# Patient Record
Sex: FEMALE | Race: WHITE | Employment: OTHER | URBAN - METROPOLITAN AREA
[De-identification: names, ages, dates, MRNs, and addresses within clinical notes are randomized per-mention and may not be internally consistent; named-entity substitution may affect disease eponyms.]

---

## 2017-03-16 PROBLEM — M67.911 DISORDER OF RIGHT ROTATOR CUFF: Status: ACTIVE | Noted: 2017-03-16

## 2017-03-16 PROBLEM — R73.9 HYPERGLYCEMIA: Status: ACTIVE | Noted: 2017-03-16

## 2017-09-05 PROBLEM — R19.7 DIARRHEA: Status: ACTIVE | Noted: 2017-09-05

## 2017-09-05 PROBLEM — R00.1 BRADYCARDIA: Status: ACTIVE | Noted: 2017-09-05

## 2017-09-08 LAB
LEFT VENTRICULAR EJECTION FRACTION MODE: NORMAL
LV EF: 64 %

## 2017-10-10 ENCOUNTER — APPOINTMENT (OUTPATIENT)
Dept: GENERAL RADIOLOGY | Age: 82
End: 2017-10-10
Attending: INTERNAL MEDICINE
Payer: MEDICARE

## 2017-10-10 ENCOUNTER — HOSPITAL ENCOUNTER (OUTPATIENT)
Dept: CARDIAC CATH/INVASIVE PROCEDURES | Age: 82
Setting detail: OBSERVATION
Discharge: HOME | End: 2017-10-11
Attending: INTERNAL MEDICINE | Admitting: INTERNAL MEDICINE
Payer: MEDICARE

## 2017-10-10 PROBLEM — Z95.0 PACEMAKER: Status: ACTIVE | Noted: 2017-10-10

## 2017-10-10 LAB
ANION GAP SERPL CALC-SCNC: 10 MMOL/L (ref 7–16)
ANION GAP SERPL CALC-SCNC: 10 MMOL/L (ref 7–16)
ATRIAL RATE: 48 BPM
ATRIAL RATE: 60 BPM
BUN SERPL-MCNC: 25 MG/DL (ref 8–23)
BUN SERPL-MCNC: 25 MG/DL (ref 8–23)
CALCIUM SERPL-MCNC: 8.9 MG/DL (ref 8.3–10.4)
CALCIUM SERPL-MCNC: 9.7 MG/DL (ref 8.3–10.4)
CALCULATED P AXIS, ECG09: -9 DEGREES
CALCULATED P AXIS, ECG09: 49 DEGREES
CALCULATED R AXIS, ECG10: -34 DEGREES
CALCULATED R AXIS, ECG10: -50 DEGREES
CALCULATED T AXIS, ECG11: 27 DEGREES
CALCULATED T AXIS, ECG11: 57 DEGREES
CHLORIDE SERPL-SCNC: 106 MMOL/L (ref 98–107)
CHLORIDE SERPL-SCNC: 107 MMOL/L (ref 98–107)
CO2 SERPL-SCNC: 23 MMOL/L (ref 21–32)
CO2 SERPL-SCNC: 23 MMOL/L (ref 21–32)
CREAT SERPL-MCNC: 0.85 MG/DL (ref 0.6–1)
CREAT SERPL-MCNC: 0.99 MG/DL (ref 0.6–1)
DIAGNOSIS, 93000: NORMAL
DIAGNOSIS, 93000: NORMAL
ERYTHROCYTE [DISTWIDTH] IN BLOOD BY AUTOMATED COUNT: 13.4 % (ref 11.9–14.6)
ERYTHROCYTE [DISTWIDTH] IN BLOOD BY AUTOMATED COUNT: 13.4 % (ref 11.9–14.6)
GLUCOSE SERPL-MCNC: 97 MG/DL (ref 65–100)
GLUCOSE SERPL-MCNC: 98 MG/DL (ref 65–100)
HCT VFR BLD AUTO: 40.9 % (ref 35.8–46.3)
HCT VFR BLD AUTO: 44 % (ref 35.8–46.3)
HGB BLD-MCNC: 13.5 G/DL (ref 11.7–15.4)
HGB BLD-MCNC: 14.3 G/DL (ref 11.7–15.4)
INR PPP: 1 (ref 0.9–1.2)
MAGNESIUM SERPL-MCNC: 1.8 MG/DL (ref 1.8–2.4)
MCH RBC QN AUTO: 30.1 PG (ref 26.1–32.9)
MCH RBC QN AUTO: 30.5 PG (ref 26.1–32.9)
MCHC RBC AUTO-ENTMCNC: 32.5 G/DL (ref 31.4–35)
MCHC RBC AUTO-ENTMCNC: 33 G/DL (ref 31.4–35)
MCV RBC AUTO: 92.3 FL (ref 79.6–97.8)
MCV RBC AUTO: 92.6 FL (ref 79.6–97.8)
P-R INTERVAL, ECG05: 168 MS
P-R INTERVAL, ECG05: 186 MS
PLATELET # BLD AUTO: 165 K/UL (ref 150–450)
PLATELET # BLD AUTO: 65 K/UL (ref 150–450)
PMV BLD AUTO: 11.1 FL (ref 10.8–14.1)
PMV BLD AUTO: 12 FL (ref 10.8–14.1)
POTASSIUM SERPL-SCNC: 4 MMOL/L (ref 3.5–5.1)
POTASSIUM SERPL-SCNC: 4.9 MMOL/L (ref 3.5–5.1)
PROTHROMBIN TIME: 11.4 SEC (ref 9.6–12)
Q-T INTERVAL, ECG07: 428 MS
Q-T INTERVAL, ECG07: 442 MS
QRS DURATION, ECG06: 80 MS
QRS DURATION, ECG06: 84 MS
QTC CALCULATION (BEZET), ECG08: 394 MS
QTC CALCULATION (BEZET), ECG08: 428 MS
RBC # BLD AUTO: 4.43 M/UL (ref 4.05–5.25)
RBC # BLD AUTO: 4.75 M/UL (ref 4.05–5.25)
SODIUM SERPL-SCNC: 139 MMOL/L (ref 136–145)
SODIUM SERPL-SCNC: 140 MMOL/L (ref 136–145)
VENTRICULAR RATE, ECG03: 48 BPM
VENTRICULAR RATE, ECG03: 60 BPM
WBC # BLD AUTO: 8 K/UL (ref 4.3–11.1)
WBC # BLD AUTO: 9.9 K/UL (ref 4.3–11.1)

## 2017-10-10 PROCEDURE — 74011250636 HC RX REV CODE- 250/636

## 2017-10-10 PROCEDURE — C1892 INTRO/SHEATH,FIXED,PEEL-AWAY: HCPCS

## 2017-10-10 PROCEDURE — 99152 MOD SED SAME PHYS/QHP 5/>YRS: CPT

## 2017-10-10 PROCEDURE — 33208 INSRT HEART PM ATRIAL & VENT: CPT

## 2017-10-10 PROCEDURE — C1785 PMKR, DUAL, RATE-RESP: HCPCS

## 2017-10-10 PROCEDURE — 80048 BASIC METABOLIC PNL TOTAL CA: CPT | Performed by: INTERNAL MEDICINE

## 2017-10-10 PROCEDURE — 77030010507 HC ADH SKN DERMBND J&J -B

## 2017-10-10 PROCEDURE — 74011250636 HC RX REV CODE- 250/636: Performed by: INTERNAL MEDICINE

## 2017-10-10 PROCEDURE — 74011000250 HC RX REV CODE- 250: Performed by: INTERNAL MEDICINE

## 2017-10-10 PROCEDURE — 93005 ELECTROCARDIOGRAM TRACING: CPT | Performed by: INTERNAL MEDICINE

## 2017-10-10 PROCEDURE — 99153 MOD SED SAME PHYS/QHP EA: CPT

## 2017-10-10 PROCEDURE — 71010 XR CHEST PORT: CPT

## 2017-10-10 PROCEDURE — 74011250637 HC RX REV CODE- 250/637: Performed by: INTERNAL MEDICINE

## 2017-10-10 PROCEDURE — 99218 HC RM OBSERVATION: CPT

## 2017-10-10 PROCEDURE — 77030012935 HC DRSG AQUACEL BMS -B

## 2017-10-10 PROCEDURE — 85610 PROTHROMBIN TIME: CPT | Performed by: INTERNAL MEDICINE

## 2017-10-10 PROCEDURE — 36005 INJECTION EXT VENOGRAPHY: CPT

## 2017-10-10 PROCEDURE — 77030031139 HC SUT VCRL2 J&J -A

## 2017-10-10 PROCEDURE — 36415 COLL VENOUS BLD VENIPUNCTURE: CPT | Performed by: INTERNAL MEDICINE

## 2017-10-10 PROCEDURE — 74011636320 HC RX REV CODE- 636/320: Performed by: INTERNAL MEDICINE

## 2017-10-10 PROCEDURE — 85027 COMPLETE CBC AUTOMATED: CPT | Performed by: INTERNAL MEDICINE

## 2017-10-10 PROCEDURE — 83735 ASSAY OF MAGNESIUM: CPT | Performed by: INTERNAL MEDICINE

## 2017-10-10 PROCEDURE — C1898 LEAD, PMKR, OTHER THAN TRANS: HCPCS

## 2017-10-10 PROCEDURE — 77030018579 HC SUT TICRN1 COVD -B

## 2017-10-10 RX ORDER — CEFAZOLIN SODIUM IN 0.9 % NACL 2 G/50 ML
2 INTRAVENOUS SOLUTION, PIGGYBACK (ML) INTRAVENOUS EVERY 8 HOURS
Status: COMPLETED | OUTPATIENT
Start: 2017-10-10 | End: 2017-10-11

## 2017-10-10 RX ORDER — SODIUM CHLORIDE 0.9 % (FLUSH) 0.9 %
5-10 SYRINGE (ML) INJECTION EVERY 8 HOURS
Status: DISCONTINUED | OUTPATIENT
Start: 2017-10-10 | End: 2017-10-11 | Stop reason: HOSPADM

## 2017-10-10 RX ORDER — LOSARTAN POTASSIUM 50 MG/1
100 TABLET ORAL DAILY
Status: DISCONTINUED | OUTPATIENT
Start: 2017-10-11 | End: 2017-10-10

## 2017-10-10 RX ORDER — SODIUM CHLORIDE 0.9 % (FLUSH) 0.9 %
5-10 SYRINGE (ML) INJECTION AS NEEDED
Status: DISCONTINUED | OUTPATIENT
Start: 2017-10-10 | End: 2017-10-11 | Stop reason: HOSPADM

## 2017-10-10 RX ORDER — MONTELUKAST SODIUM 4 MG/1
1 TABLET, CHEWABLE ORAL 2 TIMES DAILY
Status: DISCONTINUED | OUTPATIENT
Start: 2017-10-10 | End: 2017-10-11 | Stop reason: HOSPADM

## 2017-10-10 RX ORDER — ATORVASTATIN CALCIUM 10 MG/1
10 TABLET, FILM COATED ORAL
Status: DISCONTINUED | OUTPATIENT
Start: 2017-10-10 | End: 2017-10-11 | Stop reason: HOSPADM

## 2017-10-10 RX ORDER — MIDAZOLAM HYDROCHLORIDE 1 MG/ML
.5-2 INJECTION, SOLUTION INTRAMUSCULAR; INTRAVENOUS
Status: DISCONTINUED | OUTPATIENT
Start: 2017-10-10 | End: 2017-10-10

## 2017-10-10 RX ORDER — FENTANYL CITRATE 50 UG/ML
25-100 INJECTION, SOLUTION INTRAMUSCULAR; INTRAVENOUS
Status: DISCONTINUED | OUTPATIENT
Start: 2017-10-10 | End: 2017-10-10

## 2017-10-10 RX ORDER — TRAMADOL HYDROCHLORIDE 50 MG/1
50 TABLET ORAL
Status: DISCONTINUED | OUTPATIENT
Start: 2017-10-10 | End: 2017-10-11 | Stop reason: HOSPADM

## 2017-10-10 RX ORDER — CEFAZOLIN SODIUM IN 0.9 % NACL 2 G/50 ML
2 INTRAVENOUS SOLUTION, PIGGYBACK (ML) INTRAVENOUS ONCE
Status: COMPLETED | OUTPATIENT
Start: 2017-10-10 | End: 2017-10-10

## 2017-10-10 RX ORDER — SODIUM CHLORIDE 9 MG/ML
75 INJECTION, SOLUTION INTRAVENOUS CONTINUOUS
Status: DISCONTINUED | OUTPATIENT
Start: 2017-10-10 | End: 2017-10-11 | Stop reason: HOSPADM

## 2017-10-10 RX ORDER — LIDOCAINE HYDROCHLORIDE 10 MG/ML
100-400 INJECTION INFILTRATION; PERINEURAL
Status: DISCONTINUED | OUTPATIENT
Start: 2017-10-10 | End: 2017-10-10

## 2017-10-10 RX ORDER — LOSARTAN POTASSIUM 100 MG/1
100 TABLET ORAL DAILY
Status: DISCONTINUED | OUTPATIENT
Start: 2017-10-11 | End: 2017-10-11 | Stop reason: HOSPADM

## 2017-10-10 RX ADMIN — FENTANYL CITRATE 25 MCG: 50 INJECTION, SOLUTION INTRAMUSCULAR; INTRAVENOUS at 10:09

## 2017-10-10 RX ADMIN — FENTANYL CITRATE 25 MCG: 50 INJECTION, SOLUTION INTRAMUSCULAR; INTRAVENOUS at 10:37

## 2017-10-10 RX ADMIN — IOPAMIDOL 15 ML: 755 INJECTION, SOLUTION INTRAVENOUS at 10:01

## 2017-10-10 RX ADMIN — FENTANYL CITRATE 25 MCG: 50 INJECTION, SOLUTION INTRAMUSCULAR; INTRAVENOUS at 10:03

## 2017-10-10 RX ADMIN — MIDAZOLAM HYDROCHLORIDE 1 MG: 1 INJECTION, SOLUTION INTRAMUSCULAR; INTRAVENOUS at 10:36

## 2017-10-10 RX ADMIN — TRAMADOL HYDROCHLORIDE 50 MG: 50 TABLET, FILM COATED ORAL at 17:21

## 2017-10-10 RX ADMIN — MIDAZOLAM HYDROCHLORIDE 1 MG: 1 INJECTION, SOLUTION INTRAMUSCULAR; INTRAVENOUS at 10:03

## 2017-10-10 RX ADMIN — Medication 10 ML: at 17:22

## 2017-10-10 RX ADMIN — CEFAZOLIN 2 G: 1 INJECTION, POWDER, FOR SOLUTION INTRAMUSCULAR; INTRAVENOUS; PARENTERAL at 09:41

## 2017-10-10 RX ADMIN — SODIUM CHLORIDE 50000 UNITS: 900 INJECTION, SOLUTION INTRAVENOUS at 10:27

## 2017-10-10 RX ADMIN — ATORVASTATIN CALCIUM 10 MG: 10 TABLET, FILM COATED ORAL at 20:18

## 2017-10-10 RX ADMIN — LIDOCAINE HYDROCHLORIDE 8 ML: 10 INJECTION, SOLUTION INFILTRATION; PERINEURAL at 10:14

## 2017-10-10 RX ADMIN — SODIUM CHLORIDE 75 ML/HR: 900 INJECTION, SOLUTION INTRAVENOUS at 09:41

## 2017-10-10 RX ADMIN — TRAMADOL HYDROCHLORIDE 50 MG: 50 TABLET, FILM COATED ORAL at 23:31

## 2017-10-10 RX ADMIN — Medication 10 ML: at 20:17

## 2017-10-10 RX ADMIN — CEFAZOLIN 2 G: 1 INJECTION, POWDER, FOR SOLUTION INTRAMUSCULAR; INTRAVENOUS; PARENTERAL at 22:09

## 2017-10-10 RX ADMIN — LIDOCAINE HYDROCHLORIDE 20 ML: 10 INJECTION, SOLUTION INFILTRATION; PERINEURAL at 10:07

## 2017-10-10 RX ADMIN — MIDAZOLAM HYDROCHLORIDE 1 MG: 1 INJECTION, SOLUTION INTRAMUSCULAR; INTRAVENOUS at 10:09

## 2017-10-10 RX ADMIN — CEFAZOLIN 2 G: 1 INJECTION, POWDER, FOR SOLUTION INTRAMUSCULAR; INTRAVENOUS; PARENTERAL at 17:25

## 2017-10-10 NOTE — PROGRESS NOTES
TRANSFER - IN REPORT:    Verbal report received from Vern1 Shukri Wyndmerebob Kapadia on Dissolve  being received from [facility] for routine progression of care      Report consisted of patients Situation, Background, Assessment and   Recommendations(SBAR). Information from the following report(s) SBAR, Kardex, Procedure Summary, Intake/Output, MAR and Recent Results was reviewed with the receiving nurse. Opportunity for questions and clarification was provided. Assessment completed upon patients arrival to unit and care assumed. Dual skin assessment completed with RN. No redness or skin breakdown on sacrum/coccyx area noted.

## 2017-10-10 NOTE — PROGRESS NOTES
Medicare Outpatient Observation Notice provided to the patient. Oral explanation was provided and all questions answered. Signed document placed in the medical record under media tab. Copy to patient. Pt alert and oriented s/p pacermaker insertion by Dr. Shelli Alfaro. States she lives with  and 47 y/o son with brain tumor with seizures. Independent prior to admission. Drives self and does not use anything to ambulate with. Dr. Laura Valdez is PCP and able to get rx financially per pt. Confirms MCR/supp. No needs voiced for d/c at this time.

## 2017-10-10 NOTE — IP AVS SNAPSHOT
[address] 
[phone] Patient: Yajaira Vasquez MRN: [id number] You are allergic to the following Allergen Reactions Lortab (Hydrocodone-Acetaminophen) Nausea and Vomiting Rash Other (comments) Lisinopril Cough Antihypertensives Other Medication Nausea Only Narcotics Immunizations Administered for This Admission Name Date Influenza Vaccine (Quad) PF 10/11/2017 Recent Documentation Height Weight Breastfeeding? BMI OB Status Smoking Status 1.549 m 66.4 kg No 27.66 kg/m2 Hysterectomy Never Smoker Emergency Contacts Name Discharge Info Relation Home Work Mobile Emmanuel Stone DISCHARGE CAREGIVER [3] Spouse [3] [phone] [address] [place] [address] CAREGIVER [3] Son [22] [phone] About your hospitalization You were admitted on:  October 10, 2017 You last received care in the:  [facility] 2 CV STEPDOWN You were discharged on:  2017 Unit phone number:  [phone] Why you were hospitalized Your primary diagnosis was:  Not on File Your diagnoses also included:  Pacemaker Providers Seen During Your Hospitalizations Provider Role Specialty Primary office phone Tristen Cox MD Attending Provider Cardiology [phone] Your Primary Care Physician (PCP) Primary Care Physician Office Phone Office Fax Nayely Be [phone] Follow-up Information Follow up With Details Comments Contact Info Carter Cbua MD  Oct 23 at 8:00[facility] office with device check  [address] [facility] Cardiology [facility] [address] 
[phone] MD Christi [address] [facility] [address] 
[phone] Your Appointments  2017  8:00 AM EDT  
 OFFICE DEVICE CHECKS with G[facility] [address] [facility] CARDIOLOGY [place] OFFICE ([facility]) [address] [place]  
[address] Patti Jha [address]  
[phone] This upcoming device check will take place IN OUR OFFICE. Please arrive 15 minutes early to review any necessary paperwork requirements. If you have any further questions or need to change this appointment, we are happy to help and can be reached at [phone]. Current Discharge Medication List  
  
START taking these medications Dose & Instructions Dispensing Information Comments Morning Noon Evening Bedtime  
 traMADol 50 mg tablet Commonly known as:  ULTRAM  
   
Your last dose was: Your next dose is:    
   
   
 Dose:  50 mg Take 1 Tab by mouth every six (6) hours as needed. Max Daily Amount: 200 mg. Quantity:  10 Tab Refills:  0 CONTINUE these medications which have NOT CHANGED Dose & Instructions Dispensing Information Comments Morning Noon Evening Bedtime  
 alendronate 70 mg tablet Commonly known as:  FOSAMAX Your last dose was: Your next dose is:    
   
   
 Dose:  70 mg Take 1 Tab by mouth every seven (7) days. With 8 oz water, sitting upright , 30 min before other food or drink Quantity:  12 Tab Refills:  4  
     
   
   
   
  
 atorvastatin 10 mg tablet Commonly known as:  LIPITOR Your last dose was: Your next dose is:    
   
   
 Dose:  10 mg Take 1 Tab by mouth daily. Quantity:  30 Tab Refills:  12 CALCIUM 600 + D 600-125 mg-unit Tab Generic drug:  calcium-cholecalciferol (d3) Your last dose was: Your next dose is:    
   
   
 Dose:  1 Tab Take 1 Tab by mouth daily. Refills:  0  
     
   
   
   
  
 COLESTID 1 gram tablet Generic drug:  colestipol Your last dose was:     
   
Your next dose is:    
   
   
 Dose:  1 g  
 Take 1 g by mouth two (2) times a day. Refills:  0  
     
   
   
   
  
 losartan 100 mg tablet Commonly known as:  COZAAR Your last dose was: Your next dose is: TAKE ONE TABLET BY MOUTH EVERY DAY FOR HYPERTENSION Quantity:  90 Tab Refills:  4  
     
   
   
   
  
 multivitamin with iron tablet Your last dose was: Your next dose is:    
   
   
 Dose:  1 Tab Take 1 Tab by mouth daily. Refills:  0  
     
   
   
   
  
 VITAMIN D3 1,000 unit Cap Generic drug:  cholecalciferol Your last dose was: Your next dose is:    
   
   
 Dose:  1000 Units Take 1,000 Units by mouth daily. Refills:  0 STOP taking these medications TYLENOL 325 mg tablet Generic drug:  acetaminophen Where to Get Your Medications Information on where to get these meds will be given to you by the nurse or doctor. ! Ask your nurse or doctor about these medications  
  traMADol 50 mg tablet Discharge Instructions Eat a low saturated fat, low cholesterol and low salt diet. Do not to lift anything over 5-10 lbs with left arm. No pushing or pulling or lifting arm above shoulder. Watch pacemaker incision site for bleeding/oozing, if seen apply firm pressure with clean cloth and call office at [phone]. Watch for signs of infection which include increasing area of redness around site, fever/hot to touch or purulent drainage. Call office or return to ER for immediate evaluation of any shortness of breath, chest pain or palpitations. Pacemaker Placement: What to Expect at [facility] Your Recovery Pacemaker placement is surgery to put a pacemaker in your chest when you have bradycardia (a slow heart rate). A pacemaker is a small, battery-powered device that sends electrical signals to the heart to keep the heartbeat steady.  Thin wires, called leads, carry the signals from the pacemaker to the heart. A pacemaker can prevent or reduce dizziness, fainting, and shortness of breath caused by a slow or unsteady heartbeat. Your chest may be sore where the doctor made the cut (incision) and put in the pacemaker. You also may have a bruise and mild swelling. These symptoms usually get better in 1 to 2 weeks. You may feel a hard ridge along the incision. This usually gets softer in the months after surgery. You may be able to see or feel the outline of the pacemaker under your skin. You will probably be able to go back to work or your usual routine 1 to 2 weeks after surgery. Pacemaker batteries usually last 5 to 15 years. Your doctor will talk to you about how often you will need to have your pacemaker checked. You can safely use most household and office electronics such as kitchen appliances, electric power tools, and computers. You will need to stay away from things with strong magnetic and electrical fields such as an MRI machine (unless your pacemaker is safe for an MRI), welding equipment, and power generators. You can use a cell phone, but keep it at least 6 inches away from your pacemaker. Check with your doctor about what you need to stay away from, what you need to use with care, and what is okay to use. This care sheet gives you a general idea about how long it will take for you to recover. But each person recovers at a different pace. Follow the steps below to get better as quickly as possible. How can you care for yourself at home? Activity · Rest when you feel tired. · Be active. Walking is a good choice. · For 4 to 6 weeks: ¨ Avoid activities that strain your chest or upper arm muscles. This includes pushing a  or vacuum, mopping floors, swimming, or swinging a golf club or tennis racquet. ¨ Do not raise your arm (the one on the side of your body where the pacemaker is located) above your shoulder. ¨ Allow your body to heal. Don't move quickly or lift anything heavy until you are feeling better. · Many people are able to return to work within 1 to 2 weeks after surgery. · Ask your doctor when it is okay for you to have sex. Diet · You can eat your normal diet. If your stomach is upset, try bland, low-fat foods like plain rice, broiled chicken, toast, and yogurt. Medicines · Your doctor will tell you if and when you can restart your medicines. He or she will also give you instructions about taking any new medicines. · If you take aspirin or some other blood thinner, be sure to talk to your doctor. He or she will tell you if and when to start taking this medicine again. Make sure that you understand exactly what your doctor wants you to do. · Be safe with medicines. Read and follow all instructions on the label. ¨ If the doctor gave you a prescription medicine for pain, take it as prescribed. ¨ If you are not taking a prescription pain medicine, ask your doctor if you can take an over-the-counter medicine. ¨ Do not take aspirin, ibuprofen (Advil, Motrin), naproxen (Aleve), or other nonsteroidal anti-inflammatory drugs (NSAIDs) unless your doctor says it is okay. · If your doctor prescribed antibiotics, take them as directed. Do not stop taking them just because you feel better. You need to take the full course of antibiotics. Incision care · If you have strips of tape on the incision, leave the tape on for a week or until it falls off. · Keep the incision dry while it heals. Your doctor may recommend sponge baths for about 7 days, but do not get the incision wet. Your doctor will let you know when you may take showers. After a shower, pat the incision dry. · Don't use hydrogen peroxide or alcohol on the incision, which can slow healing. You may cover the area with a gauze bandage if it oozes fluid or rubs against clothing. Change the bandage every day. · Do not take a bath or get into a hot tub for the first 2 weeks, or until your doctor tells you it is okay. Other instructions · Keep a medical ID card with you at all times that says you have a pacemaker. The card should include the  and model information. · Wear medical alert jewelry stating that you have a pacemaker. You can buy this at most drugstores. · Check your pulse as directed by your doctor. · Have your pacemaker checked as often as your doctor recommends. In some cases, this may be done over the phone or the Internet. Your doctor will give you instructions about how to do this. Follow-up care is a key part of your treatment and safety. Be sure to make and go to all appointments, and call your doctor if you are having problems. It's also a good idea to know your test results and keep a list of the medicines you take. When should you call for help? Call 911 anytime you think you may need emergency care. For example, call if: 
· You passed out (lost consciousness). · You have severe trouble breathing. · You have sudden chest pain and shortness of breath, or you cough up blood. · You have symptoms of a heart attack. These may include: ¨ Chest pain or pressure, or a strange feeling in the chest. 
¨ Sweating. ¨ Shortness of breath. ¨ Nausea or vomiting. ¨ Pain, pressure, or a strange feeling in the back, neck, jaw, or upper belly or in one or both shoulders or arms. ¨ Lightheadedness or sudden weakness. ¨ A fast or irregular heartbeat. After you call 911, the  may tell you to chew 1 adult-strength or 2 to 4 low-dose aspirin. Wait for an ambulance. Do not try to drive yourself. · You have symptoms of a stroke. These may include: 
¨ Sudden numbness, tingling, weakness, or loss of movement in your face, arm, or leg, especially on only one side of your body. ¨ Sudden vision changes. ¨ Sudden trouble speaking. ¨ Sudden confusion or trouble understanding simple statements. ¨ Sudden problems with walking or balance. ¨ A sudden, severe headache that is different from past headaches. Call your doctor now or seek immediate medical care if: 
· Your heartbeat feels very fast or slow, skips beats, or flutters. · You are dizzy or lightheaded, or you feel like you may faint. · You have new or increased shortness of breath. · You have pain that does not get better after you take pain medicine. · You hear an alarm or feel a vibration from your pacemaker. · You have loose stitches, or your incision comes open. · Bright red blood has soaked through the bandage over your incision. · You have signs of infection, such as: 
¨ Increased pain, swelling, warmth, or redness. ¨ Red streaks leading from the incision. ¨ Pus draining from the incision. ¨ A fever. · You have signs of a blood clot, such as: 
¨ Pain in your arm on the same side that the pacemaker is placed, or in your calf, back of the knee, thigh, or groin. ¨ Redness and swelling in your arm on the same side that the pacemaker is placed, or in your leg or groin. Watch closely for changes in your health, and be sure to contact your doctor if: 
· You have any problems with your pacemaker. Where can you learn more? Go to http://vira-jeffry.info/. Enter G550 in the search box to learn more about \"Pacemaker Placement: What to Expect at Home. \" Current as of: November 11, 2016 Content Version: 11.3 © 8993-6760 BioMetric Solution. Care instructions adapted under license by [employer] (which disclaims liability or warranty for this information). If you have questions about a medical condition or this instruction, always ask your healthcare professional. [place] [address] any warranty or liability for your use of this information. Discharge Instructions Attachments/References HEALTHY DIET: HEART (ENGLISH) SECONDHAND SMOKE (ENGLISH) Discharge Orders None ACO Transitions of Care Introducing [employer] [address] Jillian Lin offers a voluntary care coordination program to provide high quality service and care to [facility] fee-for-service beneficiaries. Beckie Mccray was designed to help you enhance your health and well-being through the following services: ? Transitions of Care  support for individuals who are transitioning from one care setting to another (example: Hospital to home). ? Chronic and Complex Care Coordination  support for individuals and caregivers of those with serious or chronic illnesses or with more than one chronic (ongoing) condition and those who take a number of different medications. If you meet specific medical criteria, a [address] [place] [address] Rd may call you directly to coordinate your care with your primary care physician and your other care providers. For questions about the [facility] programs, please, contact your physicians office. For general questions or additional information about Accountable Care Organizations: 
Please visit www.medicare.gov/acos. html or call 1-800-MEDICARE (7-505.870.8324) TTY users should call 2-779.754.4755. WikiYou Announcement We are excited to announce that we are making your provider's discharge notes available to you in WikiYou. You will see these notes when they are completed and signed by the physician that discharged you from your recent hospital stay. If you have any questions or concerns about any information you see in WikiYou, please call the Health Information Department where you were seen or reach out to your Primary Care Provider for more information about your plan of care. Introducing [facility] [place] [facility]! Asael Steele introduces WikiYou patient portal. Now you can access parts of your medical record, email your doctor's office, and request medication refills online. 1. In your internet browser, go to [email or web address] 2. Click on the First Time User? Click Here link in the Sign In box. You will see the New Member Sign Up page. 3. Enter your Appetas Access Code exactly as it appears below. You will not need to use this code after youve completed the sign-up process. If you do not sign up before the expiration date, you must request a new code. · Appetas Access Code: [email or web address] Expires: 12/4/2017  9:06 AM 
 
4. Enter the last four digits of your Social Security Number (xxxx) and Date of Birth (mm/dd/yyyy) as indicated and click Submit. You will be taken to the next sign-up page. 5. Create a Appetas ID. This will be your Appetas login ID and cannot be changed, so think of one that is secure and easy to remember. 6. Create a Appetas password. You can change your password at any time. 7. Enter your Password Reset Question and Answer. This can be used at a later time if you forget your password. 8. Enter your e-mail address. You will receive e-mail notification when new information is available in [address] 9. Click Sign Up. You can now view and download portions of your medical record. 10. Click the Download Summary menu link to download a portable copy of your medical information. If you have questions, please visit the Frequently Asked Questions section of the Appetas website. Remember, Appetas is NOT to be used for urgent needs. For medical emergencies, dial 911. Now available from your iPhone and Android! General Information Please provide this summary of care documentation to your next provider. Patient Signature:  ____________________________________________________________ Date:  ____________________________________________________________  
  
Joelene Batman Provider Signature:  ____________________________________________________________ Date:  ____________________________________________________________ More Information Heart-Healthy Diet: Care Instructions Your Care Instructions A heart-healthy diet has lots of vegetables, fruits, nuts, beans, and whole grains, and is low in salt. It limits foods that are high in saturated fat, such as meats, cheeses, and fried foods. It may be hard to change your diet, but even small changes can lower your risk of heart attack and heart disease. Follow-up care is a key part of your treatment and safety. Be sure to make and go to all appointments, and call your doctor if you are having problems. It's also a good idea to know your test results and keep a list of the medicines you take. How can you care for yourself at home? Watch your portions · Learn what a serving is. A \"serving\" and a \"portion\" are not always the same thing. Make sure that you are not eating larger portions than are recommended. For example, a serving of pasta is ½ cup. A serving size of meat is 2 to 3 ounces. A 3-ounce serving is about the size of a deck of cards. Measure serving sizes until you are good at [place]" them. Keep in mind that restaurants often serve portions that are 2 or 3 times the size of one serving. · To keep your energy level up and keep you from feeling hungry, eat often but in smaller portions. · Eat only the number of calories you need to stay at a healthy weight. If you need to lose weight, eat fewer calories than your body burns (through exercise and other physical activity). Eat more fruits and vegetables · Eat a variety of fruit and vegetables every day. Dark green, deep orange, red, or yellow fruits and vegetables are especially good for you. Examples include spinach, carrots, peaches, and berries. · Keep carrots, celery, and other veggies handy for snacks. Buy fruit that is in season and store it where you can see it so that you will be tempted to eat it. · Cook dishes that have a lot of veggies in them, such as stir-fries and soups. Limit saturated and trans fat · Read food labels, and try to avoid saturated and trans fats. They increase your risk of heart disease. Trans fat is found in many processed foods such as cookies and crackers. · Use olive or canola oil when you cook. Try cholesterol-lowering spreads, such as Benecol or Take Control. · Bake, broil, grill, or steam foods instead of frying them. · Choose lean meats instead of high-fat meats such as hot dogs and sausages. Cut off all visible fat when you prepare meat. · Eat fish, skinless poultry, and meat alternatives such as soy products instead of high-fat meats. Soy products, such as tofu, may be especially good for your heart. · Choose low-fat or fat-free milk and dairy products. Eat fish · Eat at least two servings of fish a week. Certain fish, such as salmon and tuna, contain omega-3 fatty acids, which may help reduce your risk of heart attack. Eat foods high in fiber · Eat a variety of grain products every day. Include whole-grain foods that have lots of fiber and nutrients. Examples of whole-grain foods include oats, whole wheat bread, and brown rice. · Buy whole-grain breads and cereals, instead of white bread or pastries. Limit salt and sodium · Limit how much salt and sodium you eat to help lower your blood pressure. · Taste food before you salt it. Add only a little salt when you think you need it. With time, your taste buds will adjust to less salt. · Eat fewer snack items, fast foods, and other high-salt, processed foods. Check food labels for the amount of sodium in packaged foods. · Choose low-sodium versions of canned goods (such as soups, vegetables, and beans). Limit sugar · Limit drinks and foods with added sugar. These include candy, desserts, and soda pop. Limit alcohol · Limit alcohol to no more than 2 drinks a day for men and 1 drink a day for women. Too much alcohol can cause health problems. When should you call for help? Watch closely for changes in your health, and be sure to contact your doctor if: 
· You would like help planning heart-healthy meals. Where can you learn more? Go to http://vira-jeffry.info/. Enter V137 in the search box to learn more about \"Heart-Healthy Diet: Care Instructions. \" Current as of: April 3, 2017 Content Version: 11.3 © 5778-2905 Qwalytics. Care instructions adapted under license by 24tidy (which disclaims liability or warranty for this information). If you have questions about a medical condition or this instruction, always ask your healthcare professional. [address] any warranty or liability for your use of this information. Secondhand Smoke: Care Instructions Your Care Instructions Secondhand smoke comes from the burning end of a cigarette, cigar, or pipe and the smoke that a smoker exhales. The smoke contains nicotine and many other harmful chemicals. Breathing secondhand smoke can cause or worsen health problems including cancer, asthma, coronary artery disease, and respiratory infections. It can make your eyes and nose burn and cause a sore throat. Secondhand smoke is especially bad for babies and young children whose lungs are still developing. Babies whose parents smoke are more likely to have ear infections, pneumonia, and bronchitis in the first few years of their lives. Secondhand smoke can make asthma symptoms worse in children. If you are pregnant, it is important that you not smoke and that you avoid secondhand smoke. You are more likely to give birth to a baby who weighs less than expected (low birth weight) if you smoke. And your baby may have a greater risk for sudden infant death syndrome (SIDS).  Babies whose mothers are exposed to secondhand smoke during pregnancy have a higher risk for health problems. Follow-up care is a key part of your treatment and safety. Be sure to make and go to all appointments, and call your doctor if you are having problems. It's also a good idea to know your test results and keep a list of the medicines you take. How can you care for yourself at home? · Do not smoke or let anyone else smoke in your home. If people must smoke, ask them to go outside. · If people do smoke in your home, choose a room where you can open a window or use a fan to get the smoke outside. · Do not let anyone smoke in your car. If someone must smoke, pull over in a safe place and let him or her smoke away from the car. · Ask your employer to make sure that you have a smoke-free work area. · Make sure that your children are not exposed to secondhand smoke at day care, school, and after-school programs. · Try to choose nonsmoking bars, restaurants, and other public places when you go out. · Help your family and friends who smoke to quit by encouraging them to try. Tell them about treatment resources. Having support from others often helps. · If you smoke, quit. Quitting is hard, but there are ways to boost your chance of quitting tobacco for good. ¨ Use nicotine gum, patches, or lozenges. Call a quitline. Ask your doctor about stop-smoking programs and medicines. ¨ Keep trying. When should you call for help? Watch closely for changes in your health, and be sure to contact your doctor if you have any problems. Where can you learn more? Go to http://vira-jeffry.info/. Enter L004 in the search box to learn more about \"Secondhand Smoke: Care Instructions. \" Current as of: March 20, 2017 Content Version: 11.3 © 0693-2323 Zapoint. Care instructions adapted under license by [employer] Logs (which disclaims liability or warranty for this information).  If you have questions about a medical condition or this instruction, always ask your healthcare professional. [place] [address] any warranty or liability for your use of this information.

## 2017-10-10 NOTE — PROGRESS NOTES
TRANSFER - OUT REPORT:    Verbal report given to Nicholas Crowley RN (name) on [email or web address]  being transferred to [address] [place] [place] [address] ([facility]) for routine progression of care       Report consisted of patients Situation, Background, Assessment and   Recommendations(SBAR). Information from the following report(s) SBAR was reviewed with the receiving nurse. Lines:   Peripheral IV 12/04/09 Right Hand (Active)       Peripheral IV 10/10/17 Left Arm (Active)        Opportunity for questions and clarification was provided. Patient transported with:   Tech         Pt had dual chamber PPM placed to L chest. Settings are DDDR . Site closed with suture, Dermabond, and AquaCel Ag dressing. Sling applied to L arm. Pt received Versed 3mg IV and Fentanyl 75mcg IV.

## 2017-10-10 NOTE — PROGRESS NOTES
Report received from Gustavo Barnes. Procedural findings communicated. Intra procedural  medication administration reviewed. Progression of care discussed. Patient received into [address] [facility] 6 post procedure.      Incision site without bleeding or swelling yes    Dressing dry and intact yes    Patient instructed to limit movement to left upper  extremity    Routine post procedural vital signs and site assessment initiated yes

## 2017-10-10 NOTE — PROGRESS NOTES
Patient received to [address] [place] [address] room # 6  Ambulatory from [facility]. Patient scheduled for PPM today with Dr Indiana Stewart. Procedure reviewed & questions answered, voiced good understanding consent obtained & placed on chart. All medications and medical history reviewed. Will prep patient per orders. Patient & family updated on plan of care.

## 2017-10-10 NOTE — PROCEDURES
Pavel Bryant 44       Name:  Sadiq Fay   MR#:  [id number]   :  1934   Account #:  [de-identified]   Date of Adm:  10/10/2017       PROCEDURE: Permanent pacemaker implantation. REFERRING PHYSICIAN: Madelaine Knott MD.    PROCEDURE: After obtaining informed consent, the patient was   brought to the lab. Left pectoral region was cleaned and prepped   in sterile fashion. Lidocaine solution was used for local   anesthesia. Venogram was performed. Modified Seldinger technique   was used to cannulate the left subclavian vein x2. Combination   of sharp and blunt dissection was then utilized to make a   pacemaker pocket. Two sheaths were then placed over the wires. Leads were then placed in appropriate positions in the right   atrium and right ventricle. The right ventricle required a septal   location, the right atrium required 3 different locations before   the lead stayed stable. After finding stable lead positions, the   pocket was flushed, the leads were attached to the prepectoral   fascia. The can was attached to the appropriate leads and set   screw was tightened. The device was placed in the pocket and   closure was performed with 2-0 Vicryl, 3-0 Vicryl, Dermabond and   Aquacel. There were no complications. All counts were correct. IMPLANT DATA: The implanted pulse generator is a Biotronik Edora   8 DR-T, serial [id number], DDD CLS 60/120. Atrial lead is a Solia S45 bipolar active fixation 45 cm lead. Threshold 1.1 volts at 0.4 milliseconds. P waves 2.8 millivolts,   resistance 526 ohms. No phrenic pacing at 10 volts. Right ventricular lead is a Solia S53 bipolar active fixation 53   cm lead. Threshold 0.3 volts at 0.4 milliseconds, R-wave 17.2   millivolts, resistance 682 ohms. No diaphragmatic pacing at 10   volts. Retrograde conduction was 345 milliseconds.     CONCLUSION: Successful implant of a dual-chamber MRI safe   Biotronik pacing system in the left pectoral region for   intermittent heart block and pauses.         MD RIVERA Perry / Daphne Azevedo   D:  10/10/2017   11:00   T:  10/10/2017   11:22   Job #:  [id number]

## 2017-10-10 NOTE — PROGRESS NOTES
Called Dr. Kelly Rene due to patient requesting pain medicine. New orders received. 50mg ultram for pain q6 hrs. Patient stable.

## 2017-10-10 NOTE — PROGRESS NOTES
Bedside shift report given to Xavi Ruffin RN (oncoming nurse) by Vladimir Porter RN (offgoing nurse). Bedside shift report included the following information: SBAR, Kardex, ED Summary, MAR, and Recent Results.

## 2017-10-10 NOTE — PROGRESS NOTES
TRANSFER - OUT REPORT:    Verbal report given to [address] Karen Matias on [facility]  being transferred to [facility](unit) for routine progression of care       Report consisted of patients Situation, Background, Assessment and   Recommendations(SBAR). Information from the following report(s) Procedure Summary was reviewed with the receiving nurse. Lines:   Peripheral IV 12/04/09 Right Hand (Active)       Peripheral IV 10/10/17 Left Arm (Active)        Opportunity for questions and clarification was provided.

## 2017-10-11 VITALS
HEIGHT: 61 IN | BODY MASS INDEX: 27.64 KG/M2 | OXYGEN SATURATION: 96 % | SYSTOLIC BLOOD PRESSURE: 154 MMHG | DIASTOLIC BLOOD PRESSURE: 80 MMHG | TEMPERATURE: 97.7 F | HEART RATE: 60 BPM | WEIGHT: 146.4 LBS | RESPIRATION RATE: 18 BRPM

## 2017-10-11 PROCEDURE — 74011250637 HC RX REV CODE- 250/637: Performed by: INTERNAL MEDICINE

## 2017-10-11 PROCEDURE — 90471 IMMUNIZATION ADMIN: CPT

## 2017-10-11 PROCEDURE — 90686 IIV4 VACC NO PRSV 0.5 ML IM: CPT | Performed by: INTERNAL MEDICINE

## 2017-10-11 PROCEDURE — 74011250636 HC RX REV CODE- 250/636: Performed by: INTERNAL MEDICINE

## 2017-10-11 PROCEDURE — 99218 HC RM OBSERVATION: CPT

## 2017-10-11 RX ORDER — TRAMADOL HYDROCHLORIDE 50 MG/1
50 TABLET ORAL
Qty: 10 TAB | Refills: 0 | Status: SHIPPED | OUTPATIENT
Start: 2017-10-11 | End: 2018-02-13

## 2017-10-11 RX ADMIN — INFLUENZA VIRUS VACCINE 0.5 ML: 15; 15; 15; 15 SUSPENSION INTRAMUSCULAR at 10:38

## 2017-10-11 RX ADMIN — MONTELUKAST SODIUM 1 G: 4 TABLET, CHEWABLE ORAL at 08:15

## 2017-10-11 RX ADMIN — TRAMADOL HYDROCHLORIDE 50 MG: 50 TABLET, FILM COATED ORAL at 06:23

## 2017-10-11 RX ADMIN — Medication 10 ML: at 05:40

## 2017-10-11 RX ADMIN — LOSARTAN POTASSIUM 100 MG: 100 TABLET ORAL at 08:15

## 2017-10-11 NOTE — DISCHARGE SUMMARY
[facility] Cardiology Discharge Summary     Patient ID:  Yajaira Vasquez  [id number]  50 y.o.  1934    Admit date: 10/10/2017    Discharge date and time:  10-11-17    Admitting Physician: Tristen Cox MD     Discharge Physician: Rigo Dougherty NP/Dr. Diana    Admission Diagnoses: Bradycardia [R00.1]    Discharge Diagnoses:   Patient Active Problem List    Diagnosis Date Noted    Pacemaker 10/10/2017    Diarrhea 09/05/2017    Bradycardia 09/05/2017    Disorder of right rotator cuff 03/16/2017    Hyperglycemia  03/16/2017    Vitamin D deficiency 11/14/2016    Elevated carcinoembryonic antigen 10/04/2016    Chronic lymphocytic leukemia ([address] 03/04/2016    History of colorectal cancer 03/04/2016    Essential hypertension     HLD (hyperlipidemia)     Osteopenia     Sciatica     Iron deficiency anemia 10/19/2015    Glucose intolerance (no malabsorption) 10/19/2015    Osteoarthritis of right knee 10/07/2010    Total knee replacement status 10/07/2010       Cardiology Procedures this admission:  Pacemaker Implantation, CXR  Consults: none    Hospital Course: Patient was seen in our office by  [facility] for follow up post ecardio. He ecardio showed SB with sinus arrest of 3.1 seconds with symptoms. Pt was scheduled for an AM Admission PM implantation at [facility] on 10-10-17. Pt came in to [facility] and was taken to the cath lab by Dr. Parisa Miller. Pt received a dual-chamber MRI safe Biotronik pacemaker without complication. Follow up CXR showed no pneumothorax. Pt tolerated the procedure well and was taken to the CV stepdown floor for recovery. The following morning Pt was up feeling well without any complaints of CP, SOB or palpitations. The patient device was interrogated prior to discharge with normal function. Pt's left subclavian PM site was clean, dry and intact without hematoma. Pt's labs were WNL. Pt was seen and examined by Dr. Parisa Miller and determined stable and ready for discharge.  Pt was instructed to follow PM discharge instructions given by nursing staff. The patient will follow up with device clinic on 10/23/17 at 8 am in [facility]. DISPOSITION: The patient is being discharged home in stable condition on a low saturated fat, low cholesterol and low salt diet. Pt is instructed to advance activities as tolerated limited to fatigue or shortness of breath. Pt is instructed not to lift anything over 5-10 lbs with affected arm. No pushing or pulling or lifting arm above shoulder. See complete discharge instructions. Pt is instructed to watch PM site for bleeding/oozing, if seen Pt is instructed to apply firm pressure with clean cloth and call office at [phone]. Pt is instructed to watch for signs of infection which include increasing area of redness around site, fever/hot to touch or purulent drainage. Pt is instructed to call office or return to ER for immediate evaluation of any shortness of breath, chest pain or palpitations. Discharge Exam:   Visit Vitals    /84    Pulse 60    Temp 97.3 °F (36.3 °C)    Resp 18    Ht 5' 1\" (1.549 m)    Wt 66.4 kg (146 lb 6.4 oz)    SpO2 95%    Breastfeeding No    BMI 27.66 kg/m2       Visit Vitals    /84    Pulse 60    Temp 97.3 °F (36.3 °C)    Resp 18    Ht 5' 1\" (1.549 m)    Wt 66.4 kg (146 lb 6.4 oz)    SpO2 95%    Breastfeeding No    BMI 27.66 kg/m2     General Appearance:  Well developed, well nourished,alert and oriented x 3, and individual in no acute distress. Ears/Nose/Mouth/Throat:   Hearing grossly normal.         Neck: Supple. Chest:   Lungs clear to auscultation bilaterally. Left upper chest with dressing intact, no bleeding or hematoma. Cardiovascular:  Regular rate and rhythm, S1, S2 normal, no murmur. Abdomen:   Soft, non-tender, bowel sounds are active. Extremities: No edema bilaterally. Skin: Warm and dry.               Pt has been seen by Dr. Taiwo Tripathi: see his progress note for exam details. Recent Results (from the past 24 hour(s))   METABOLIC PANEL, BASIC    Collection Time: 10/10/17  8:24 AM   Result Value Ref Range    Sodium 139 136 - 145 mmol/L    Potassium 4.0 3.5 - 5.1 mmol/L    Chloride 106 98 - 107 mmol/L    CO2 23 21 - 32 mmol/L    Anion gap 10 7 - 16 mmol/L    Glucose 98 65 - 100 mg/dL    BUN 25 (H) 8 - 23 MG/DL    Creatinine 0.85 0.6 - 1.0 MG/DL    GFR est AA >60 >60 ml/min/1.73m2    GFR est non-AA >60 >60 ml/min/1.73m2    Calcium 8.9 8.3 - 10.4 MG/DL   CBC W/O DIFF    Collection Time: 10/10/17  8:24 AM   Result Value Ref Range    WBC 9.9 4.3 - 11.1 K/uL    RBC 4.43 4.05 - 5.25 M/uL    HGB 13.5 11.7 - 15.4 g/dL    HCT 40.9 35.8 - 46.3 %    MCV 92.3 79.6 - 97.8 FL    MCH 30.5 26.1 - 32.9 PG    MCHC 33.0 31.4 - 35.0 g/dL    RDW 13.4 11.9 - 14.6 %    PLATELET 386 630 - 804 K/uL    MPV 11.1 10.8 - 14.1 FL   EKG, 12 LEAD, INITIAL    Collection Time: 10/10/17 12:58 PM   Result Value Ref Range    Ventricular Rate 60 BPM    Atrial Rate 60 BPM    P-R Interval 168 ms    QRS Duration 84 ms    Q-T Interval 428 ms    QTC Calculation (Bezet) 428 ms    Calculated P Axis -9 degrees    Calculated R Axis -50 degrees    Calculated T Axis 27 degrees    Diagnosis       Electronic atrial pacemaker  Left axis deviation  Minimal voltage criteria for LVH, may be normal variant  Inferior infarct , age undetermined  Possible Anterior infarct , age undetermined  Abnormal ECG  When compared with ECG of 10-OCT-2017 07:28,  Electronic atrial pacemaker has replaced Sinus rhythm  Inferior infarct is now Present  Confirmed by [facility]  MD (), Mitzi Rios ([id number]) on 10/10/2017 2:46:22 PM           Patient Instructions:   Current Discharge Medication List      START taking these medications    Details   traMADol (ULTRAM) 50 mg tablet Take 1 Tab by mouth every six (6) hours as needed. Max Daily Amount: 200 mg.   Qty: 10 Tab, Refills: 0         CONTINUE these medications which have NOT CHANGED    Details   colestipol (COLESTID) 1 gram tablet Take 1 g by mouth two (2) times a day. losartan (COZAAR) 100 mg tablet TAKE ONE TABLET BY MOUTH EVERY DAY FOR HYPERTENSION  Qty: 90 Tab, Refills: 4    Associated Diagnoses: Essential hypertension with goal blood pressure less than 130/80      atorvastatin (LIPITOR) 10 mg tablet Take 1 Tab by mouth daily. Qty: 30 Tab, Refills: 12    Associated Diagnoses: Essential hypertension      calcium-cholecalciferol, d3, (CALCIUM 600 + D) 600-125 mg-unit tab Take 1 Tab by mouth daily. cholecalciferol (VITAMIN D3) 1,000 unit cap Take 1,000 Units by mouth daily. alendronate (FOSAMAX) 70 mg tablet Take 1 Tab by mouth every seven (7) days. With 8 oz water, sitting upright , 30 min before other food or drink  Qty: 12 Tab, Refills: 4    Associated Diagnoses: Osteopenia      multivitamin with iron tablet Take 1 Tab by mouth daily.          STOP taking these medications       acetaminophen (TYLENOL) 325 mg tablet Comments:   Reason for Stopping:                 Signed:  Liat Patel NP  10/11/2017  8:01 AM

## 2017-10-11 NOTE — DISCHARGE INSTRUCTIONS
Eat a low saturated fat, low cholesterol and low salt diet. Do not to lift anything over 5-10 lbs with left arm. No pushing or pulling or lifting arm above shoulder. Watch pacemaker incision site for bleeding/oozing, if seen apply firm pressure with clean cloth and call office at [phone]. Watch for signs of infection which include increasing area of redness around site, fever/hot to touch or purulent drainage. Call office or return to ER for immediate evaluation of any shortness of breath, chest pain or palpitations. Pacemaker Placement: What to Expect at [facility] placement is surgery to put a pacemaker in your chest when you have bradycardia (a slow heart rate). A pacemaker is a small, battery-powered device that sends electrical signals to the heart to keep the heartbeat steady. Thin wires, called leads, carry the signals from the pacemaker to the heart. A pacemaker can prevent or reduce dizziness, fainting, and shortness of breath caused by a slow or unsteady heartbeat. Your chest may be sore where the doctor made the cut (incision) and put in the pacemaker. You also may have a bruise and mild swelling. These symptoms usually get better in 1 to 2 weeks. You may feel a hard ridge along the incision. This usually gets softer in the months after surgery. You may be able to see or feel the outline of the pacemaker under your skin. You will probably be able to go back to work or your usual routine 1 to 2 weeks after surgery. Pacemaker batteries usually last 5 to 15 years. Your doctor will talk to you about how often you will need to have your pacemaker checked. You can safely use most household and office electronics such as kitchen appliances, electric power tools, and computers. You will need to stay away from things with strong magnetic and electrical fields such as an MRI machine (unless your pacemaker is safe for an MRI), welding equipment, and power generators.  You can use a cell phone, but keep it at least 6 inches away from your pacemaker. Check with your doctor about what you need to stay away from, what you need to use with care, and what is okay to use. This care sheet gives you a general idea about how long it will take for you to recover. But each person recovers at a different pace. Follow the steps below to get better as quickly as possible. How can you care for yourself at home? Activity  · Rest when you feel tired. · Be active. Walking is a good choice. · For 4 to 6 weeks:  ¨ Avoid activities that strain your chest or upper arm muscles. This includes pushing a  or vacuum, mopping floors, swimming, or swinging a golf club or tennis racquet. ¨ Do not raise your arm (the one on the side of your body where the pacemaker is located) above your shoulder. ¨ Allow your body to heal. Don't move quickly or lift anything heavy until you are feeling better. · Many people are able to return to work within 1 to 2 weeks after surgery. · Ask your doctor when it is okay for you to have sex. Diet  · You can eat your normal diet. If your stomach is upset, try bland, low-fat foods like plain rice, broiled chicken, toast, and yogurt. Medicines  · Your doctor will tell you if and when you can restart your medicines. He or she will also give you instructions about taking any new medicines. · If you take aspirin or some other blood thinner, be sure to talk to your doctor. He or she will tell you if and when to start taking this medicine again. Make sure that you understand exactly what your doctor wants you to do. · Be safe with medicines. Read and follow all instructions on the label. ¨ If the doctor gave you a prescription medicine for pain, take it as prescribed. ¨ If you are not taking a prescription pain medicine, ask your doctor if you can take an over-the-counter medicine.   ¨ Do not take aspirin, ibuprofen (Advil, Motrin), naproxen (Aleve), or other nonsteroidal anti-inflammatory drugs (NSAIDs) unless your doctor says it is okay. · If your doctor prescribed antibiotics, take them as directed. Do not stop taking them just because you feel better. You need to take the full course of antibiotics. Incision care  · If you have strips of tape on the incision, leave the tape on for a week or until it falls off. · Keep the incision dry while it heals. Your doctor may recommend sponge baths for about 7 days, but do not get the incision wet. Your doctor will let you know when you may take showers. After a shower, pat the incision dry. · Don't use hydrogen peroxide or alcohol on the incision, which can slow healing. You may cover the area with a gauze bandage if it oozes fluid or rubs against clothing. Change the bandage every day. · Do not take a bath or get into a hot tub for the first 2 weeks, or until your doctor tells you it is okay. Other instructions  · Keep a medical ID card with you at all times that says you have a pacemaker. The card should include the  and model information. · Wear medical alert jewelry stating that you have a pacemaker. You can buy this at most [employer]. · Check your pulse as directed by your doctor. · Have your pacemaker checked as often as your doctor recommends. In some cases, this may be done over the phone or the Internet. Your doctor will give you instructions about how to do this. Follow-up care is a key part of your treatment and safety. Be sure to make and go to all appointments, and call your doctor if you are having problems. It's also a good idea to know your test results and keep a list of the medicines you take. When should you call for help? Call 911 anytime you think you may need emergency care. For example, call if:  · You passed out (lost consciousness). · You have severe trouble breathing. · You have sudden chest pain and shortness of breath, or you cough up blood. · You have symptoms of a heart attack. These may include:  ¨ Chest pain or pressure, or a strange feeling in the chest.  ¨ Sweating. ¨ Shortness of breath. ¨ Nausea or vomiting. ¨ Pain, pressure, or a strange feeling in the back, neck, jaw, or upper belly or in one or both shoulders or arms. ¨ Lightheadedness or sudden weakness. ¨ A fast or irregular heartbeat. After you call 911, the  may tell you to chew 1 adult-strength or 2 to 4 low-dose aspirin. Wait for an ambulance. Do not try to drive yourself. · You have symptoms of a stroke. These may include:  ¨ Sudden numbness, tingling, weakness, or loss of movement in your face, arm, or leg, especially on only one side of your body. ¨ Sudden vision changes. ¨ Sudden trouble speaking. ¨ Sudden confusion or trouble understanding simple statements. ¨ Sudden problems with walking or balance. ¨ A sudden, severe headache that is different from past headaches. Call your doctor now or seek immediate medical care if:  · Your heartbeat feels very fast or slow, skips beats, or flutters. · You are dizzy or lightheaded, or you feel like you may faint. · You have new or increased shortness of breath. · You have pain that does not get better after you take pain medicine. · You hear an alarm or feel a vibration from your pacemaker. · You have loose stitches, or your incision comes open. · Bright red blood has soaked through the bandage over your incision. · You have signs of infection, such as:  ¨ Increased pain, swelling, warmth, or redness. ¨ Red streaks leading from the incision. ¨ Pus draining from the incision. ¨ A fever. · You have signs of a blood clot, such as:  ¨ Pain in your arm on the same side that the pacemaker is placed, or in your calf, back of the knee, thigh, or groin. ¨ Redness and swelling in your arm on the same side that the pacemaker is placed, or in your leg or groin.   Watch closely for changes in your health, and be sure to contact your doctor if:  · You have any problems with your pacemaker. Where can you learn more? Go to http://vira-jeffry.info/. Enter G550 in the search box to learn more about \"Pacemaker Placement: What to Expect at Home. \"  Current as of: November 11, 2016  Content Version: 11.3  © 2236-6444 [employer]. Care instructions adapted under license by [employer] (which disclaims liability or warranty for this information). If you have questions about a medical condition or this instruction, always ask your healthcare professional. [address] any warranty or liability for your use of this information.

## 2017-10-11 NOTE — PROGRESS NOTES
Pt seen by MD, discharge instructions, prescriptions, and follow up appt information given to and reviewed with patient and . Opportunity for questions provided, pt voiced understanding. All pt belongings taken with pt from room. Pt transported to discharge area via wheelchair,  driving pt home.

## 2017-10-11 NOTE — PROGRESS NOTES
Bedside shift report given to Emma Mena RN (oncoming nurse) by Mckenzie Ramirez RN (offgoing nurse). Bedside shift report included the following information: SBAR, Kardex, ED Summary, MAR, and Recent Results.

## 2018-02-27 ENCOUNTER — ANESTHESIA EVENT (OUTPATIENT)
Dept: ENDOSCOPY | Age: 83
End: 2018-02-27
Payer: MEDICARE

## 2018-02-27 RX ORDER — DIPHENHYDRAMINE HYDROCHLORIDE 50 MG/ML
12.5 INJECTION, SOLUTION INTRAMUSCULAR; INTRAVENOUS ONCE
Status: CANCELLED | OUTPATIENT
Start: 2018-02-27 | End: 2018-02-27

## 2018-02-27 RX ORDER — ONDANSETRON 2 MG/ML
4 INJECTION INTRAMUSCULAR; INTRAVENOUS ONCE
Status: CANCELLED | OUTPATIENT
Start: 2018-02-27 | End: 2018-02-27

## 2018-02-27 RX ORDER — SODIUM CHLORIDE 0.9 % (FLUSH) 0.9 %
5-10 SYRINGE (ML) INJECTION AS NEEDED
Status: CANCELLED | OUTPATIENT
Start: 2018-02-27

## 2018-02-27 RX ORDER — SODIUM CHLORIDE, SODIUM LACTATE, POTASSIUM CHLORIDE, CALCIUM CHLORIDE 600; 310; 30; 20 MG/100ML; MG/100ML; MG/100ML; MG/100ML
75 INJECTION, SOLUTION INTRAVENOUS CONTINUOUS
Status: CANCELLED | OUTPATIENT
Start: 2018-02-27

## 2018-02-27 RX ORDER — NALOXONE HYDROCHLORIDE 0.4 MG/ML
0.1 INJECTION, SOLUTION INTRAMUSCULAR; INTRAVENOUS; SUBCUTANEOUS AS NEEDED
Status: CANCELLED | OUTPATIENT
Start: 2018-02-27 | End: 2018-02-27

## 2018-02-28 ENCOUNTER — ANESTHESIA (OUTPATIENT)
Dept: ENDOSCOPY | Age: 83
End: 2018-02-28
Payer: MEDICARE

## 2018-02-28 ENCOUNTER — HOSPITAL ENCOUNTER (OUTPATIENT)
Age: 83
Setting detail: OUTPATIENT SURGERY
Discharge: HOME | End: 2018-02-28
Attending: INTERNAL MEDICINE | Admitting: INTERNAL MEDICINE
Payer: MEDICARE

## 2018-02-28 VITALS
HEART RATE: 68 BPM | WEIGHT: 145 LBS | DIASTOLIC BLOOD PRESSURE: 80 MMHG | TEMPERATURE: 97.9 F | RESPIRATION RATE: 18 BRPM | SYSTOLIC BLOOD PRESSURE: 133 MMHG | HEIGHT: 61 IN | OXYGEN SATURATION: 95 % | BODY MASS INDEX: 27.38 KG/M2

## 2018-02-28 PROCEDURE — 74011250636 HC RX REV CODE- 250/636: Performed by: ANESTHESIOLOGY

## 2018-02-28 PROCEDURE — 76040000025: Performed by: INTERNAL MEDICINE

## 2018-02-28 PROCEDURE — 74011250636 HC RX REV CODE- 250/636

## 2018-02-28 PROCEDURE — 76060000031 HC ANESTHESIA FIRST 0.5 HR: Performed by: INTERNAL MEDICINE

## 2018-02-28 PROCEDURE — 88305 TISSUE EXAM BY PATHOLOGIST: CPT | Performed by: INTERNAL MEDICINE

## 2018-02-28 PROCEDURE — 77030009426 HC FCPS BIOP ENDOSC BSC -B: Performed by: INTERNAL MEDICINE

## 2018-02-28 RX ORDER — SODIUM CHLORIDE 0.9 % (FLUSH) 0.9 %
5-10 SYRINGE (ML) INJECTION EVERY 8 HOURS
Status: DISCONTINUED | OUTPATIENT
Start: 2018-02-28 | End: 2018-02-28 | Stop reason: HOSPADM

## 2018-02-28 RX ORDER — LIDOCAINE HYDROCHLORIDE 10 MG/ML
0.1 INJECTION INFILTRATION; PERINEURAL AS NEEDED
Status: DISCONTINUED | OUTPATIENT
Start: 2018-02-28 | End: 2018-02-28 | Stop reason: HOSPADM

## 2018-02-28 RX ORDER — PROPOFOL 10 MG/ML
INJECTION, EMULSION INTRAVENOUS AS NEEDED
Status: DISCONTINUED | OUTPATIENT
Start: 2018-02-28 | End: 2018-02-28 | Stop reason: HOSPADM

## 2018-02-28 RX ORDER — SODIUM CHLORIDE, SODIUM LACTATE, POTASSIUM CHLORIDE, CALCIUM CHLORIDE 600; 310; 30; 20 MG/100ML; MG/100ML; MG/100ML; MG/100ML
1000 INJECTION, SOLUTION INTRAVENOUS CONTINUOUS
Status: DISCONTINUED | OUTPATIENT
Start: 2018-02-28 | End: 2018-02-28 | Stop reason: HOSPADM

## 2018-02-28 RX ORDER — FENTANYL CITRATE 50 UG/ML
100 INJECTION, SOLUTION INTRAMUSCULAR; INTRAVENOUS AS NEEDED
Status: DISCONTINUED | OUTPATIENT
Start: 2018-02-28 | End: 2018-02-28 | Stop reason: HOSPADM

## 2018-02-28 RX ORDER — SODIUM CHLORIDE 0.9 % (FLUSH) 0.9 %
5-10 SYRINGE (ML) INJECTION AS NEEDED
Status: DISCONTINUED | OUTPATIENT
Start: 2018-02-28 | End: 2018-02-28 | Stop reason: HOSPADM

## 2018-02-28 RX ORDER — MIDAZOLAM HYDROCHLORIDE 1 MG/ML
2 INJECTION, SOLUTION INTRAMUSCULAR; INTRAVENOUS
Status: DISCONTINUED | OUTPATIENT
Start: 2018-02-28 | End: 2018-02-28 | Stop reason: HOSPADM

## 2018-02-28 RX ORDER — PROPOFOL 10 MG/ML
INJECTION, EMULSION INTRAVENOUS
Status: DISCONTINUED | OUTPATIENT
Start: 2018-02-28 | End: 2018-02-28 | Stop reason: HOSPADM

## 2018-02-28 RX ADMIN — PROPOFOL 50 MG: 10 INJECTION, EMULSION INTRAVENOUS at 08:10

## 2018-02-28 RX ADMIN — PROPOFOL 100 MCG/KG/MIN: 10 INJECTION, EMULSION INTRAVENOUS at 08:07

## 2018-02-28 RX ADMIN — SODIUM CHLORIDE, SODIUM LACTATE, POTASSIUM CHLORIDE, AND CALCIUM CHLORIDE 1000 ML: 600; 310; 30; 20 INJECTION, SOLUTION INTRAVENOUS at 07:43

## 2018-02-28 NOTE — DISCHARGE INSTRUCTIONS
Gastrointestinal Colonoscopy/Flexible Sigmoidoscopy - Lower Exam Discharge Instructions  1. Call Dr. Demetrius Stallings at [phone] for any problems or questions. 2. Contact the doctors office for follow up appointment as directed  3. Medication may cause drowsiness for several hours, therefore, do not drive or operate machinery for remainder of the day. 4. No alcohol today. 5. Ordinarily, you may resume regular diet and activity after exam unless otherwise specified by your physician. 6. Because of air put into your colon during exam, you may experience some abdominal distension, relieved by the passage of gas, for several hours. 7. Contact your physician if you have any of the following:  a. Excessive amount of bleeding - large amount when having a bowel movement. Occasional specks of blood with bowel movement would not be unusual.  b. Severe abdominal pain  c. Fever or Chills    Any additional instructions: Follow up in office, call for appointment. No further screening colonoscopy required. Instructions given to Cosyf[place]ou and other family members.   Instructions given by:  Farshad Koch RN

## 2018-02-28 NOTE — PROCEDURES
DATE OF PROCEDURE: 2/28/18      PROCEDURE: Colonoscopy with biopsy    ENDOSCOPIST: Melissa Douglas M.D. PREOPERATIVE DIAGNOSIS: Chronic diarrhea    POSTOPERATIVE DIAGNOSIS: Diverticulosis    SEDATION: MAC    INSTRUMENT: [id number]    DESCRIPTION OF PROCEDURE:  After informed consent was obtained the patient was placed in the left lateral decubitus position. Intravenous sedation was administered as above. After adequate sedation had been achieved, digital rectal examination was performed. The colonoscope was then inserted through the anus and followed the course of the rectum and colon to the terminal ileum. The colonoscope was withdrawn from that point, performing a careful survey of the mucosa. Retroflexion was performed in the rectum. FINDINGS:  Poor prep  Terminal Ileum: Normal    Colon: Evidence of prior ileocolonic anastomosis and colocolonic anastomosis at level of proximal rectum. The remaining colon demonstrates diffuse diverticulosis with no other findings. Random biopsies obtained for microscopic colitis. Rectum: Normal other than anastomosis    Estimated Blood Loss:  0 cc-min    IMPRESSION: No cause for diarrhea. Large amount of retained solid stool      PLAN:  F/U in office. No further screening colonoscopy due to age.     Dhaval Vazquez MD

## 2018-02-28 NOTE — IP AVS SNAPSHOT
[address] 
[phone] Patient: Trudi Gomez MRN: [id number] :1934 About your hospitalization You were admitted on:  2018 You last received care in the:  [facility] You were discharged on:  2018 Why you were hospitalized Your primary diagnosis was:  Not on File Follow-up Information None Discharge Orders None A check joyce indicates which time of day the medication should be taken. My Medications ASK your doctor about these medications Instructions Each Dose to Equal  
 Morning Noon Evening Bedtime  
 alendronate 70 mg tablet Commonly known as:  FOSAMAX Your last dose was: Your next dose is: Take 1 Tab by mouth every seven (7) days. With 8 oz water, sitting upright , 30 min before other food or drink 70 mg  
    
   
   
   
  
 atorvastatin 10 mg tablet Commonly known as:  LIPITOR Your last dose was: Your next dose is: Take 1 Tab by mouth daily. 10 mg CALCIUM 600 + D 600-125 mg-unit Tab Generic drug:  calcium-cholecalciferol (d3) Your last dose was: Your next dose is: Take 1 Tab by mouth daily. 1 Tab  
    
   
   
   
  
 colestipol 1 gram tablet Commonly known as:  COLESTID Your last dose was: Your next dose is: Take 1 Tab by mouth two (2) times a day. 1 g  
    
   
   
   
  
 losartan 100 mg tablet Commonly known as:  COZAAR Your last dose was: Your next dose is: TAKE ONE TABLET BY MOUTH EVERY DAY FOR HYPERTENSION  
     
   
   
   
  
 multivitamin with iron tablet Your last dose was: Your next dose is: Take 1 Tab by mouth daily. 1 Tab VITAMIN D3 1,000 unit Cap Generic drug:  cholecalciferol Your last dose was: Your next dose is: Take 1,000 Units by mouth daily. 1000 Units Discharge Instructions Gastrointestinal Colonoscopy/Flexible Sigmoidoscopy - Lower Exam Discharge Instructions 1. Call Dr. Brittany Norwood at [phone] for any problems or questions. 2. Contact the doctors office for follow up appointment as directed 3. Medication may cause drowsiness for several hours, therefore, do not drive or operate machinery for remainder of the day. 4. No alcohol today. 5. Ordinarily, you may resume regular diet and activity after exam unless otherwise specified by your physician. 6. Because of air put into your colon during exam, you may experience some abdominal distension, relieved by the passage of gas, for several hours. 7. Contact your physician if you have any of the following: 
a. Excessive amount of bleeding  large amount when having a bowel movement. Occasional specks of blood with bowel movement would not be unusual. 
b. Severe abdominal pain 
c. Fever or Chills Any additional instructions: Follow up in office, call for appointment. No further screening colonoscopy required. Instructions given to [employer] and other family members. Instructions given by:  Roya Fuller RN 
 
 
ACO Transitions of Care Introducing [employer] [facility] offers a voluntary care coordination program to provide high quality service and care to [facility] fee-for-service beneficiaries. Román Cortes was designed to help you enhance your health and well-being through the following services: ? Transitions of Care  support for individuals who are transitioning from one care setting to another (example: Hospital to home). ?  Chronic and Complex Care Coordination  support for individuals and caregivers of those with serious or chronic illnesses or with more than one chronic (ongoing) condition and those who take a number of different medications. If you meet specific medical criteria, a Critical access hospital [address] may call you directly to coordinate your care with your primary care physician and your other care providers. For questions about the [facility] programs, please, contact your physicians office. For general questions or additional information about Accountable Care Organizations: 
Please visit www.medicare.gov/acos. html or call 1-800-MEDICARE (7-478.140.9174) TTY users should call 8-767.471.1748. Introducing [facility]! Virginia Simon introduces F[employer]ye patient portal. Now you can access parts of your medical record, email your doctor's office, and request medication refills online. 1. In your internet browser, go to [email or web address] 2. Click on the First Time User? Click Here link in the Sign In box. You will see the New Member Sign Up page. 3. Enter your Fareye Access Code exactly as it appears below. You will not need to use this code after youve completed the sign-up process. If you do not sign up before the expiration date, you must request a new code. · F[employer]ye Access Code: [email or web address] Expires: 5/1/2018  8:09 AM 
 
4. Enter the last four digits of your Social Security Number (xxxx) and Date of Birth (mm/dd/yyyy) as indicated and click Submit. You will be taken to the next sign-up page. 5. Create a Scholrlyt ID. This will be your Fareye login ID and cannot be changed, so think of one that is secure and easy to remember. 6. Create a Fareye password. You can change your password at any time. 7. Enter your Password Reset Question and Answer. This can be used at a later time if you forget your password. 8. Enter your e-mail address.  You will receive e-mail notification when new information is available in [employer]. 9. Click Sign Up. You can now view and download portions of your medical record. 10. Click the Download Summary menu link to download a portable copy of your medical information. If you have questions, please visit the Frequently Asked Questions section of the [employer] website. Remember, [employer] is NOT to be used for urgent needs. For medical emergencies, dial 911. Now available from your iPhone and Android! Providers Seen During Your Hospitalization Provider Specialty Primary office phone Mai Murguia MD Gastroenterology [phone] Your Primary Care Physician (PCP) Primary Care Physician Office Phone Office Fax Pilar Duran [phone] You are allergic to the following Allergen Reactions Lortab (Hydrocodone-Acetaminophen) Nausea and Vomiting Rash Other (comments) Lisinopril Cough Antihypertensives Other Medication Nausea Only Narcotics Recent Documentation Height Weight BMI OB Status Smoking Status 1.549 m 65.8 kg 27.4 kg/m2 Hysterectomy Never Smoker Emergency Contacts Name Discharge Info Relation Home Work Mobile Emmanuel Stone DISCHARGE CAREGIVER [3] Spouse [3] [phone] [address] [place] [address] CAREGIVER [3] Son [22] [phone] Patient Belongings The following personal items are in your possession at time of discharge: 
  Dental Appliances: At home  Visual Aid: None Please provide this summary of care documentation to your next provider. Signatures-by signing, you are acknowledging that this After Visit Summary has been reviewed with you and you have received a copy. Patient Signature:  ____________________________________________________________ Date:  ____________________________________________________________  
  
Johny Puri  Provider Signature: ____________________________________________________________ Date:  ____________________________________________________________

## 2018-02-28 NOTE — ANESTHESIA PREPROCEDURE EVALUATION
Anesthetic History   No history of anesthetic complications            Review of Systems / Medical History  Patient summary reviewed and pertinent labs reviewed    Pulmonary  Within defined limits                 Neuro/Psych   Within defined limits           Cardiovascular    Hypertension: well controlled          Pacemaker and hyperlipidemia    Exercise tolerance: >4 METS     GI/Hepatic/Renal  Within defined limits              Endo/Other  Within defined limits           Other Findings   Comments: Sciatica         Physical Exam    Airway  Mallampati: II  TM Distance: 4 - 6 cm  Neck ROM: normal range of motion   Mouth opening: Normal     Cardiovascular    Rhythm: regular  Rate: normal         Dental  No notable dental hx       Pulmonary  Breath sounds clear to auscultation               Abdominal  GI exam deferred       Other Findings            Anesthetic Plan    ASA: 2  Anesthesia type: total IV anesthesia          Induction: Intravenous  Anesthetic plan and risks discussed with: Patient

## 2018-02-28 NOTE — ANESTHESIA POSTPROCEDURE EVALUATION
Post-Anesthesia Evaluation and Assessment    Patient: Nilam Liu MRN: [id number]  SSN: [id number]    YOB: 1934  Age: 80 y.o. Sex: female       Cardiovascular Function/Vital Signs  Visit Vitals    BP 97/53    Pulse 60    Temp 36.6 °C (97.9 °F)    Resp 16    Ht 5' 1\" (1.549 m)    Wt 65.8 kg (145 lb)    SpO2 99%    BMI 27.4 kg/m2       Patient is status post total IV anesthesia anesthesia for Procedure(s):  COLONOSCOPY  BMI 28  COLON BIOPSY. Nausea/Vomiting: None    Postoperative hydration reviewed and adequate. Pain:  Pain Scale 1: Visual (02/28/18 0826)  Pain Intensity 1: 0 (02/28/18 0826)   Managed    Neurological Status: At baseline    Mental Status and Level of Consciousness: Arousable    Pulmonary Status:   O2 Device: Nasal cannula (02/28/18 0826)   Adequate oxygenation and airway patent    Complications related to anesthesia: None    Post-anesthesia assessment completed.  No concerns    Signed By: Eze Contreras MD     February 28, 2018

## 2018-02-28 NOTE — H&P
Gastroenterology Outpatient History and Physical    Patient: Padmaja Stone    Physician: Zahira Martinez MD    Vital Signs: Blood pressure 154/72, pulse 60, temperature 97.9 °F (36.6 °C), resp. rate 16, height 5' 1\" (1.549 m), weight 65.8 kg (145 lb), SpO2 99 %. Allergies:    Allergies   Allergen Reactions    Lortab [Hydrocodone-Acetaminophen] Nausea and Vomiting, Rash and Other (comments)    Lisinopril Cough     Antihypertensives    Other Medication Nausea Only     Narcotics       Chief Complaint: Diarrhea    History of Present Illness: Chronic diarrhea, improved but not resolved with Colestid    Justification for Procedure: evaluation    History:  Past Medical History:   Diagnosis Date    Essential hypertension     HLD (hyperlipidemia)     Osteopenia     Sciatica       Past Surgical History:   Procedure Laterality Date    ABDOMEN SURGERY PROC UNLISTED  7/07    exploratory    HX COLECTOMY      HX COLOSTOMY      and reversal    HX GI      rectocele    HX HEENT      Eye Surgery -Detached retina- Left    HX HERNIA REPAIR  12/2010    HX HYSTERECTOMY      HX OOPHORECTOMY      HX ORTHOPAEDIC Bilateral 6-2008    Hand Surgery Bilateral    HX ORTHOPAEDIC Right 10/2014    Achilles tendon repair    HX ORTHOPAEDIC  2010    knee surgery    HX TUMOR REMOVAL  2007    Colorectal Malignancy, in remission, chemo x6 month    REPAIR OF RECTOCELE        Social History     Social History    Marital status:      Spouse name: N/A    Number of children: N/A    Years of education: N/A     Social History Main Topics    Smoking status: Never Smoker    Smokeless tobacco: Never Used    Alcohol use No    Drug use: No    Sexual activity: Not Asked     Other Topics Concern    Exercise Yes     walks at [employer] and does home exercises     Social History Narrative      Family History   Problem Relation Age of Onset    Cancer Mother     Hypertension Mother     Cancer Father      colorectal malignancy    Cancer Sister      melanoma    Heart Disease Sister     Heart Disease Brother     Stroke Brother     Other Sister      Lung abcess    Seizures Son      brain tumor removed    Malignant Hyperthermia Neg Hx     Pseudocholinesterase Deficiency Neg Hx     Delayed Awakening Neg Hx     Post-op Nausea/Vomiting Neg Hx     Emergence Delirium Neg Hx     Post-op Cognitive Dysfunction Neg Hx        Medications:   Prior to Admission medications    Medication Sig Start Date End Date Taking? Authorizing Provider   losartan (COZAAR) 100 mg tablet TAKE ONE TABLET BY MOUTH EVERY DAY FOR HYPERTENSION 2/13/18  Yes Peter Abad MD   atorvastatin (LIPITOR) 10 mg tablet Take 1 Tab by mouth daily. 2/13/18  Yes Peter Abad MD   alendronate (FOSAMAX) 70 mg tablet Take 1 Tab by mouth every seven (7) days. With 8 oz water, sitting upright , 30 min before other food or drink 2/13/18  Yes Peter Abad MD   colestipol (COLESTID) 1 gram tablet Take 1 Tab by mouth two (2) times a day. 2/13/18  Yes Peter Abad MD   calcium-cholecalciferol, d3, (CALCIUM 600 + D) 600-125 mg-unit tab Take 1 Tab by mouth daily. Yes Historical Provider   cholecalciferol (VITAMIN D3) 1,000 unit cap Take 1,000 Units by mouth daily. Yes Historical Provider   multivitamin with iron tablet Take 1 Tab by mouth daily. Yes Historical Provider       Physical Exam:   General: alert, cooperative, no distress   HEENT: Head: Normocephalic, no lesions, without obvious abnormality.    Heart: regular rate and rhythm, S1, S2 normal, no murmur, click, rub or gallop   Lungs: chest clear, no wheezing, rales, normal symmetric air entry   Abdominal: Bowel sounds are normal, liver is not enlarged, spleen is not enlarged   Neurological: Grossly normal   Extremities: extremities normal, atraumatic, no cyanosis or edema     Findings/Diagnosis: Chronic diarrhea    Plan of Care/Planned Procedure: Colonoscopy    Signed By: Amos Short MD February 28, 2018

## 2018-05-02 PROBLEM — R00.1 BRADYCARDIA: Status: ACTIVE | Noted: 2018-05-02

## 2019-01-04 PROBLEM — Z95.0 HISTORY OF PACEMAKER: Status: ACTIVE | Noted: 2019-01-04

## 2019-01-04 PROBLEM — R05.9 COUGH: Status: ACTIVE | Noted: 2019-01-04

## 2019-01-12 ENCOUNTER — APPOINTMENT (OUTPATIENT)
Dept: GENERAL RADIOLOGY | Age: 84
End: 2019-01-12
Payer: MEDICARE

## 2019-01-12 ENCOUNTER — APPOINTMENT (OUTPATIENT)
Dept: CT IMAGING | Age: 84
End: 2019-01-12
Payer: MEDICARE

## 2019-01-12 ENCOUNTER — HOSPITAL ENCOUNTER (EMERGENCY)
Age: 84
Discharge: HOME | End: 2019-01-12
Payer: MEDICARE

## 2019-01-12 VITALS
OXYGEN SATURATION: 99 % | HEIGHT: 61 IN | TEMPERATURE: 97.4 F | SYSTOLIC BLOOD PRESSURE: 172 MMHG | WEIGHT: 153 LBS | HEART RATE: 60 BPM | RESPIRATION RATE: 14 BRPM | BODY MASS INDEX: 28.89 KG/M2 | DIASTOLIC BLOOD PRESSURE: 72 MMHG

## 2019-01-12 DIAGNOSIS — R42 VERTIGO: ICD-10-CM

## 2019-01-12 DIAGNOSIS — S32.511A CLOSED FRACTURE OF SUPERIOR RAMUS OF RIGHT PUBIS, INITIAL ENCOUNTER (HCC): Primary | ICD-10-CM

## 2019-01-12 LAB
ALBUMIN SERPL-MCNC: 4 G/DL (ref 3.2–4.6)
ALBUMIN/GLOB SERPL: 1.3 {RATIO} (ref 1.2–3.5)
ALP SERPL-CCNC: 90 U/L (ref 50–136)
ALT SERPL-CCNC: 26 U/L (ref 12–65)
ANION GAP SERPL CALC-SCNC: 11 MMOL/L (ref 7–16)
AST SERPL-CCNC: 23 U/L (ref 15–37)
BASOPHILS # BLD: 0 K/UL (ref 0–0.2)
BASOPHILS NFR BLD: 0 % (ref 0–2)
BILIRUB SERPL-MCNC: 0.4 MG/DL (ref 0.2–1.1)
BUN SERPL-MCNC: 20 MG/DL (ref 8–23)
CALCIUM SERPL-MCNC: 10.2 MG/DL (ref 8.3–10.4)
CHLORIDE SERPL-SCNC: 101 MMOL/L (ref 98–107)
CO2 SERPL-SCNC: 24 MMOL/L (ref 21–32)
CREAT SERPL-MCNC: 0.9 MG/DL (ref 0.6–1)
DIFFERENTIAL METHOD BLD: ABNORMAL
EOSINOPHIL # BLD: 0.1 K/UL (ref 0–0.8)
EOSINOPHIL NFR BLD: 1 % (ref 0.5–7.8)
ERYTHROCYTE [DISTWIDTH] IN BLOOD BY AUTOMATED COUNT: 12.3 % (ref 11.9–14.6)
GLOBULIN SER CALC-MCNC: 3.1 G/DL (ref 2.3–3.5)
GLUCOSE SERPL-MCNC: 160 MG/DL (ref 65–100)
HCT VFR BLD AUTO: 44.5 % (ref 35.8–46.3)
HGB BLD-MCNC: 14.6 G/DL (ref 11.7–15.4)
IMM GRANULOCYTES # BLD AUTO: 0.1 K/UL (ref 0–0.5)
IMM GRANULOCYTES NFR BLD AUTO: 1 % (ref 0–5)
LYMPHOCYTES # BLD: 3.5 K/UL (ref 0.5–4.6)
LYMPHOCYTES NFR BLD: 32 % (ref 13–44)
MCH RBC QN AUTO: 30.3 PG (ref 26.1–32.9)
MCHC RBC AUTO-ENTMCNC: 32.8 G/DL (ref 31.4–35)
MCV RBC AUTO: 92.3 FL (ref 79.6–97.8)
MONOCYTES # BLD: 0.5 K/UL (ref 0.1–1.3)
MONOCYTES NFR BLD: 5 % (ref 4–12)
NEUTS SEG # BLD: 6.9 K/UL (ref 1.7–8.2)
NEUTS SEG NFR BLD: 62 % (ref 43–78)
NRBC # BLD: 0 K/UL (ref 0–0.2)
PLATELET # BLD AUTO: 270 K/UL (ref 150–450)
PMV BLD AUTO: 10.4 FL (ref 9.4–12.3)
POTASSIUM SERPL-SCNC: 3.9 MMOL/L (ref 3.5–5.1)
PROT SERPL-MCNC: 7.1 G/DL (ref 6.3–8.2)
RBC # BLD AUTO: 4.82 M/UL (ref 4.05–5.2)
SODIUM SERPL-SCNC: 136 MMOL/L (ref 136–145)
WBC # BLD AUTO: 11.2 K/UL (ref 4.3–11.1)

## 2019-01-12 PROCEDURE — 80053 COMPREHEN METABOLIC PANEL: CPT

## 2019-01-12 PROCEDURE — 74011250637 HC RX REV CODE- 250/637

## 2019-01-12 PROCEDURE — 99285 EMERGENCY DEPT VISIT HI MDM: CPT

## 2019-01-12 PROCEDURE — 96375 TX/PRO/DX INJ NEW DRUG ADDON: CPT

## 2019-01-12 PROCEDURE — 70450 CT HEAD/BRAIN W/O DYE: CPT

## 2019-01-12 PROCEDURE — 93005 ELECTROCARDIOGRAM TRACING: CPT

## 2019-01-12 PROCEDURE — 96361 HYDRATE IV INFUSION ADD-ON: CPT

## 2019-01-12 PROCEDURE — 85025 COMPLETE CBC W/AUTO DIFF WBC: CPT

## 2019-01-12 PROCEDURE — 74011250636 HC RX REV CODE- 250/636

## 2019-01-12 PROCEDURE — 71045 X-RAY EXAM CHEST 1 VIEW: CPT

## 2019-01-12 PROCEDURE — 73502 X-RAY EXAM HIP UNI 2-3 VIEWS: CPT

## 2019-01-12 PROCEDURE — 96374 THER/PROPH/DIAG INJ IV PUSH: CPT

## 2019-01-12 PROCEDURE — 73552 X-RAY EXAM OF FEMUR 2/>: CPT

## 2019-01-12 RX ORDER — ONDANSETRON 8 MG/1
8 TABLET, ORALLY DISINTEGRATING ORAL
Status: COMPLETED | OUTPATIENT
Start: 2019-01-12 | End: 2019-01-12

## 2019-01-12 RX ORDER — HYDROMORPHONE HYDROCHLORIDE 1 MG/ML
0.5 INJECTION, SOLUTION INTRAMUSCULAR; INTRAVENOUS; SUBCUTANEOUS
Status: COMPLETED | OUTPATIENT
Start: 2019-01-12 | End: 2019-01-12

## 2019-01-12 RX ORDER — ONDANSETRON 8 MG/1
8 TABLET, ORALLY DISINTEGRATING ORAL
Qty: 14 TAB | Refills: 0 | Status: SHIPPED | OUTPATIENT
Start: 2019-01-12 | End: 2019-01-31

## 2019-01-12 RX ORDER — MECLIZINE HYDROCHLORIDE 25 MG/1
25 TABLET ORAL
Status: COMPLETED | OUTPATIENT
Start: 2019-01-12 | End: 2019-01-12

## 2019-01-12 RX ORDER — HYDROCODONE BITARTRATE AND ACETAMINOPHEN 5; 325 MG/1; MG/1
1 TABLET ORAL
Qty: 20 TAB | Refills: 0 | Status: SHIPPED | OUTPATIENT
Start: 2019-01-12 | End: 2019-01-15 | Stop reason: SDUPTHER

## 2019-01-12 RX ORDER — ONDANSETRON 2 MG/ML
4 INJECTION INTRAMUSCULAR; INTRAVENOUS
Status: COMPLETED | OUTPATIENT
Start: 2019-01-12 | End: 2019-01-12

## 2019-01-12 RX ADMIN — MECLIZINE HYDROCHLORIDE 25 MG: 25 TABLET ORAL at 14:57

## 2019-01-12 RX ADMIN — ONDANSETRON 8 MG: 8 TABLET, ORALLY DISINTEGRATING ORAL at 13:26

## 2019-01-12 RX ADMIN — SODIUM CHLORIDE 1000 ML: 900 INJECTION, SOLUTION INTRAVENOUS at 13:53

## 2019-01-12 RX ADMIN — ONDANSETRON 4 MG: 2 INJECTION INTRAMUSCULAR; INTRAVENOUS at 13:53

## 2019-01-12 RX ADMIN — HYDROMORPHONE HYDROCHLORIDE 0.5 MG: 1 INJECTION, SOLUTION INTRAMUSCULAR; INTRAVENOUS; SUBCUTANEOUS at 13:52

## 2019-01-12 NOTE — ED TRIAGE NOTES
Patient brought in by [facility] from home. Patient fell from standing position onto right hip. Patient says she also hit the right side of her head without LOC. EMS reports stable vitals.

## 2019-01-12 NOTE — ED NOTES
I have reviewed discharge instructions with the patient's . The patient's  verbalized understanding. Patient left ED via Discharge Method: stretcher to Home with [facility]. Opportunity for questions and clarification provided. Patient given 2 scripts. To continue your aftercare when you leave the hospital, you may receive an automated call from our care team to check in on how you are doing. This is a free service and part of our promise to provide the best care and service to meet your aftercare needs.  If you have questions, or wish to unsubscribe from this service please call [phone]. Thank you for Choosing our [facility] Emergency Department.

## 2019-01-12 NOTE — ED PROVIDER NOTES
80-year-old female trip and fall at home landing on her right hip and striking head. No loss of consciousness. The history is provided by the patient. Hip Pain This is a new problem. The current episode started 1 to 2 hours ago. The problem occurs constantly. The problem has not changed since onset. The pain is present in the right hip. The pain is at a severity of 10/10. The pain is severe. Associated symptoms include limited range of motion. Pertinent negatives include no numbness. She has tried nothing for the symptoms. There has been a history of trauma. Past Medical History:  
Diagnosis Date  Essential hypertension  HLD (hyperlipidemia)  Osteopenia  Sciatica Past Surgical History:  
Procedure Laterality Date  ABDOMEN SURGERY PROC UNLISTED  7/07  
 exploratory  COLONOSCOPY N/A 2/28/2018 COLONOSCOPY  BMI 28 performed by Mandy Harrison MD at [facility] ENDOSCOPY  
 HX CATARACT REMOVAL Bilateral 2018  HX COLECTOMY  HX COLOSTOMY    
 and reversal  
 HX GI    
 rectocele  HX HEENT   Eye Surgery -Detached retina- Left  HX HERNIA REPAIR  12/2010  HX HYSTERECTOMY  HX OOPHORECTOMY  HX ORTHOPAEDIC Bilateral 6-2008 Hand Surgery Bilateral  
 HX ORTHOPAEDIC Right 10/2014 Achilles tendon repair  HX ORTHOPAEDIC  2010  
 knee surgery  HX TUMOR REMOVAL  2007 Colorectal Malignancy, in remission, chemo x6 month  REPAIR OF RECTOCELE Family History:  
Problem Relation Age of Onset  Cancer Mother  Hypertension Mother  Cancer Father   
     colorectal malignancy  Cancer Sister   
     melanoma  Heart Disease Sister  Heart Disease Brother  Stroke Brother  Other Sister Lung abcess  Seizures Son   
     brain tumor removed  Malignant Hyperthermia Neg Hx  Pseudocholinesterase Deficiency Neg Hx  Delayed Awakening Neg Hx  Post-op Nausea/Vomiting Neg Hx  Emergence Delirium Neg Hx  Post-op Cognitive Dysfunction Neg Hx Social History Socioeconomic History  Marital status:  Spouse name: Not on file  Number of children: Not on file  Years of education: Not on file  Highest education level: Not on file Social Needs  Financial resource strain: Not on file  Food insecurity - worry: Not on file  Food insecurity - inability: Not on file  Transportation needs - medical: Not on file  Transportation needs - non-medical: Not on file Occupational History  Not on file Tobacco Use  Smoking status: Never Smoker  Smokeless tobacco: Never Used Substance and Sexual Activity  Alcohol use: No  
 Drug use: No  
 Sexual activity: Not on file Other Topics Concern [email or web address] Service Not Asked  Blood Transfusions Not Asked  Caffeine Concern Not Asked  Occupational Exposure Not Asked Eleonora Deist Hazards Not Asked  Sleep Concern Not Asked  Stress Concern Not Asked  Weight Concern Not Asked  Special Diet Not Asked  Back Care Not Asked  Exercise Yes Comment: walks at [employer] and does home exercises  Bike Helmet Not Asked  Seat Belt Not Asked  Self-Exams Not Asked Social History Narrative  Not on file ALLERGIES: Lortab [hydrocodone-acetaminophen]; Lisinopril; and Other medication Review of Systems Constitutional: Negative. Negative for activity change. HENT: Negative. Eyes: Negative. Respiratory: Negative. Cardiovascular: Negative. Gastrointestinal: Negative. Genitourinary: Negative. Musculoskeletal: Negative. Skin: Negative. Neurological: Negative. Negative for numbness. Psychiatric/Behavioral: Negative. All other systems reviewed and are negative. Vitals:  
 01/12/19 1301 BP: 180/74 Pulse: 60 Resp: 14 Temp: 97.4 °F (36.3 °C) SpO2: 99% Weight: 69.4 kg (153 lb) Height: 5' 1\" (1.549 m) Physical Exam  
 Constitutional: She is oriented to person, place, and time. She appears well-developed and well-nourished. No distress. HENT:  
Head: Normocephalic and atraumatic. Right Ear: External ear normal.  
Left Ear: External ear normal.  
Nose: Nose normal.  
Eyes: Conjunctivae and EOM are normal. Pupils are equal, round, and reactive to light. Right eye exhibits no discharge. Left eye exhibits no discharge. No scleral icterus. Neck: Normal range of motion. Cardiovascular: Regular rhythm. Pulmonary/Chest: Effort normal and breath sounds normal. No stridor. No respiratory distress. She has no wheezes. She has no rales. Abdominal: Soft. Bowel sounds are normal. She exhibits no distension. There is no tenderness. Musculoskeletal:  
     Right hip: She exhibits decreased range of motion and tenderness. Neurological: She is alert and oriented to person, place, and time. She exhibits normal muscle tone. Coordination normal.  
Skin: Skin is warm and dry. No rash noted. Psychiatric: She has a normal mood and affect. Her behavior is normal.  
  
 
MDM Number of Diagnoses or Management Options Diagnosis management comments: Assessment pelvic fracture and vertigo. Patient came vertiginous when she was riding in the ambulance. She has a history of vertigo. Her fall was not related to the vertigo she tripped mechanically Amount and/or Complexity of Data Reviewed Clinical lab tests: ordered and reviewed Tests in the radiology section of CPT®: ordered and reviewed Tests in the medicine section of CPT®: ordered and reviewed Risk of Complications, Morbidity, and/or Mortality Presenting problems: moderate Diagnostic procedures: moderate Management options: moderate Procedures

## 2019-01-12 NOTE — DISCHARGE INSTRUCTIONS
Patient Education        Epley Maneuver at Home for Vertigo: Exercises  Your Care Instructions  Vertigo is a spinning or whirling sensation when you move your head. Your doctor may have moved you in different positions to help your vertigo get better faster. This is called the Epley maneuver. Your doctor also may have asked you to do these exercises at home. Do the exercises as often as your doctor recommends. If your vertigo is getting worse, your doctor may have you change the exercise or stop it. Step 1  Step 1    1. Sit on the edge of a bed or sofa. Step 2    1. Turn your head 45 degrees in the direction your doctor told you to. This should be toward the ear that causes the most vertigo for you. In this picture, the woman is turning toward her left ear. Step 3    1. Tilt yourself backward until you are lying on your back. Your head should still be at a 45-degree turn. Your head should be about midway between looking straight ahead and looking out to your side. Hold for 30 seconds. If you have vertigo, stay in this position until it stops. Step 4    1. Turn your head 90 degrees toward the ear that has the least vertigo. In this picture, the woman is turning to the right because she has vertigo on her left side. The point of your chin should be raised and over your shoulder. Hold for 30 seconds. Step 5    1. Roll onto the side with the least vertigo. You should now be looking at the floor. Hold for 30 seconds. Follow-up care is a key part of your treatment and safety. Be sure to make and go to all appointments, and call your doctor if you are having problems. It's also a good idea to know your test results and keep a list of the medicines you take. Where can you learn more? Go to http://vira-jeffry.info/. Enter 470 1019 in the search box to learn more about \"Epley Maneuver at Home for Vertigo: Exercises. \"  Current as of: June 4, 2018  Content Version: 11.8  © 1465-5450 Healthwise, [employer]. Care instructions adapted under license by [employer] (which disclaims liability or warranty for this information). If you have questions about a medical condition or this instruction, always ask your healthcare professional. [place] [address] any warranty or liability for your use of this information. Patient Education        Broken Pelvis: Care Instructions  Your Care Instructions    The pelvis is the ring of bones between your hips. It connects to the spine and to the leg bones at the hip joints. Blood vessels, nerves, and muscles run through the pelvic ring and can be affected by a break. A broken pelvis also can affect the organs in your pelvic area. A broken pelvis may need a few months to heal. You may have had surgery to repair your pelvis, depending on where it was broken and how bad the break was. Your doctor may have put metal screws, pins, or a artur in your pelvis to fix the break. In some cases, surgery is not needed. While your pelvis heals, you will need to keep weight off the hips. Once you are able to walk, a walker or crutches can help you get around. You can help your pelvis heal with care at home. Your doctor may prescribe medicine to relieve pain and prevent blood clots. You heal best when you take good care of yourself. Eat a variety of healthy foods, and don't smoke. Follow-up care is a key part of your treatment and safety. Be sure to make and go to all appointments, and call your doctor if you are having problems. It's also a good idea to know your test results and keep a list of the medicines you take. How can you care for yourself at home? · Put ice or a cold pack on the painful area for 10 to 20 minutes at a time. Try to do this every 1 to 2 hours for the next 3 days (when you are awake). Put a thin cloth between the ice and your skin. · Be safe with medicines. Take pain medicines exactly as directed.   ? If the doctor gave you a prescription medicine for pain, take it as prescribed. ? If you are not taking a prescription pain medicine, ask your doctor if you can take an over-the-counter medicine. · Put only as much weight on each leg as your doctor tells you to. He or she may advise you to use crutches, a walker, or a cane to help you walk. · Avoid constipation. ? Include fruits, vegetables, beans, and whole grains in your diet each day. These foods are high in fiber. ? Drink plenty of fluids, enough so that your urine is light yellow or clear like water. If you have kidney, heart, or liver disease and have to limit fluids, talk with your doctor before you increase the amount of fluids you drink. ? Get some exercise every day, once you are able to walk and your doctor tells you it is okay to exercise. Build up slowly to 30 to 60 minutes a day on 5 or more days of the week. ? Take a fiber supplement, such as Citrucel or Metamucil, every day if needed. Read and follow all instructions on the label. ? Schedule time each day for a bowel movement. Having a daily routine may help. Take your time and do not strain when having a bowel movement. When should you call for help? Call 911 anytime you think you may need emergency care. For example, call if:    · You have chest pain, are short of breath, or you cough up blood.    Call your doctor now or seek immediate medical care if:    · You have new or worse pain.     · Your foot is cool or pale or changes color.     · You have tingling, weakness, or numbness in your foot and toes.     · You have signs of a blood clot in your leg (called a deep vein thrombosis), such as:  ? Pain in your calf, back of the knee, thigh, or groin. ? Redness or swelling in your leg.    Watch closely for changes in your health, and be sure to contact your doctor if:    · You do not get better as expected. Where can you learn more? Go to http://vira-jeffry.info/.   Enter O596 in the search box to learn more about \"Broken Pelvis: Care Instructions. \"  Current as of: November 29, 2017  Content Version: 11.8  © 6626-4346 Healthwise, [employer]. Care instructions adapted under license by [employer] (which disclaims liability or warranty for this information). If you have questions about a medical condition or this instruction, always ask your healthcare professional. [place] [address] any warranty or liability for your use of this information.

## 2019-01-13 LAB
ATRIAL RATE: 60 BPM
CALCULATED P AXIS, ECG09: 50 DEGREES
CALCULATED R AXIS, ECG10: -38 DEGREES
CALCULATED T AXIS, ECG11: 35 DEGREES
DIAGNOSIS, 93000: NORMAL
P-R INTERVAL, ECG05: 216 MS
Q-T INTERVAL, ECG07: 412 MS
QRS DURATION, ECG06: 92 MS
QTC CALCULATION (BEZET), ECG08: 412 MS
VENTRICULAR RATE, ECG03: 60 BPM

## 2019-01-14 ENCOUNTER — HOME HEALTH ADMISSION (OUTPATIENT)
Dept: HOME HEALTH SERVICES | Facility: HOME HEALTH | Age: 84
End: 2019-01-14
Payer: MEDICARE

## 2019-01-14 NOTE — PROGRESS NOTES
[facility] Face to Face Encounter Patients Name: Erika Gallagher    YOB: 1934 Ordering Physician: Primary Diagnosis:  Closed fracture of superior ramus of right pubis, [facility]; Vertigo [R42] Date of Face to Face:   1/14/2019 Face to Face Encounter findings are related to primary reason for home care:   yes. 1. I certify that the patient needs intermittent care as follows: skilled nursing care:  skilled observation/assessment, patient education 
physical therapy: strengthening, transfer training, gait/stair training, balance training and pt/caregiver education 2. I certify that this patient is homebound, that is: 1) patient requires the use of a walker and wheelchair device, special transportation, or assistance of another to leave the home; or 2) patient's condition makes leaving the home medically contraindicated; and 3) patient has a normal inability to leave the home and leaving the home requires considerable and taxing effort. Patient may leave the home for infrequent and short duration for medical reasons, and occasional absences for non-medical reasons. Homebound status is due to the following functional limitations: Patient with strength deficits limiting the performance of all ADL's without caregiver assistance or the use of an assistive device. Patient with poor safety awareness and is at risk for falls without assistance of another person and the use of an assistive device. Patient with poor ambulation endurance limiting their safe ability to ascend/descend the required number of steps to leave the home. 3. I certify that this patient is under my care and that I, or a nurse practitioner or [address] [place] [address], or clinical nurse specialist, or certified nurse midwife, working with me, had a Face-to-Face Encounter that meets the physician Face-to-Face Encounter requirements.   The following are the clinical findings from the [address] [place] [address] encounter that support the need for skilled services and is a summary of the encounter:  
 
See attached progess note Jayla Roberson 1/14/2019 THE FOLLOWING TO BE COMPLETED BY THE COMMUNITY PHYSICIAN: 
 
I concur with the findings described above from the F2F encounter that this patient is homebound and in need of a skilled service. Certifying Physician: _____________________________________ Printed Certifying Physician Name: _____________________________________ Date: _________________

## 2019-01-15 ENCOUNTER — HOME CARE VISIT (OUTPATIENT)
Dept: SCHEDULING | Facility: HOME HEALTH | Age: 84
End: 2019-01-15
Payer: MEDICARE

## 2019-01-15 VITALS
HEART RATE: 60 BPM | SYSTOLIC BLOOD PRESSURE: 160 MMHG | DIASTOLIC BLOOD PRESSURE: 78 MMHG | RESPIRATION RATE: 20 BRPM | TEMPERATURE: 98 F

## 2019-01-15 PROCEDURE — G0151 HHCP-SERV OF PT,EA 15 MIN: HCPCS

## 2019-01-15 PROCEDURE — 400013 HH SOC

## 2019-01-15 PROCEDURE — 3331090002 HH PPS REVENUE DEBIT

## 2019-01-15 PROCEDURE — 3331090001 HH PPS REVENUE CREDIT

## 2019-01-16 PROCEDURE — 3331090001 HH PPS REVENUE CREDIT

## 2019-01-16 PROCEDURE — 3331090002 HH PPS REVENUE DEBIT

## 2019-01-17 ENCOUNTER — HOME CARE VISIT (OUTPATIENT)
Dept: SCHEDULING | Facility: HOME HEALTH | Age: 84
End: 2019-01-17
Payer: MEDICARE

## 2019-01-17 VITALS
DIASTOLIC BLOOD PRESSURE: 64 MMHG | SYSTOLIC BLOOD PRESSURE: 136 MMHG | HEART RATE: 70 BPM | RESPIRATION RATE: 17 BRPM | TEMPERATURE: 98.2 F

## 2019-01-17 PROCEDURE — 3331090001 HH PPS REVENUE CREDIT

## 2019-01-17 PROCEDURE — 3331090002 HH PPS REVENUE DEBIT

## 2019-01-17 PROCEDURE — G0152 HHCP-SERV OF OT,EA 15 MIN: HCPCS

## 2019-01-18 ENCOUNTER — HOME CARE VISIT (OUTPATIENT)
Dept: SCHEDULING | Facility: HOME HEALTH | Age: 84
End: 2019-01-18
Payer: MEDICARE

## 2019-01-18 PROCEDURE — 3331090001 HH PPS REVENUE CREDIT

## 2019-01-18 PROCEDURE — G0151 HHCP-SERV OF PT,EA 15 MIN: HCPCS

## 2019-01-18 PROCEDURE — 3331090002 HH PPS REVENUE DEBIT

## 2019-01-19 VITALS
RESPIRATION RATE: 16 BRPM | SYSTOLIC BLOOD PRESSURE: 130 MMHG | TEMPERATURE: 97.9 F | DIASTOLIC BLOOD PRESSURE: 74 MMHG | HEART RATE: 71 BPM

## 2019-01-19 PROCEDURE — 3331090001 HH PPS REVENUE CREDIT

## 2019-01-19 PROCEDURE — 3331090002 HH PPS REVENUE DEBIT

## 2019-01-20 PROCEDURE — 3331090002 HH PPS REVENUE DEBIT

## 2019-01-20 PROCEDURE — 3331090001 HH PPS REVENUE CREDIT

## 2019-01-21 PROCEDURE — 3331090002 HH PPS REVENUE DEBIT

## 2019-01-21 PROCEDURE — 3331090001 HH PPS REVENUE CREDIT

## 2019-01-22 ENCOUNTER — HOME CARE VISIT (OUTPATIENT)
Dept: HOME HEALTH SERVICES | Facility: HOME HEALTH | Age: 84
End: 2019-01-22
Payer: MEDICARE

## 2019-01-22 ENCOUNTER — HOME CARE VISIT (OUTPATIENT)
Dept: SCHEDULING | Facility: HOME HEALTH | Age: 84
End: 2019-01-22
Payer: MEDICARE

## 2019-01-22 PROCEDURE — 3331090002 HH PPS REVENUE DEBIT

## 2019-01-22 PROCEDURE — G0157 HHC PT ASSISTANT EA 15: HCPCS

## 2019-01-22 PROCEDURE — 3331090001 HH PPS REVENUE CREDIT

## 2019-01-23 ENCOUNTER — HOME CARE VISIT (OUTPATIENT)
Dept: SCHEDULING | Facility: HOME HEALTH | Age: 84
End: 2019-01-23
Payer: MEDICARE

## 2019-01-23 VITALS
RESPIRATION RATE: 17 BRPM | TEMPERATURE: 97.9 F | DIASTOLIC BLOOD PRESSURE: 79 MMHG | SYSTOLIC BLOOD PRESSURE: 131 MMHG | OXYGEN SATURATION: 98 % | HEART RATE: 69 BPM

## 2019-01-23 VITALS
HEART RATE: 60 BPM | DIASTOLIC BLOOD PRESSURE: 60 MMHG | SYSTOLIC BLOOD PRESSURE: 140 MMHG | RESPIRATION RATE: 18 BRPM | TEMPERATURE: 98.2 F

## 2019-01-23 PROCEDURE — 3331090001 HH PPS REVENUE CREDIT

## 2019-01-23 PROCEDURE — 3331090002 HH PPS REVENUE DEBIT

## 2019-01-23 PROCEDURE — G0158 HHC OT ASSISTANT EA 15: HCPCS

## 2019-01-24 ENCOUNTER — HOME CARE VISIT (OUTPATIENT)
Dept: SCHEDULING | Facility: HOME HEALTH | Age: 84
End: 2019-01-24
Payer: MEDICARE

## 2019-01-24 VITALS
OXYGEN SATURATION: 98 % | TEMPERATURE: 98.2 F | RESPIRATION RATE: 17 BRPM | HEART RATE: 66 BPM | SYSTOLIC BLOOD PRESSURE: 140 MMHG | DIASTOLIC BLOOD PRESSURE: 89 MMHG

## 2019-01-24 PROCEDURE — 3331090002 HH PPS REVENUE DEBIT

## 2019-01-24 PROCEDURE — 3331090001 HH PPS REVENUE CREDIT

## 2019-01-24 PROCEDURE — G0158 HHC OT ASSISTANT EA 15: HCPCS

## 2019-01-24 NOTE — PROGRESS NOTES
[facility] - INPATIENT Face to Face Encounter Patients Name: Awilda Galdamez    YOB: 1934 Ordering Physician: Jessica Wise M.D., FACEP Primary Diagnosis: Closed fracture of superior ramus of right pubis, [facility]; Vertigo [R42] 
  
 
Date of Face to Face:   01/12/2019 Face to Face Encounter findings are related to primary reason for home care:   yes. 1. I certify that the patient needs intermittent care as follows: skilled nursing care:  skilled observation/assessment, patient education 
physical therapy: strengthening, transfer training, gait/stair training, balance training and pt/caregiver education 2. I certify that this patient is homebound, that is: 1) patient requires the use of a cane, walker and wheelchair device, special transportation, or assistance of another to leave the home; or 2) patient's condition makes leaving the home medically contraindicated; and 3) patient has a normal inability to leave the home and leaving the home requires considerable and taxing effort. Patient may leave the home for infrequent and short duration for medical reasons, and occasional absences for non-medical reasons. Homebound status is due to the following functional limitations: Patient with strength deficits limiting the performance of all ADL's without caregiver assistance or the use of an assistive device. Patient with poor safety awareness and is at risk for falls without assistance of another person and the use of an assistive device. Patient with poor ambulation endurance limiting their safe ability to ascend/descend the required number of steps to leave the home.  
 
3. I certify that this patient is under my care and that I, or a nurse practitioner or  [id number], or clinical nurse specialist, or certified nurse midwife, working with me, had a Face-to-Face Encounter that meets the physician Face-to-Face Encounter requirements. The following are the clinical findings from the [address] encounter that support the need for skilled services and is a summary of the encounter:  
 
See attached progess note Jayla Roberson 1/24/2019 THE FOLLOWING TO BE COMPLETED BY THE COMMUNITY PHYSICIAN: 
 
I concur with the findings described above from the F2F encounter that this patient is homebound and in need of a skilled service. Certifying Physician: _____________________________________ Printed Certifying Physician Name: _____________________________________ Date: _________________

## 2019-01-25 ENCOUNTER — HOME CARE VISIT (OUTPATIENT)
Dept: SCHEDULING | Facility: HOME HEALTH | Age: 84
End: 2019-01-25
Payer: MEDICARE

## 2019-01-25 PROCEDURE — G0151 HHCP-SERV OF PT,EA 15 MIN: HCPCS

## 2019-01-25 PROCEDURE — 3331090001 HH PPS REVENUE CREDIT

## 2019-01-25 PROCEDURE — 3331090002 HH PPS REVENUE DEBIT

## 2019-01-26 VITALS
DIASTOLIC BLOOD PRESSURE: 74 MMHG | SYSTOLIC BLOOD PRESSURE: 126 MMHG | RESPIRATION RATE: 16 BRPM | HEART RATE: 74 BPM | TEMPERATURE: 97.9 F

## 2019-01-26 PROCEDURE — 3331090001 HH PPS REVENUE CREDIT

## 2019-01-26 PROCEDURE — 3331090002 HH PPS REVENUE DEBIT

## 2019-01-27 PROCEDURE — 3331090001 HH PPS REVENUE CREDIT

## 2019-01-27 PROCEDURE — 3331090002 HH PPS REVENUE DEBIT

## 2019-01-28 ENCOUNTER — HOME CARE VISIT (OUTPATIENT)
Dept: SCHEDULING | Facility: HOME HEALTH | Age: 84
End: 2019-01-28
Payer: MEDICARE

## 2019-01-28 VITALS
DIASTOLIC BLOOD PRESSURE: 65 MMHG | OXYGEN SATURATION: 98 % | RESPIRATION RATE: 16 BRPM | HEART RATE: 68 BPM | TEMPERATURE: 98 F | SYSTOLIC BLOOD PRESSURE: 119 MMHG

## 2019-01-28 PROCEDURE — 3331090001 HH PPS REVENUE CREDIT

## 2019-01-28 PROCEDURE — 3331090002 HH PPS REVENUE DEBIT

## 2019-01-28 PROCEDURE — G0158 HHC OT ASSISTANT EA 15: HCPCS

## 2019-01-29 ENCOUNTER — HOME CARE VISIT (OUTPATIENT)
Dept: SCHEDULING | Facility: HOME HEALTH | Age: 84
End: 2019-01-29
Payer: MEDICARE

## 2019-01-29 PROCEDURE — 3331090002 HH PPS REVENUE DEBIT

## 2019-01-29 PROCEDURE — 3331090001 HH PPS REVENUE CREDIT

## 2019-01-29 PROCEDURE — G0157 HHC PT ASSISTANT EA 15: HCPCS

## 2019-01-30 VITALS
TEMPERATURE: 98.5 F | HEART RATE: 60 BPM | DIASTOLIC BLOOD PRESSURE: 62 MMHG | RESPIRATION RATE: 18 BRPM | SYSTOLIC BLOOD PRESSURE: 120 MMHG

## 2019-01-30 PROCEDURE — 3331090001 HH PPS REVENUE CREDIT

## 2019-01-30 PROCEDURE — 3331090002 HH PPS REVENUE DEBIT
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
## 2019-01-31 PROBLEM — S32.502D CLOSED NONDISPLACED FRACTURE OF LEFT PUBIS WITH ROUTINE HEALING: Status: ACTIVE | Noted: 2019-01-31

## 2019-01-31 PROCEDURE — 3331090002 HH PPS REVENUE DEBIT

## 2019-01-31 PROCEDURE — 3331090001 HH PPS REVENUE CREDIT

## 2019-02-01 ENCOUNTER — HOME CARE VISIT (OUTPATIENT)
Dept: SCHEDULING | Facility: HOME HEALTH | Age: 84
End: 2019-02-01
Payer: MEDICARE

## 2019-02-01 PROCEDURE — 3331090002 HH PPS REVENUE DEBIT

## 2019-02-01 PROCEDURE — G0158 HHC OT ASSISTANT EA 15: HCPCS

## 2019-02-01 PROCEDURE — G0157 HHC PT ASSISTANT EA 15: HCPCS

## 2019-02-01 PROCEDURE — 3331090001 HH PPS REVENUE CREDIT

## 2019-02-02 PROCEDURE — 3331090002 HH PPS REVENUE DEBIT

## 2019-02-02 PROCEDURE — 3331090001 HH PPS REVENUE CREDIT

## 2019-02-03 PROCEDURE — 3331090001 HH PPS REVENUE CREDIT

## 2019-02-03 PROCEDURE — 3331090002 HH PPS REVENUE DEBIT

## 2019-02-04 VITALS
HEART RATE: 60 BPM | DIASTOLIC BLOOD PRESSURE: 62 MMHG | SYSTOLIC BLOOD PRESSURE: 120 MMHG | RESPIRATION RATE: 18 BRPM | TEMPERATURE: 97.1 F

## 2019-02-04 PROCEDURE — 3331090001 HH PPS REVENUE CREDIT

## 2019-02-04 PROCEDURE — 3331090002 HH PPS REVENUE DEBIT

## 2019-02-05 ENCOUNTER — HOME CARE VISIT (OUTPATIENT)
Dept: SCHEDULING | Facility: HOME HEALTH | Age: 84
End: 2019-02-05
Payer: MEDICARE

## 2019-02-05 ENCOUNTER — HOME CARE VISIT (OUTPATIENT)
Dept: HOME HEALTH SERVICES | Facility: HOME HEALTH | Age: 84
End: 2019-02-05
Payer: MEDICARE

## 2019-02-05 VITALS
OXYGEN SATURATION: 97 % | TEMPERATURE: 98.4 F | RESPIRATION RATE: 16 BRPM | HEART RATE: 69 BPM | SYSTOLIC BLOOD PRESSURE: 140 MMHG | DIASTOLIC BLOOD PRESSURE: 77 MMHG

## 2019-02-05 PROCEDURE — 3331090001 HH PPS REVENUE CREDIT

## 2019-02-05 PROCEDURE — G0157 HHC PT ASSISTANT EA 15: HCPCS

## 2019-02-05 PROCEDURE — 3331090002 HH PPS REVENUE DEBIT

## 2019-02-05 PROCEDURE — G0158 HHC OT ASSISTANT EA 15: HCPCS

## 2019-02-06 ENCOUNTER — HOME CARE VISIT (OUTPATIENT)
Dept: SCHEDULING | Facility: HOME HEALTH | Age: 84
End: 2019-02-06
Payer: MEDICARE

## 2019-02-06 ENCOUNTER — HOME CARE VISIT (OUTPATIENT)
Dept: HOME HEALTH SERVICES | Facility: HOME HEALTH | Age: 84
End: 2019-02-06
Payer: MEDICARE

## 2019-02-06 VITALS
TEMPERATURE: 97.8 F | HEART RATE: 60 BPM | SYSTOLIC BLOOD PRESSURE: 120 MMHG | DIASTOLIC BLOOD PRESSURE: 60 MMHG | RESPIRATION RATE: 18 BRPM

## 2019-02-06 VITALS
HEART RATE: 64 BPM | DIASTOLIC BLOOD PRESSURE: 78 MMHG | RESPIRATION RATE: 16 BRPM | OXYGEN SATURATION: 96 % | SYSTOLIC BLOOD PRESSURE: 142 MMHG | TEMPERATURE: 98.9 F

## 2019-02-06 PROCEDURE — 3331090001 HH PPS REVENUE CREDIT

## 2019-02-06 PROCEDURE — 3331090002 HH PPS REVENUE DEBIT

## 2019-02-06 PROCEDURE — G0152 HHCP-SERV OF OT,EA 15 MIN: HCPCS

## 2019-02-07 ENCOUNTER — HOME CARE VISIT (OUTPATIENT)
Dept: SCHEDULING | Facility: HOME HEALTH | Age: 84
End: 2019-02-07
Payer: MEDICARE

## 2019-02-07 PROCEDURE — G0151 HHCP-SERV OF PT,EA 15 MIN: HCPCS

## 2019-02-07 PROCEDURE — 3331090002 HH PPS REVENUE DEBIT

## 2019-02-07 PROCEDURE — 3331090003 HH PPS REVENUE ADJ

## 2019-02-07 PROCEDURE — 3331090001 HH PPS REVENUE CREDIT

## 2019-02-08 VITALS
TEMPERATURE: 98.1 F | RESPIRATION RATE: 17 BRPM | HEART RATE: 81 BPM | SYSTOLIC BLOOD PRESSURE: 124 MMHG | DIASTOLIC BLOOD PRESSURE: 68 MMHG

## 2019-02-08 PROCEDURE — 3331090002 HH PPS REVENUE DEBIT

## 2019-02-08 PROCEDURE — 3331090001 HH PPS REVENUE CREDIT

## 2019-02-09 PROCEDURE — 3331090001 HH PPS REVENUE CREDIT

## 2019-02-09 PROCEDURE — 3331090002 HH PPS REVENUE DEBIT

## 2019-02-10 PROCEDURE — 3331090002 HH PPS REVENUE DEBIT

## 2019-02-10 PROCEDURE — 3331090001 HH PPS REVENUE CREDIT

## 2019-08-23 PROBLEM — R00.1 BRADYCARDIA: Status: ACTIVE | Noted: 2019-08-23

## 2019-08-27 ENCOUNTER — HOSPITAL ENCOUNTER (OUTPATIENT)
Dept: MAMMOGRAPHY | Age: 84
Discharge: HOME | End: 2019-08-27
Attending: INTERNAL MEDICINE
Payer: MEDICARE

## 2019-08-27 DIAGNOSIS — M85.89 OSTEOPENIA OF MULTIPLE SITES: ICD-10-CM

## 2019-08-27 PROCEDURE — 77080 DXA BONE DENSITY AXIAL: CPT

## 2019-09-16 ENCOUNTER — HOSPITAL ENCOUNTER (OUTPATIENT)
Dept: GENERAL RADIOLOGY | Age: 84
Discharge: HOME | End: 2019-09-16

## 2019-09-16 DIAGNOSIS — G89.29 CHRONIC MIDLINE LOW BACK PAIN WITHOUT SCIATICA: ICD-10-CM

## 2019-09-16 DIAGNOSIS — M54.50 CHRONIC MIDLINE LOW BACK PAIN WITHOUT SCIATICA: ICD-10-CM

## 2020-09-09 PROBLEM — D50.9 IRON DEFICIENCY ANEMIA: Status: ACTIVE | Noted: 2020-09-09

## 2021-03-29 PROBLEM — M81.0 OSTEOPOROSIS: Status: ACTIVE | Noted: 2021-03-29

## 2021-07-01 PROBLEM — E83.52 HYPERCALCEMIA: Status: ACTIVE | Noted: 2021-07-01

## 2021-08-30 ENCOUNTER — HOSPITAL ENCOUNTER (OUTPATIENT)
Dept: MAMMOGRAPHY | Age: 86
Discharge: HOME | End: 2021-08-30
Attending: INTERNAL MEDICINE
Payer: MEDICARE

## 2021-08-30 DIAGNOSIS — M81.0 AGE-RELATED OSTEOPOROSIS WITHOUT CURRENT PATHOLOGICAL FRACTURE: ICD-10-CM

## 2021-08-30 DIAGNOSIS — S32.502D CLOSED NONDISPLACED FRACTURE OF LEFT PUBIS WITH ROUTINE HEALING: ICD-10-CM

## 2021-08-30 PROCEDURE — 77080 DXA BONE DENSITY AXIAL: CPT

## 2022-01-04 ENCOUNTER — HOSPITAL ENCOUNTER (OUTPATIENT)
Dept: MRI IMAGING | Age: 87
Discharge: HOME | End: 2022-01-04
Attending: NURSE PRACTITIONER
Payer: MEDICARE

## 2022-01-04 DIAGNOSIS — M54.50 LOW BACK PAIN, UNSPECIFIED BACK PAIN LATERALITY, UNSPECIFIED CHRONICITY, UNSPECIFIED WHETHER SCIATICA PRESENT: ICD-10-CM

## 2022-01-04 DIAGNOSIS — M43.16 SPONDYLOLISTHESIS OF LUMBAR REGION: ICD-10-CM

## 2022-01-04 DIAGNOSIS — M54.16 LUMBAR RADICULOPATHY: ICD-10-CM

## 2022-01-04 DIAGNOSIS — R20.0 HAND NUMBNESS: ICD-10-CM

## 2022-01-04 DIAGNOSIS — M51.36 LUMBAR DEGENERATIVE DISC DISEASE: ICD-10-CM

## 2022-01-04 DIAGNOSIS — M48.02 CERVICAL STENOSIS OF SPINAL CANAL: ICD-10-CM

## 2022-01-04 PROCEDURE — 72141 MRI NECK SPINE W/O DYE: CPT

## 2022-01-04 PROCEDURE — 72148 MRI LUMBAR SPINE W/O DYE: CPT

## 2022-03-18 PROBLEM — E83.52 HYPERCALCEMIA: Status: ACTIVE | Noted: 2021-07-01

## 2022-03-18 PROBLEM — M81.0 OSTEOPOROSIS: Status: ACTIVE | Noted: 2021-03-29

## 2022-03-19 PROBLEM — Z95.0 PACEMAKER: Status: ACTIVE | Noted: 2017-10-10

## 2022-03-19 PROBLEM — Z95.0 HISTORY OF PACEMAKER: Status: ACTIVE | Noted: 2019-01-04

## 2022-03-19 PROBLEM — D50.9 IRON DEFICIENCY ANEMIA: Status: ACTIVE | Noted: 2020-09-09

## 2022-03-19 PROBLEM — M67.911 DISORDER OF RIGHT ROTATOR CUFF: Status: ACTIVE | Noted: 2017-03-16

## 2022-03-19 PROBLEM — R00.1 BRADYCARDIA: Status: ACTIVE | Noted: 2019-08-23

## 2022-03-19 PROBLEM — G89.29 CHRONIC MIDLINE LOW BACK PAIN WITHOUT SCIATICA: Status: ACTIVE | Noted: 2019-09-16

## 2022-03-19 PROBLEM — M54.50 CHRONIC MIDLINE LOW BACK PAIN WITHOUT SCIATICA: Status: ACTIVE | Noted: 2019-09-16
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
## 2022-03-19 PROBLEM — S32.502D CLOSED NONDISPLACED FRACTURE OF LEFT PUBIS WITH ROUTINE HEALING: Status: ACTIVE | Noted: 2019-01-31

## 2022-03-19 PROBLEM — R73.9 HYPERGLYCEMIA, UNSPECIFIED: Status: ACTIVE | Noted: 2017-03-16

## 2022-03-24 NOTE — H&P (VIEW-ONLY)
Orthopaedic Hand Surgery Note    Name: Minna Hernandez  YOB: 1934  Gender: female  MRN: [id number]    CC: New patient referred for hand numbness      HPI: Patient is a 80 y.o. female with a chief complaint of Bilateral hand numbness and tingling in the median nerve distribution. The symptoms have been going on for 6 months. The patient does complain of night wakening and increased symptoms with driving. She has numbness in the small fingers as well  Evaluation to date has included nerve conduction study. Treatment to date has included none. ROS/Meds/PSH/PMH/FH/SH: I personally reviewed the patients standard intake form. Pertinents are discussed In the HPI    Physical Examination:    Musculoskeletal:     Examination of the Bilateral upper extremity demonstrates Decreased sensation to light touch in the right median and left median and ulnar distribution, normal sensation in radial distribution, positive carpal tunnel compression testing and Phalen testing, cap refill < 5 seconds in all fingers. Inspection reveals no thenar atrophy. Positive Tinel and elbow flexion compression test of the cubital tunnel on the left only, negative Tinel over Guyon's canal. Sensation to light touch in the ulnar 2 digits is normal with no intrinsic atrophy/weakness. No tenderness to palpation or masses noted in the forearm. Imaging / Electrodiagnostic Tests:     I independently reviewed and interpreted the patient's nerve conduction study. 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 There is also evidence of cervical radiculopathy particularly on the right side with denervation of the right deltoid    Assessment:     ICD-10-CM ICD-9-CM    1.  Carpal tunnel syndrome, bilateral  G56.03 354.0 REFERRAL TO ORTHOPEDIC SURGERY   2. Cubital tunnel syndrome on left  G56.22 354.2 REFERRAL TO ORTHOPEDIC SURGERY       Plan:  We discussed the diagnosis and different treatment options. We discussed observation, EMG/NCV, night splinting, cortisone injections and surgical decompression and the risks and benefits of all were clearly outlined. After discussing in detail the patient elects to proceed with surgical treatment. 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 Patient understands risks and benefits of left carpal and cubital tunnel release including but not limited to nerve injury, vessel injury, infection, failure to achieve desired results and possible need for additional surgery. Patient understands and wishes to proceed with surgery. On Exam:   The patient is alert and oriented  Cardiovascular: regular rate and rhythm  Respiratory: Non labored breathing       Patient voiced accordance and understanding of the information provided and the formulated plan. All questions were answered to the patient's satisfaction during the encounter.     4 This is a chronic illness/condition with exacerbation and progression  Treatment at this time: Elective major surgery with procedural risk factors    Yulia Heard MD  Orthopaedic Surgery  03/24/22  3:09 PM

## 2022-03-31 ENCOUNTER — ANESTHESIA EVENT (OUTPATIENT)
Dept: SURGERY | Age: 87
End: 2022-03-31
Payer: MEDICARE

## 2022-04-01 ENCOUNTER — ANESTHESIA (OUTPATIENT)
Dept: SURGERY | Age: 87
End: 2022-04-01
Payer: MEDICARE

## 2022-04-01 ENCOUNTER — HOSPITAL ENCOUNTER (OUTPATIENT)
Age: 87
Setting detail: OUTPATIENT SURGERY
Discharge: HOME | End: 2022-04-01
Attending: ORTHOPAEDIC SURGERY | Admitting: ORTHOPAEDIC SURGERY
Payer: MEDICARE

## 2022-04-01 VITALS
SYSTOLIC BLOOD PRESSURE: 135 MMHG | WEIGHT: 164 LBS | BODY MASS INDEX: 31.5 KG/M2 | OXYGEN SATURATION: 97 % | RESPIRATION RATE: 14 BRPM | DIASTOLIC BLOOD PRESSURE: 68 MMHG | HEART RATE: 60 BPM | TEMPERATURE: 97.8 F

## 2022-04-01 DIAGNOSIS — G56.22 CUBITAL TUNNEL SYNDROME ON LEFT: ICD-10-CM

## 2022-04-01 DIAGNOSIS — G56.02 CARPAL TUNNEL SYNDROME OF LEFT WRIST: Primary | ICD-10-CM

## 2022-04-01 PROCEDURE — 76010000149 HC OR TIME 1 TO 1.5 HR: Performed by: ORTHOPAEDIC SURGERY

## 2022-04-01 PROCEDURE — 74011250636 HC RX REV CODE- 250/636: Performed by: ANESTHESIOLOGY

## 2022-04-01 PROCEDURE — 76210000006 HC OR PH I REC 0.5 TO 1 HR: Performed by: ORTHOPAEDIC SURGERY

## 2022-04-01 PROCEDURE — 77030000032 HC CUF TRNQT ZIMM -B: Performed by: ORTHOPAEDIC SURGERY

## 2022-04-01 PROCEDURE — 74011000250 HC RX REV CODE- 250: Performed by: ANESTHESIOLOGY

## 2022-04-01 PROCEDURE — 76210000020 HC REC RM PH II FIRST 0.5 HR: Performed by: ORTHOPAEDIC SURGERY

## 2022-04-01 PROCEDURE — A4565 SLINGS: HCPCS | Performed by: ORTHOPAEDIC SURGERY

## 2022-04-01 PROCEDURE — 74011250636 HC RX REV CODE- 250/636: Performed by: NURSE ANESTHETIST, CERTIFIED REGISTERED

## 2022-04-01 PROCEDURE — 77030002916 HC SUT ETHLN J&J -A: Performed by: ORTHOPAEDIC SURGERY

## 2022-04-01 PROCEDURE — 2709999900 HC NON-CHARGEABLE SUPPLY: Performed by: ORTHOPAEDIC SURGERY

## 2022-04-01 PROCEDURE — 76060000033 HC ANESTHESIA 1 TO 1.5 HR: Performed by: ORTHOPAEDIC SURGERY

## 2022-04-01 PROCEDURE — 64721 CARPAL TUNNEL SURGERY: CPT | Performed by: ORTHOPAEDIC SURGERY

## 2022-04-01 PROCEDURE — 74011000250 HC RX REV CODE- 250: Performed by: ORTHOPAEDIC SURGERY

## 2022-04-01 PROCEDURE — 15736 MUSCLE-SKIN GRAFT ARM: CPT | Performed by: ORTHOPAEDIC SURGERY

## 2022-04-01 PROCEDURE — 77030033138 HC SUT PGA STRATFX J&J -B: Performed by: ORTHOPAEDIC SURGERY

## 2022-04-01 PROCEDURE — 64718 REVISE ULNAR NERVE AT ELBOW: CPT | Performed by: ORTHOPAEDIC SURGERY

## 2022-04-01 PROCEDURE — 77030031139 HC SUT VCRL2 J&J -A: Performed by: ORTHOPAEDIC SURGERY

## 2022-04-01 PROCEDURE — 74011250636 HC RX REV CODE- 250/636: Performed by: ORTHOPAEDIC SURGERY

## 2022-04-01 RX ORDER — HALOPERIDOL 5 MG/ML
1 INJECTION INTRAMUSCULAR
Status: DISCONTINUED | OUTPATIENT
Start: 2022-04-01 | End: 2022-04-01 | Stop reason: HOSPADM

## 2022-04-01 RX ORDER — FLUMAZENIL 0.1 MG/ML
0.2 INJECTION INTRAVENOUS
Status: DISCONTINUED | OUTPATIENT
Start: 2022-04-01 | End: 2022-04-01 | Stop reason: HOSPADM

## 2022-04-01 RX ORDER — SODIUM CHLORIDE 0.9 % (FLUSH) 0.9 %
5-40 SYRINGE (ML) INJECTION EVERY 8 HOURS
Status: DISCONTINUED | OUTPATIENT
Start: 2022-04-01 | End: 2022-04-01 | Stop reason: HOSPADM

## 2022-04-01 RX ORDER — BUPIVACAINE HYDROCHLORIDE AND EPINEPHRINE 2.5; 5 MG/ML; UG/ML
INJECTION, SOLUTION EPIDURAL; INFILTRATION; INTRACAUDAL; PERINEURAL AS NEEDED
Status: DISCONTINUED | OUTPATIENT
Start: 2022-04-01 | End: 2022-04-01 | Stop reason: HOSPADM

## 2022-04-01 RX ORDER — OXYCODONE HYDROCHLORIDE 5 MG/1
5 TABLET ORAL
Status: DISCONTINUED | OUTPATIENT
Start: 2022-04-01 | End: 2022-04-01 | Stop reason: HOSPADM

## 2022-04-01 RX ORDER — CEFAZOLIN SODIUM/WATER 2 G/20 ML
2 SYRINGE (ML) INTRAVENOUS ONCE
Status: COMPLETED | OUTPATIENT
Start: 2022-04-01 | End: 2022-04-01

## 2022-04-01 RX ORDER — PROPOFOL 10 MG/ML
INJECTION, EMULSION INTRAVENOUS AS NEEDED
Status: DISCONTINUED | OUTPATIENT
Start: 2022-04-01 | End: 2022-04-01 | Stop reason: HOSPADM

## 2022-04-01 RX ORDER — LIDOCAINE HYDROCHLORIDE 5 MG/ML
INJECTION, SOLUTION INFILTRATION; INTRAVENOUS
Status: COMPLETED | OUTPATIENT
Start: 2022-04-01 | End: 2022-04-01

## 2022-04-01 RX ORDER — LIDOCAINE HYDROCHLORIDE 10 MG/ML
0.1 INJECTION INFILTRATION; PERINEURAL AS NEEDED
Status: DISCONTINUED | OUTPATIENT
Start: 2022-04-01 | End: 2022-04-01 | Stop reason: HOSPADM

## 2022-04-01 RX ORDER — SODIUM CHLORIDE, SODIUM LACTATE, POTASSIUM CHLORIDE, CALCIUM CHLORIDE 600; 310; 30; 20 MG/100ML; MG/100ML; MG/100ML; MG/100ML
100 INJECTION, SOLUTION INTRAVENOUS CONTINUOUS
Status: DISCONTINUED | OUTPATIENT
Start: 2022-04-01 | End: 2022-04-01 | Stop reason: HOSPADM

## 2022-04-01 RX ORDER — NALOXONE HYDROCHLORIDE 0.4 MG/ML
0.1 INJECTION, SOLUTION INTRAMUSCULAR; INTRAVENOUS; SUBCUTANEOUS
Status: DISCONTINUED | OUTPATIENT
Start: 2022-04-01 | End: 2022-04-01 | Stop reason: HOSPADM

## 2022-04-01 RX ORDER — PROPOFOL 10 MG/ML
INJECTION, EMULSION INTRAVENOUS
Status: DISCONTINUED | OUTPATIENT
Start: 2022-04-01 | End: 2022-04-01 | Stop reason: HOSPADM

## 2022-04-01 RX ORDER — DIPHENHYDRAMINE HYDROCHLORIDE 50 MG/ML
12.5 INJECTION, SOLUTION INTRAMUSCULAR; INTRAVENOUS
Status: DISCONTINUED | OUTPATIENT
Start: 2022-04-01 | End: 2022-04-01 | Stop reason: HOSPADM

## 2022-04-01 RX ORDER — ONDANSETRON 4 MG/1
4 TABLET, ORALLY DISINTEGRATING ORAL
Qty: 28 TABLET | Refills: 0 | Status: SHIPPED | OUTPATIENT
Start: 2022-04-01

## 2022-04-01 RX ORDER — SODIUM CHLORIDE, SODIUM LACTATE, POTASSIUM CHLORIDE, CALCIUM CHLORIDE 600; 310; 30; 20 MG/100ML; MG/100ML; MG/100ML; MG/100ML
75 INJECTION, SOLUTION INTRAVENOUS CONTINUOUS
Status: DISCONTINUED | OUTPATIENT
Start: 2022-04-01 | End: 2022-04-01 | Stop reason: HOSPADM

## 2022-04-01 RX ORDER — HYDROCODONE BITARTRATE AND ACETAMINOPHEN 5; 325 MG/1; MG/1
1 TABLET ORAL
Qty: 15 TABLET | Refills: 0 | Status: SHIPPED | OUTPATIENT
Start: 2022-04-01 | End: 2022-04-06

## 2022-04-01 RX ORDER — SODIUM CHLORIDE 0.9 % (FLUSH) 0.9 %
5-40 SYRINGE (ML) INJECTION AS NEEDED
Status: DISCONTINUED | OUTPATIENT
Start: 2022-04-01 | End: 2022-04-01 | Stop reason: HOSPADM

## 2022-04-01 RX ORDER — HYDROMORPHONE HYDROCHLORIDE 2 MG/ML
0.5 INJECTION, SOLUTION INTRAMUSCULAR; INTRAVENOUS; SUBCUTANEOUS
Status: DISCONTINUED | OUTPATIENT
Start: 2022-04-01 | End: 2022-04-01 | Stop reason: HOSPADM

## 2022-04-01 RX ADMIN — PROPOFOL 30 MG: 10 INJECTION, EMULSION INTRAVENOUS at 08:25

## 2022-04-01 RX ADMIN — PROPOFOL 140 MCG/KG/MIN: 10 INJECTION, EMULSION INTRAVENOUS at 08:20

## 2022-04-01 RX ADMIN — Medication 2 G: at 08:19

## 2022-04-01 RX ADMIN — LIDOCAINE HYDROCHLORIDE 40 ML: 5 INJECTION, SOLUTION INFILTRATION at 08:23

## 2022-04-01 RX ADMIN — SODIUM CHLORIDE, SODIUM LACTATE, POTASSIUM CHLORIDE, AND CALCIUM CHLORIDE 100 ML/HR: 600; 310; 30; 20 INJECTION, SOLUTION INTRAVENOUS at 07:40

## 2022-04-01 NOTE — INTERVAL H&P NOTE
H&P Update:  Shelbi Santana was seen and examined. History and physical has been reviewed. The patient has been examined. There have been no significant clinical changes since the completion of the originally dated History and Physical.  Informed consent was obtained today.     Yulia Heard MD  Orthopaedic Surgery  04/01/22  7:15 AM

## 2022-04-01 NOTE — PERIOP NOTES
Discharge instructions reviewed with pt and pt's daughter at bedside. No questions or concerns at this time. Rxs escribed.

## 2022-04-01 NOTE — OP NOTES
Hand Surgery Operative Note      Sumi Stone   80 y.o.   female      Pre-op diagnosis: Left  Left Carpal Tunnel syndrome and Cubital Tunnel Syndrome  Post op diagnosis: same      Procedure: Left  Left Carpal Tunnel and Cubital  Tunnel release, subcutaneous ulnar nerve transposition with Adipofascial flap, (cpt [id number], 42923, 44620)     Surgeon: Markus Chen MD     Anesthesia: Regional block + MAC      Tourniquet time:   Total Tourniquet Time Documented:  Upper Arm (Left) - 34 minutes  Total: Upper Arm (Left) - 34 minutes        Procedure indications: Patient with radial and ulnar digit numbness recalcitrant to conservative measures with positive documentation of NCV findings consistent with carpal tunnel syndrome as well as symptoms and/or NCS findings of cubital tunnel syndrome. After Thorough discussion, the patient decided to proceed with surgical management. We discussed in detail surgical risks including scar, pain, bleeding, infection, anesthetic risks, neurovascular injury, need for further surgery,  weakness, stiffness, risk of death and potential risk of other unforseen complication. Procedure description:      Patient was placed in the supine position and after appropriate time-out and side, site and procedure confirmed. The carpal tunnel incision incision was made in the palm in line with the radial border of the ring finger under loupe magnification and palmar fascia was incised longitudinally. Blunt retraction used to identify the transverse carpal ligament, which was incised, visualizing the median nerve beneath, which was protected with a slotted freer. The remaining ligament was released with a Elim IRA meniscal blade under direct visualization. The ligament was released in its entirety, and visualized at its most proximal and distal extent.      The cubital tunnel incision was made in the medial border of the elbow adjacent to the medial epicondyle under loupe magnification, blunt dissection was performed with care to preserve superficial sensory branches, cubital tunnel fascia was incised longitudinally. Blunt retraction used to identify the ulnar nerve. Feliz ligament and fascia were released in its entirety, the nerve was visualized at its most proximal (along the medial intermuscular septum) and distal extent of the cubital tunnel (under the two heads of the FCU). The medial intermuscular septum was inspected and was released from the humeral insertion after it was deemed to cause ulnar nerve compression as well. The elbow was taken through a range of motion and the nerve was dislocating. The decision was made to transpose the nerve. The nerve was freed proximally and distally to avoid kinking, it was transposed under an adipo-fascial sleeve at the level of the flexor-pronator mass which was created from the two layers of the overlying subcutanous fat. The volar skin flap was tacked to the medial epicondyle with 3-0 vicryl sutures to avoid nerve redisplacement dorsally. Wounds were irrigated, tourniquet released and hemostasis was obtained with bipolar cautery. Skin edges were infiltrated with . 25% Bupivacaine. Carpal tunnel wound was then closed with 4-0 nylon and sterile dressing applied. The cubital tunnel wound was closed with 3-0 vicryl and running 4-0 Stratafix and sterile dressing applied. Disposition: To PACU with no complications and follow up per routine. Patient is instructed to keep dressing on until follow up and other precautions include avoidance of heavy and repetitive lifting for 2 weeks, when an appointment for follow up and suture removal will take place.      Emelie Oppenheim, MD  04/01/22  9:19 AM

## 2022-04-01 NOTE — DISCHARGE INSTRUCTIONS
Hand Surgery Postoperative  Instructions:      Weightbearing or Lifting:  Limit  weight  lifting  to  less  than  1  pound  (coffee  mug)  for  the  first  2  weeks  after  surgery. Dressing  instructions:    Keep  your  dressing  and/or  splint  clean  and  dry  at  all  times. You  can  remove  your  dressing  on  post-operative  day  #7  and  change  with  a  dry/sterile  dressing  or  Band-Aids  as  needed  thereafter. Showering  Instructions:  May  shower  But keep surgical dressing clean and dry until removed as explained above. After dressing is removed, you may allow soapy water to run through the incision during showers but do not scrub. After each shower, pat dry and apply a dry dressing. Do  not  soak  your  Incision in still water or bathtub  for  3  weeks  after  surgery. If  the  incision  gets  wet otherwise,  pat  dry  and  do  not  scrub  the  incision. Do  not  apply  cream  or  lotion  to  incision      Pain  Control:  - You  have  been  given  a  prescription  to  be  taken  as  directed  for  post-operative  pain  control. In  addition,  elevate  the  operative  extremity  above  the  heart  at  all  times  to  prevent  swelling  and  throbbing  pain. - If you develop constipation while taking narcotic pain medications (Norco, Hydrocodone, Percocet, Oxycodone, Dilaudid, Hydromorphone) take  over-the-counter  Colace,  100mg  by  mouth  twice  a  Day. - Nausea  is  a  common  side  effect  of  many  pain  medications. You  will  want  to  eat something  before  taking  your  pain  medicine  to  help  prevent  Nausea. - If  you  are  taking  a  prescription  pain  medication  that  contains  acetaminophen,  we  recommend  that  you  do  not  take  additional  over  the  counter  acetaminophen  (Tylenol®).       Other  pain  relieving  options:   - Using  a  cold  pack  to  ice  the  affected  area  a  few  times  a  day  (15  to  20  minutes  at  a  time)  can help  to  relieve  pain,  reduce  swelling  and  bruising.      - Elevation  of  the  affected  area  can  also  help  to  reduce  pain  and  swelling. Did  you  receive  a  nerve  Block? A  nerve  block  can  provide  pain  relief  for  one  hour  to  two  days  after  your  surgery. As  long  as  the  nerve  block  is  working,  you  will  experience  little  or  no  sensation  in  the  area  the  surgeon  operated  on. As  the  nerve  block  wears  off,  you  will  begin  to  experience  pain  or  discomfort. It  is  very  important  that  you  begin  taking  your  prescribed  pain  medication  before  the  nerve  block  fully  wears  off. The first sign that the nerve block is wearing off is tingling in your fingers. Treating  your  pain  at  the  first  sign  of  the  block  wearing  off  will  ensure  your  pain  is  better  controlled  and  more  tolerable  when  full-sensation  returns. Do  not  wait  until  the  pain  is  intolerable,  as  the  medicine  will  be  less  effective. It  is  better  to  treat  pain  in  advance  than  to  try  and  catch  up. General  Anesthesia or Sedation:      If  you  did  not  receive  a  nerve  block  during  your  surgery,  you  will  need  to  start  taking  your  pain  medication  shortly  after  your  surgery  and  should  continue  to  do  so  as  prescribed  by  your  surgeon. Please  call  [phone]  with any concern and ask to speak with Lilliana Damian. Concerning problems include:      -  Excessive  redness  of  the  incisions      -  Drainage  for  more  than  2  Days after surgery or any foul smelling drainage  -  Fever  of  more  than  101.5  F      Please  call  [phone]  if  you  do  not  receive  or  are  unsure  of  your  first  follow-up  appointment. You  should  see  the  doctor  10-14  days  after  your  Surgery. Thank you for choosing me and [address] [place] [address] for your care.  I will go above and beyond to ensure you receive the best care possible. Franky Moreira MD    MEDICATION INTERACTION:  During your procedure you potentially received a medication or medications which may reduce the effectiveness of oral contraceptives. Please consider other forms of contraception for 1 month following your procedure if you are currently using oral contraceptives as your primary form of birth control. In addition to this, we recommend continuing your oral contraceptive as prescribed, unless otherwise instructed by your physician, during this time    After general anesthesia or intravenous sedation, for 24 hours or while taking prescription Narcotics:  · Limit your activities  · A responsible adult needs to be with you for the next 24 hours  · Do not drive and operate hazardous machinery  · Do not make important personal or business decisions  · Do not drink alcoholic beverages  · If you have not urinated within 8 hours after discharge, and you are experiencing discomfort from urinary retention, please go to the nearest ED. · If you have sleep apnea and have a CPAP machine, please use it for all naps and sleeping. · Please use caution when taking narcotics and any of your home medications that may cause drowsiness. *  Please give a list of your current medications to your Primary Care Provider. *  Please update this list whenever your medications are discontinued, doses are      changed, or new medications (including over-the-counter products) are added. *  Please carry medication information at all times in case of emergency situations. These are general instructions for a healthy lifestyle:  No smoking/ No tobacco products/ Avoid exposure to second hand smoke  Surgeon General's Warning:  Quitting smoking now greatly reduces serious risk to your health.   Obesity, smoking, and sedentary lifestyle greatly increases your risk for illness  A healthy diet, regular physical exercise & weight monitoring are important for maintaining a healthy lifestyle    You may be retaining fluid if you have a history of heart failure or if you experience any of the following symptoms:  Weight gain of 3 pounds or more overnight or 5 pounds in a week, increased swelling in our hands or feet or shortness of breath while lying flat in bed. Please call your doctor as soon as you notice any of these symptoms; do not wait until your next office visit.

## 2022-04-01 NOTE — ANESTHESIA PROCEDURE NOTES
Peripheral Block    Start time: 4/1/2022 8:20 AM  End time: 4/1/2022 8:23 AM  Performed by: Jazmine Hussein CRNA  Authorized by: Tylor Hernández MD       Pre-procedure: Indications: at surgeon's request and primary anesthetic    Preanesthetic Checklist: patient identified, risks and benefits discussed, site marked, timeout performed, anesthesia consent given and patient being monitored    Timeout Time: 08:20 EDT          Block Type:   Block Type:  Risa block  Laterality:  Left  Medication Injected:  Lidocaine (PF) (XYLOCAINE) 5 mg/mL (0.5 %) injection, 40 mL  Med Admin Time: 4/1/2022 8:23 AM  Block Limb IV Checked: Yes    Esmarch exsanguination: Yes    Tourniquet Type:  Single  Tourniquet Location:  Above elbow  Tourniquet Inflated:  4/1/2022 8:23 AM  Inflation (mmHg):  300  Limb w/o Radial Pulse:  Yes      Assessment:    Injection Assessment:   Patient tolerance:  Patient tolerated the procedure well with no immediate complications

## 2022-04-04 NOTE — ANESTHESIA POSTPROCEDURE EVALUATION
Procedure(s):  HAND CARPAL TUNNEL RELEASE LEFT  ELBOW CUBITAL TUNNEL RELEASE LEFT .    total IV anesthesia, regional    Anesthesia Post Evaluation      Multimodal analgesia: multimodal analgesia used between 6 hours prior to anesthesia start to PACU discharge  Patient location during evaluation: bedside  Patient participation: complete - patient participated  Level of consciousness: awake  Pain management: adequate  Airway patency: patent  Anesthetic complications: no  Cardiovascular status: acceptable  Respiratory status: spontaneous ventilation and acceptable  Hydration status: acceptable  Post anesthesia nausea and vomiting:  none      INITIAL Post-op Vital signs:   Vitals Value Taken Time   /68 04/01/22 0951   Temp 36.6 °C (97.8 °F) 04/01/22 0951   Pulse 60 04/01/22 0951   Resp 14 04/01/22 0951   SpO2 97 % 04/01/22 0951

## 2022-05-19 ENCOUNTER — ANESTHESIA EVENT (OUTPATIENT)
Dept: SURGERY | Age: 87
End: 2022-05-19
Payer: MEDICARE

## 2022-05-20 ENCOUNTER — ANESTHESIA (OUTPATIENT)
Dept: SURGERY | Age: 87
End: 2022-05-20
Payer: MEDICARE

## 2022-05-20 ENCOUNTER — HOSPITAL ENCOUNTER (OUTPATIENT)
Age: 87
Setting detail: OUTPATIENT SURGERY
Discharge: HOME | End: 2022-05-20
Attending: ORTHOPAEDIC SURGERY | Admitting: ORTHOPAEDIC SURGERY
Payer: MEDICARE

## 2022-05-20 VITALS
BODY MASS INDEX: 31.04 KG/M2 | TEMPERATURE: 98 F | HEART RATE: 61 BPM | HEIGHT: 61 IN | SYSTOLIC BLOOD PRESSURE: 142 MMHG | RESPIRATION RATE: 18 BRPM | DIASTOLIC BLOOD PRESSURE: 64 MMHG | WEIGHT: 164.4 LBS | OXYGEN SATURATION: 95 %

## 2022-05-20 DIAGNOSIS — G56.03 BILATERAL CARPAL TUNNEL SYNDROME: Primary | ICD-10-CM

## 2022-05-20 PROCEDURE — 74011250636 HC RX REV CODE- 250/636: Performed by: STUDENT IN AN ORGANIZED HEALTH CARE EDUCATION/TRAINING PROGRAM

## 2022-05-20 PROCEDURE — 77030000032 HC CUF TRNQT ZIMM -B: Performed by: ORTHOPAEDIC SURGERY

## 2022-05-20 PROCEDURE — 74011250636 HC RX REV CODE- 250/636: Performed by: NURSE ANESTHETIST, CERTIFIED REGISTERED

## 2022-05-20 PROCEDURE — 64721 CARPAL TUNNEL SURGERY: CPT | Performed by: ORTHOPAEDIC SURGERY

## 2022-05-20 PROCEDURE — 76210000063 HC OR PH I REC FIRST 0.5 HR: Performed by: ORTHOPAEDIC SURGERY

## 2022-05-20 PROCEDURE — 76060000031 HC ANESTHESIA FIRST 0.5 HR: Performed by: ORTHOPAEDIC SURGERY

## 2022-05-20 PROCEDURE — 74011000250 HC RX REV CODE- 250: Performed by: ORTHOPAEDIC SURGERY

## 2022-05-20 PROCEDURE — 74011000250 HC RX REV CODE- 250: Performed by: NURSE ANESTHETIST, CERTIFIED REGISTERED

## 2022-05-20 PROCEDURE — 76010000154 HC OR TIME FIRST 0.5 HR: Performed by: ORTHOPAEDIC SURGERY

## 2022-05-20 PROCEDURE — 76210000020 HC REC RM PH II FIRST 0.5 HR: Performed by: ORTHOPAEDIC SURGERY

## 2022-05-20 PROCEDURE — 2709999900 HC NON-CHARGEABLE SUPPLY: Performed by: ORTHOPAEDIC SURGERY

## 2022-05-20 RX ORDER — HYDROCODONE BITARTRATE AND ACETAMINOPHEN 5; 325 MG/1; MG/1
1 TABLET ORAL
Qty: 10 TABLET | Refills: 0 | Status: SHIPPED | OUTPATIENT
Start: 2022-05-20 | End: 2022-05-23

## 2022-05-20 RX ORDER — SODIUM CHLORIDE, SODIUM LACTATE, POTASSIUM CHLORIDE, CALCIUM CHLORIDE 600; 310; 30; 20 MG/100ML; MG/100ML; MG/100ML; MG/100ML
100 INJECTION, SOLUTION INTRAVENOUS CONTINUOUS
Status: DISCONTINUED | OUTPATIENT
Start: 2022-05-20 | End: 2022-05-20 | Stop reason: HOSPADM

## 2022-05-20 RX ORDER — SODIUM CHLORIDE, SODIUM LACTATE, POTASSIUM CHLORIDE, CALCIUM CHLORIDE 600; 310; 30; 20 MG/100ML; MG/100ML; MG/100ML; MG/100ML
INJECTION, SOLUTION INTRAVENOUS
Status: DISCONTINUED | OUTPATIENT
Start: 2022-05-20 | End: 2022-05-20 | Stop reason: HOSPADM

## 2022-05-20 RX ORDER — NALOXONE HYDROCHLORIDE 0.4 MG/ML
0.1 INJECTION, SOLUTION INTRAMUSCULAR; INTRAVENOUS; SUBCUTANEOUS AS NEEDED
Status: DISCONTINUED | OUTPATIENT
Start: 2022-05-20 | End: 2022-05-20 | Stop reason: HOSPADM

## 2022-05-20 RX ORDER — DIPHENHYDRAMINE HYDROCHLORIDE 50 MG/ML
12.5 INJECTION, SOLUTION INTRAMUSCULAR; INTRAVENOUS
Status: DISCONTINUED | OUTPATIENT
Start: 2022-05-20 | End: 2022-05-20 | Stop reason: HOSPADM

## 2022-05-20 RX ORDER — SODIUM CHLORIDE 0.9 % (FLUSH) 0.9 %
5-40 SYRINGE (ML) INJECTION EVERY 8 HOURS
Status: DISCONTINUED | OUTPATIENT
Start: 2022-05-20 | End: 2022-05-20 | Stop reason: HOSPADM

## 2022-05-20 RX ORDER — MIDAZOLAM HYDROCHLORIDE 1 MG/ML
2 INJECTION, SOLUTION INTRAMUSCULAR; INTRAVENOUS
Status: DISCONTINUED | OUTPATIENT
Start: 2022-05-20 | End: 2022-05-20 | Stop reason: HOSPADM

## 2022-05-20 RX ORDER — FLUMAZENIL 0.1 MG/ML
0.2 INJECTION INTRAVENOUS
Status: DISCONTINUED | OUTPATIENT
Start: 2022-05-20 | End: 2022-05-20 | Stop reason: HOSPADM

## 2022-05-20 RX ORDER — PROPOFOL 10 MG/ML
INJECTION, EMULSION INTRAVENOUS AS NEEDED
Status: DISCONTINUED | OUTPATIENT
Start: 2022-05-20 | End: 2022-05-20 | Stop reason: HOSPADM

## 2022-05-20 RX ORDER — LIDOCAINE HYDROCHLORIDE 10 MG/ML
INJECTION INFILTRATION; PERINEURAL AS NEEDED
Status: DISCONTINUED | OUTPATIENT
Start: 2022-05-20 | End: 2022-05-20 | Stop reason: HOSPADM

## 2022-05-20 RX ORDER — LIDOCAINE HYDROCHLORIDE 20 MG/ML
INJECTION, SOLUTION EPIDURAL; INFILTRATION; INTRACAUDAL; PERINEURAL AS NEEDED
Status: DISCONTINUED | OUTPATIENT
Start: 2022-05-20 | End: 2022-05-20 | Stop reason: HOSPADM

## 2022-05-20 RX ORDER — HYDROMORPHONE HYDROCHLORIDE 2 MG/ML
0.5 INJECTION, SOLUTION INTRAMUSCULAR; INTRAVENOUS; SUBCUTANEOUS
Status: DISCONTINUED | OUTPATIENT
Start: 2022-05-20 | End: 2022-05-20 | Stop reason: HOSPADM

## 2022-05-20 RX ORDER — SODIUM CHLORIDE 0.9 % (FLUSH) 0.9 %
5-40 SYRINGE (ML) INJECTION AS NEEDED
Status: DISCONTINUED | OUTPATIENT
Start: 2022-05-20 | End: 2022-05-20 | Stop reason: HOSPADM

## 2022-05-20 RX ORDER — PROPOFOL 10 MG/ML
INJECTION, EMULSION INTRAVENOUS
Status: DISCONTINUED | OUTPATIENT
Start: 2022-05-20 | End: 2022-05-20 | Stop reason: HOSPADM

## 2022-05-20 RX ORDER — LIDOCAINE HYDROCHLORIDE 10 MG/ML
0.1 INJECTION INFILTRATION; PERINEURAL AS NEEDED
Status: DISCONTINUED | OUTPATIENT
Start: 2022-05-20 | End: 2022-05-20 | Stop reason: HOSPADM

## 2022-05-20 RX ORDER — CEFAZOLIN SODIUM/WATER 2 G/20 ML
2 SYRINGE (ML) INTRAVENOUS ONCE
Status: DISCONTINUED | OUTPATIENT
Start: 2022-05-20 | End: 2022-05-20 | Stop reason: HOSPADM

## 2022-05-20 RX ORDER — BUPIVACAINE HYDROCHLORIDE 2.5 MG/ML
INJECTION, SOLUTION EPIDURAL; INFILTRATION; INTRACAUDAL AS NEEDED
Status: DISCONTINUED | OUTPATIENT
Start: 2022-05-20 | End: 2022-05-20 | Stop reason: HOSPADM

## 2022-05-20 RX ADMIN — PROPOFOL 25 MCG/KG/MIN: 10 INJECTION, EMULSION INTRAVENOUS at 08:12

## 2022-05-20 RX ADMIN — SODIUM CHLORIDE, SODIUM LACTATE, POTASSIUM CHLORIDE, AND CALCIUM CHLORIDE: 600; 310; 30; 20 INJECTION, SOLUTION INTRAVENOUS at 08:01

## 2022-05-20 RX ADMIN — PROPOFOL 40 MG: 10 INJECTION, EMULSION INTRAVENOUS at 08:06

## 2022-05-20 RX ADMIN — LIDOCAINE HYDROCHLORIDE 20 MG: 20 INJECTION, SOLUTION EPIDURAL; INFILTRATION; INTRACAUDAL; PERINEURAL at 08:06

## 2022-05-20 RX ADMIN — SODIUM CHLORIDE, POTASSIUM CHLORIDE, SODIUM LACTATE AND CALCIUM CHLORIDE 100 ML/HR: 600; 310; 30; 20 INJECTION, SOLUTION INTRAVENOUS at 06:45

## 2022-05-20 NOTE — H&P
History and Physical           Name: Amos Hernandez  YOB: 1934  Gender: female  MRN: [id number]     HPI: Patient is status post Hand Carpal Tunnel Release Left - Left and Elbow Cubital Tunnel Release Left  - Left on 4/1/2022. Patient reports sensation is improved. Night symptoms have resolved. No fevers or chills. She is happy with the improvements that she has had and is ready to schedule surgery for the right     Physical Examination:  On Exam:   The patient is alert and oriented  Cardiovascular: regular rate and rhythm  Respiratory: Non labored breathing    Left upper extremity: Wounds healing well. Good finger, wrist, elbow range of motion. Sensation is improved from preoperative.     Examination of the right upper extremity demonstrates Decreased sensation to light touch in the right median and left median and ulnar distribution, normal sensation in radial distribution, positive carpal tunnel compression testing and Phalen testing, cap refill < 5 seconds in all fingers     Assessment:   1. Cubital tunnel syndrome on left    2. Carpal tunnel syndrome, bilateral          Status post Hand Carpal Tunnel Release Left - Left and Elbow Cubital Tunnel Release Left  - Left on 4/1/2022     Plan:  We discussed that the hand can get wet uncovered in the shower but no soaking the hand for 2 more weeks. After washing the hand the incision needs to be patted dry and covered. Lifting will be kept at 2 lbs and under with avoidance of undue stress on the hand for 2 weeks. She is ready to schedule surgery for the right carpal tunnel syndrome.     Patient understands risks and benefits of right carpal tunnel release including but not limited to nerve injury, vessel injury, infection, failure to achieve desired results and possible need for additional surgery.  Patient understands and wishes to proceed with surgery.              Brandon Funez MD  05/20/22   7:14 AM

## 2022-05-20 NOTE — OP NOTES
Hand Surgery Operative Note      Bert Stone   80 y.o.   female      Pre-op diagnosis: Right Carpal Tunnel syndrome  Post op diagnosis: same      Procedure: Right Carpal Tunnel release cpt 38718      Surgeon: Girma Rosenbaum MD     Anesthesia: MAC+ Local     Tourniquet time:   Total Tourniquet Time Documented:  Forearm (Right) - 2 minutes  Total: Forearm (Right) - 2 minutes        Procedure indications: Patient with radial digit numbness recalcitrant to conservative measures with positive documentation of NCV findings consistent with carpal tunnel syndrome. After Thorough discussion, the patient decided to proceed with surgical management. We discussed in detail surgical risks including scar, pain, bleeding, infection, anesthetic risks, neurovascular injury, need for further surgery,  weakness, stiffness, risk of death and potential risk of other unforseen complication. Procedure description:      Patient was placed in the supine position and after appropriate time-out and side, site and procedure confirmed. The incision was made in the palm in line with the radial border of the ring finger under loupe magnification and palmar fascia was incised longitudinally. Blunt retraction used to identify the transverse carpal ligament, which was incised, visualizing the median nerve beneath, which was protected with a slotted freer. The remaining ligament was released with a 15 blade under direct visualization. The ligament was released in its entirety, and visualized at its most proximal and distal extent. Wound was irrigated, tourniquet released and hemostasis was obtained with bipolar cautery. Skin edges were infiltrated with . 25% Bupivacaine. Wound then closed with 4-0 nylon and sterile dressing applied. Disposition: To PACU with no complications and follow up per routine.   Patient is instructed to remove dressings in five days and other precautions include avoidance of heavy and repetitive lifting for 2 weeks, when an appointment for follow up and suture removal will take place.      Izzy Patton MD  05/20/22  8:20 AM

## 2022-05-20 NOTE — PERIOP NOTES
PACU DISCHARGE NOTE    Patient's daughter voiced understanding of discharge instructions. Vital signs stable, pain well controlled, alert and oriented times three or at baseline, follow up per surgeon, no anesthetic complications.

## 2022-05-20 NOTE — DISCHARGE INSTRUCTIONS
Hand Surgery Postoperative  Instructions:      Weightbearing or Lifting:  Limit  weight  lifting  to  less  than  1  pound  (coffee  mug)  for  the  first  2  weeks  after  surgery. Dressing  instructions:    Keep  your  dressing  and/or  splint  clean  and  dry  at  all  times. You  can  remove  your  dressing  on  post-operative  day  #7  and  change  with  a  dry/sterile  dressing  or  Band-Aids  as  needed  thereafter. Showering  Instructions:  May  shower  But keep surgical dressing clean and dry until removed as explained above. After dressing is removed, you may allow soapy water to run through the incision during showers but do not scrub. After each shower, pat dry and apply a dry dressing. Do  not  soak  your  Incision in still water or bathtub  for  3  weeks  after  surgery. If  the  incision  gets  wet otherwise,  pat  dry  and  do  not  scrub  the  incision. Do  not  apply  cream  or  lotion  to  incision      Pain  Control:  - You  have  been  given  a  prescription  to  be  taken  as  directed  for  post-operative  pain  control. In  addition,  elevate  the  operative  extremity  above  the  heart  at  all  times  to  prevent  swelling  and  throbbing  pain. - If you develop constipation while taking narcotic pain medications (Norco, Hydrocodone, Percocet, Oxycodone, Dilaudid, Hydromorphone) take  over-the-counter  Colace,  100mg  by  mouth  twice  a  Day. - Nausea  is  a  common  side  effect  of  many  pain  medications. You  will  want  to  eat something  before  taking  your  pain  medicine  to  help  prevent  Nausea. - If  you  are  taking  a  prescription  pain  medication  that  contains  acetaminophen,  we  recommend  that  you  do  not  take  additional  over  the  counter  acetaminophen  (Tylenol®).       Other  pain  relieving  options:   - Using  a  cold  pack  to  ice  the  affected  area  a  few  times  a  day  (15  to  20  minutes  at  a  time)  can help  to  relieve  pain,  reduce  swelling  and  bruising.      - Elevation  of  the  affected  area  can  also  help  to  reduce  pain  and  swelling. Did  you  receive  a  nerve  Block? A  nerve  block  can  provide  pain  relief  for  one  hour  to  two  days  after  your  surgery. As  long  as  the  nerve  block  is  working,  you  will  experience  little  or  no  sensation  in  the  area  the  surgeon  operated  on. As  the  nerve  block  wears  off,  you  will  begin  to  experience  pain  or  discomfort. It  is  very  important  that  you  begin  taking  your  prescribed  pain  medication  before  the  nerve  block  fully  wears  off. The first sign that the nerve block is wearing off is tingling in your fingers. Treating  your  pain  at  the  first  sign  of  the  block  wearing  off  will  ensure  your  pain  is  better  controlled  and  more  tolerable  when  full-sensation  returns. Do  not  wait  until  the  pain  is  intolerable,  as  the  medicine  will  be  less  effective. It  is  better  to  treat  pain  in  advance  than  to  try  and  catch  up. General  Anesthesia or Sedation:      If  you  did  not  receive  a  nerve  block  during  your  surgery,  you  will  need  to  start  taking  your  pain  medication  shortly  after  your  surgery  and  should  continue  to  do  so  as  prescribed  by  your  surgeon. Please  call  [phone]  with any concern and ask to speak with Yeny Ortega. Concerning problems include:      -  Excessive  redness  of  the  incisions      -  Drainage  for  more  than  2  Days after surgery or any foul smelling drainage  -  Fever  of  more  than  101.5  F      Please  call  [phone]  if  you  do  not  receive  or  are  unsure  of  your  first  follow-up  appointment. You  should  see  the  doctor  10-14  days  after  your  Surgery. Thank you for choosing me and [address] [place] [address] for your care.  I will go above and beyond to ensure you receive the best care possible. Jessica Padgett MD      CALL Markus Reynolds IF     Call your doctor if pain is NOT relieved by medication.   Excessive bleeding that does not stop after holding pressure over the area  · Temperature of 101 degrees F or above  · Excessive redness, swelling or bruising, and/ or green or yellow, smelly discharge from incision      TYPICAL SIDE EFFECTS OF PAIN MEDICATION:     Constipation: Drink lots of fluids. Over the counter stool softener if needed.    Nausea: Take pain medication with food. Call your doctor with persistent nausea. ACTIVITY  · As tolerated and as directed by your doctor. · Bathe or shower as directed by your doctor. DIET  · Day of surgery: Clear liquids until no nausea or vomiting; small portion, light diet [place] foods (ex: baked chicken, plain rice, grits, scrambled eggs, toast). Nothing greasy, fried or spicy today. · Advance to regular diet on second day, unless your doctor orders otherwise. · If nausea and vomiting continues, call your doctor. PAIN  · Take pain medication as directed by your doctor. · DO NOT take aspirin or blood thinners unless directed by your doctor. AFTER ANESTHESIA   · For the first 24 hours: DO NOT Drive, Drink alcoholic beverages, or Make important decisions. · Be aware of dizziness following anesthesia and while taking pain medication. CONTRACEPTIVES  During your procedure you potentially received medication(s) which may reduce the effectiveness of contraceptives. Please consider other forms of contraception for 1 month following your procedure if you are currently using contraceptives as your primary form of birth control. In addition to this, it is recommended you continue your contraceptive as prescribed, unless otherwise instructed by your physician.         PATIENT INSTRUCTIONS:    After general anesthesia or intravenous sedation, for 24 hours or while taking prescription Narcotics:  · Limit your activities  · Do not drive and operate hazardous machinery  · Do not make important personal or business decisions  · Do  not drink alcoholic beverages  · If you have not urinated within 8 hours after discharge, please contact your surgeon on call. *  Please give a list of your current medications to your Primary Care Provider. *  Please update this list whenever your medications are discontinued, doses are      changed, or new medications (including over-the-counter products) are added. *  Please carry medication information at all times in case of emergency situations. Preventing Infection at Home  We care about preventing infection and avoiding the spread of germs - not only when you are in the hospital but also when you return home. When you return home from the hospital, its important to take the following steps to help prevent infection and avoid spreading germs that could infect you and others. Ask everyone in your home to follow these guidelines, too. Clean Your Hands  · Clean your hands whenever your hands are visibly dirty, before you eat, before or after touching your mouth, nose or eyes, and before preparing food. Clean them after contact with body fluids, using the restroom, touching animals or changing diapers. · When washing hands, wet them with warm water and work up a lather. Rub hands for at least 15 seconds, then rinse them and pat them dry with a clean towel or paper towel. · When using hand sanitizers, it should take about 15 seconds to rub your hands dry. If not, you probably didnt apply enough . Cover Your Sneeze or Cough  Germs are released into the air whenever you sneeze or cough. To prevent the spread of infection:  · Turn away from other people before coughing or sneezing. · Cover your mouth or nose with a tissue when you cough or sneeze. Put the tissue in the trash.   · If you dont have a tissue, cough or sneeze into your upper sleeve, not your hands. · Always clean your hands after coughing or sneezing. Care for Wounds  Your skin is your bodys first line of defense against germs, but an open wound leaves an easy way for germs to enter your body. To prevent infection:  · Clean your hands before and after changing wound dressings, and wear gloves to change dressings if recommended by your doctor. · Take special care with IV lines or other devices inserted into the body. If you must touch them, clean your hands first.  · Follow any specific instructions from your doctor to care for your wounds. Contact your doctor if you experience any signs of infection, such as fever or increased redness at the surgical or wound site. Keep a Clean Home  · Clean or wipe commonly touched hard surfaces like door handles, sinks, tabletops, phones and TV remotes. · Use products labeled disinfectant to kill harmful bacteria and viruses. · Use a clean cloth or paper towel to clean and dry surfaces. Wiping surfaces with a dirty dishcloth, sponge or towel will only spread germs. · Never share toothbrushes, [place], drinking glasses, utensils, razor blades, face cloths or bath towels to avoid spreading germs. · Be sure that the linens that you sleep on are clean. · Keep pets away from wounds and wash your hands after touching pets, their toys or bedding. We care about you and your health. Remember, preventing infections is a team effort between you, your family, friends and health care providers. These are general instructions for a healthy lifestyle:    No smoking/ No tobacco products/ Avoid exposure to second hand smoke  Surgeon General's Warning:  Quitting smoking now greatly reduces serious risk to your health.   Obesity, smoking, and sedentary lifestyle greatly increases your risk for illness  A healthy diet, regular physical exercise & weight monitoring are important for maintaining a healthy lifestyle  You may be retaining fluid if you have a history of heart failure or if you experience any of the following symptoms:  Weight gain of 3 pounds or more overnight or 5 pounds in a week, increased swelling in our hands or feet or shortness of breath while lying flat in bed. Please call your doctor as soon as you notice any of these symptoms; do not wait until your next office visit. Recognize signs and symptoms of STROKE:  F-face looks uneven  A-arms unable to move or move unevenly  S-speech slurred or non-existent  T-time-call 911 as soon as signs and symptoms begin-DO NOT go       Back to bed or wait to see if you get better-TIME IS BRAIN. Learning About Coronavirus [phone])  Coronavirus [phone]): Overview  What is coronavirus (COVID-19)? The coronavirus disease (COVID-19) is caused by a virus. It is an illness that was first found in [place], [place], in December 2019. It has since spread worldwide. The virus can cause fever, cough, and trouble breathing. In severe cases, it can cause pneumonia and make it hard to breathe without help. It can cause death. Coronaviruses are a large group of viruses. They cause the common cold. They also cause more serious illnesses like [place] East respiratory syndrome (MERS) and severe acute respiratory syndrome (SARS). COVID-19 is caused by a novel coronavirus. That means it's a new type that has not been seen in people before. This virus spreads person-to-person through droplets from coughing and sneezing. It can also spread when you are close to someone who is infected. And it can spread when you touch something that has the virus on it, such as a doorknob or a tabletop. What can you do to protect yourself from coronavirus (COVID-19)? The best way to protect yourself from getting sick is to:  · Avoid areas where there is an outbreak. · Avoid contact with people who may be infected. · Wash your hands often with soap or alcohol-based hand sanitizers.   · Avoid crowds and try to stay at least 6 feet away from other people. · Wash your hands often, especially after you cough or sneeze. Use soap and water, and scrub for at least 20 seconds. If soap and water aren't available, use an alcohol-based hand . · Avoid touching your mouth, nose, and eyes. What can you do to avoid spreading the virus to others? To help avoid spreading the virus to others:  · Cover your mouth with a tissue when you cough or sneeze. Then throw the tissue in the trash. · Use a disinfectant to clean things that you touch often. · Wear a cloth face cover if you have to go to public areas. · Stay home if you are sick or have been exposed to the virus. Don't go to school, work, or public areas. And don't use public transportation, ride-shares, or taxis unless you have no choice. · If you are sick:  ? Leave your home only if you need to get medical care. But call the doctor's office first so they know you're coming. And wear a face cover. ? Wear the face cover whenever you're around other people. It can help stop the spread of the virus when you cough or sneeze. ? Clean and disinfect your home every day. Use household  and disinfectant wipes or sprays. Take special care to clean things that you grab with your hands. These include doorknobs, remote controls, phones, and handles on your refrigerator and microwave. And don't forget countertops, tabletops, bathrooms, and computer keyboards. When to call for help  [id number] anytime you think you may need emergency care. For example, call if:  · You have severe trouble breathing. (You can't talk at all.)  · You have constant chest pain or pressure. · You are severely dizzy or lightheaded. · You are confused or can't think clearly. · Your face and lips have a blue color. · You pass out (lose consciousness) or are very hard to wake up. Call your doctor now if you develop symptoms such as:  · Shortness of breath. · Fever. · Cough.   If you need to get care, call ahead to the doctor's office for instructions before you go. Make sure you wear a face cover to prevent exposing other people to the virus. Where can you get the latest information? The following health organizations are tracking and studying this virus. Their websites contain the most up-to-date information. Aiyana Andersons also learn what to do if you think you may have been exposed to the virus. · U.S. Centers for Disease Control and Prevention (CDC): The CDC provides updated news about the disease and travel advice. The website also tells you how to prevent the spread of infection. www.cdc.gov  · World Health Organization [facility]): WHO offers information about the virus outbreaks. WHO also has travel advice. www.who.int  Current as of: May 8, 2020               Content Version: 12.5  © 2006-2020 [employer]. Care instructions adapted under license by [employer] (which disclaims liability or warranty for this information). If you have questions about a medical condition or this instruction, always ask your healthcare professional. [address] any warranty or liability for your use of this information.

## 2022-05-20 NOTE — ANESTHESIA POSTPROCEDURE EVALUATION
Procedure(s):  HAND CARPAL TUNNEL RELEASE RIGHT patient has a biotronik pacemaker.     total IV anesthesia    Anesthesia Post Evaluation      Multimodal analgesia: multimodal analgesia used between 6 hours prior to anesthesia start to PACU discharge  Patient location during evaluation: bedside  Patient participation: complete - patient participated  Level of consciousness: awake and alert  Pain management: adequate  Airway patency: patent  Anesthetic complications: no  Cardiovascular status: acceptable  Respiratory status: acceptable  Hydration status: acceptable  Post anesthesia nausea and vomiting:  controlled  Final Post Anesthesia Temperature Assessment:  Normothermia (36.0-37.5 degrees C)      INITIAL Post-op Vital signs:   Vitals Value Taken Time   /60 05/20/22 0840   Temp 36.8 °C (98.3 °F) 05/20/22 0827   Pulse 61 05/20/22 0840   Resp 16 05/20/22 0840   SpO2 98 % 05/20/22 0840

## 2022-05-20 NOTE — ANESTHESIA PREPROCEDURE EVALUATION
Anesthetic History     PONV          Review of Systems / Medical History  Patient summary reviewed and pertinent labs reviewed    Pulmonary  Within defined limits                 Neuro/Psych   Within defined limits           Cardiovascular    Hypertension: well controlled          Pacemaker (Pacer for symptomatic bradycardia) and hyperlipidemia    Exercise tolerance: >4 METS     GI/Hepatic/Renal  Within defined limits              Endo/Other        Arthritis     Other Findings   Comments: Sciatica           Physical Exam    Airway  Mallampati: II  TM Distance: 4 - 6 cm  Neck ROM: normal range of motion   Mouth opening: Normal     Cardiovascular    Rhythm: regular  Rate: normal         Dental  No notable dental hx       Pulmonary  Breath sounds clear to auscultation               Abdominal  GI exam deferred       Other Findings            Anesthetic Plan    ASA: 3  Anesthesia type: total IV anesthesia          Induction: Intravenous  Anesthetic plan and risks discussed with: Patient and Family

## 2022-06-02 ENCOUNTER — OFFICE VISIT (OUTPATIENT)
Dept: ORTHOPEDIC SURGERY | Age: 87
End: 2022-06-02

## 2022-06-02 DIAGNOSIS — G56.03 CARPAL TUNNEL SYNDROME, BILATERAL: Primary | ICD-10-CM

## 2022-06-02 DIAGNOSIS — G56.22 CUBITAL TUNNEL SYNDROME ON LEFT: ICD-10-CM

## 2022-06-02 PROCEDURE — 99024 POSTOP FOLLOW-UP VISIT: CPT | Performed by: ORTHOPAEDIC SURGERY

## 2022-06-02 NOTE — PROGRESS NOTES
Orthopaedic Hand Surgery Note    Name: Crystal Best  YOB: 1934  Gender: female  MRN: [id number]    HPI: Patient is status post right carpal tunnel release. Patient reports sensation is improved. Night symptoms have resolved. No fevers or chills. She states that sensation continues to improve on the left as well, and only has numbness in the middle finger now. Physical Examination:  Wound healing well. Good finger and wrist range of motion. Sensation is improved from preoperative. Assessment:   1. Carpal tunnel syndrome, bilateral    2. Cubital tunnel syndrome on left        Status post right carpal tunnel release  Plan:  We discussed that the hand can get wet uncovered in the shower but no soaking the hand for 2 more weeks. After washing the hand the incision needs to be patted dry and covered. Lifting will be kept at 2 lbs and under with avoidance of undue stress on the hand for 2 weeks. Patient will see me in 4 weeks for reevaluation.     Jessie Newton MD  Orthopaedic Surgery  06/02/22  10:57 AM

## 2022-06-07 ENCOUNTER — OFFICE VISIT (OUTPATIENT)
Dept: INTERNAL MEDICINE CLINIC | Facility: CLINIC | Age: 87
End: 2022-06-07
Payer: MEDICARE

## 2022-06-07 VITALS
BODY MASS INDEX: 31.34 KG/M2 | HEIGHT: 61 IN | SYSTOLIC BLOOD PRESSURE: 138 MMHG | DIASTOLIC BLOOD PRESSURE: 80 MMHG | WEIGHT: 166 LBS

## 2022-06-07 DIAGNOSIS — M85.89 OSTEOPENIA OF MULTIPLE SITES: ICD-10-CM

## 2022-06-07 DIAGNOSIS — E83.52 HYPERCALCEMIA: ICD-10-CM

## 2022-06-07 DIAGNOSIS — I10 ESSENTIAL HYPERTENSION WITH GOAL BLOOD PRESSURE LESS THAN 130/80: Primary | ICD-10-CM

## 2022-06-07 DIAGNOSIS — R73.9 HYPERGLYCEMIA: ICD-10-CM

## 2022-06-07 DIAGNOSIS — C91.10 CHRONIC LYMPHOCYTIC LEUKEMIA (HCC): ICD-10-CM

## 2022-06-07 PROBLEM — D50.9 IRON DEFICIENCY ANEMIA: Status: RESOLVED | Noted: 2020-09-09 | Resolved: 2022-06-07

## 2022-06-07 PROBLEM — R19.7 DIARRHEA OF PRESUMED INFECTIOUS ORIGIN: Status: ACTIVE | Noted: 2017-08-30

## 2022-06-07 PROCEDURE — G8417 CALC BMI ABV UP PARAM F/U: HCPCS | Performed by: INTERNAL MEDICINE

## 2022-06-07 PROCEDURE — 1123F ACP DISCUSS/DSCN MKR DOCD: CPT | Performed by: INTERNAL MEDICINE

## 2022-06-07 PROCEDURE — 99214 OFFICE O/P EST MOD 30 MIN: CPT | Performed by: INTERNAL MEDICINE

## 2022-06-07 PROCEDURE — 1090F PRES/ABSN URINE INCON ASSESS: CPT | Performed by: INTERNAL MEDICINE

## 2022-06-07 PROCEDURE — 1036F TOBACCO NON-USER: CPT | Performed by: INTERNAL MEDICINE

## 2022-06-07 PROCEDURE — G8427 DOCREV CUR MEDS BY ELIG CLIN: HCPCS | Performed by: INTERNAL MEDICINE

## 2022-06-07 RX ORDER — OYSTER SHELL CALCIUM WITH VITAMIN D 500; 200 MG/1; [IU]/1
1 TABLET, FILM COATED ORAL DAILY
COMMUNITY
Start: 2015-09-25

## 2022-06-07 RX ORDER — ZINC GLUCONATE 50 MG
50 TABLET ORAL DAILY
COMMUNITY

## 2022-06-07 ASSESSMENT — PATIENT HEALTH QUESTIONNAIRE - PHQ9
SUM OF ALL RESPONSES TO PHQ QUESTIONS 1-9: 0
SUM OF ALL RESPONSES TO PHQ QUESTIONS 1-9: 0
2. FEELING DOWN, DEPRESSED OR HOPELESS: 0
SUM OF ALL RESPONSES TO PHQ QUESTIONS 1-9: 0
SUM OF ALL RESPONSES TO PHQ QUESTIONS 1-9: 0
1. LITTLE INTEREST OR PLEASURE IN DOING THINGS: 0
SUM OF ALL RESPONSES TO PHQ9 QUESTIONS 1 & 2: 0

## 2022-06-07 NOTE — PROGRESS NOTES
She has had diarrhea ever since her colon cancer resection. She has had hand surgery and wearing compression hose      Past Medical History, Past Surgical History, Family history, Social History, and Medications were all reviewed with the patient today and updated as necessary. She is regaining feeling in both of her hands after having bilateral carpal tunnel release and ulnar nerve release on the left. She is driving now    She now is wearing compression socks on her feet. Current Outpatient Medications   Medication Sig Dispense Refill    zinc 50 MG TABS tablet Take 50 mg by mouth daily      cyanocobalamin ([employer] VITAMIN B12) 1000 MCG tablet Take 1,000 mcg by mouth daily       calcium-vitamin D (OSCAL-500) 500-200 MG-UNIT per tablet Take 1 tablet by mouth daily       acetaminophen (TYLENOL) 325 MG tablet Take 650 mg by mouth every 4 hours as needed       alendronate (FOSAMAX) 70 MG tablet Take 70 mg by mouth every 7 days      atorvastatin (LIPITOR) 10 MG tablet Take 10 mg by mouth daily      vitamin D 25 MCG (1000 UT) CAPS Take 1,000 Units by mouth daily      diclofenac sodium (VOLTAREN) 1 % GEL Apply 4 g topically 4 times daily      losartan (COZAAR) 100 MG tablet Take 100 mg by mouth daily      meclizine (ANTIVERT) 25 MG tablet Take 25 mg by mouth 3 times daily as needed      ondansetron (ZOFRAN-ODT) 4 MG disintegrating tablet Take 4 mg by mouth every 8 hours as needed       No current facility-administered medications for this visit.      Allergies   Allergen Reactions    Hydrocodone-Acetaminophen Nausea And Vomiting, Other (See Comments) and Rash    Lisinopril Other (See Comments)     Antihypertensives     Patient Active Problem List   Diagnosis    Osteoarthritis of right knee    Sciatica    Osteoporosis    Hypercalcemia    Elevated CEA    Closed nondisplaced fracture of left pubis with routine healing    Bradycardia    Glucose intolerance (no malabsorption)    Chronic lymphocytic leukemia ([address]    Osteopenia of multiple sites    History of pacemaker    Fall    Chronic midline low back pain without sciatica    HLD (hyperlipidemia)    Essential hypertension with goal blood pressure less than 130/80    Total knee replacement status    Pacemaker    Disorder of right rotator cuff    History of colorectal cancer    Hyperglycemia, unspecified    Vitamin D deficiency    Diarrhea of presumed infectious origin    Malignant neoplasm of sigmoid colon ([address]    Osteopenia, senile         Review of Systems   Constitutional: Negative for unexpected weight change. Genitourinary: Negative for flank pain. OBJECTIVE:  /80   Ht 5' 1\" (1.549 m)   Wt 166 lb (75.3 kg)   BMI 31.37 kg/m²      Physical Exam  Constitutional:       Appearance: Normal appearance. Neurological:      Mental Status: She is alert. Psychiatric:         Mood and Affect: Mood normal.         Behavior: Behavior normal.          Medical problems and test results were reviewed with the patient today.      Recent Results (from the past 672 hour(s))   Hemoglobin A1c with eAG    Collection Time: 05/17/22  1:51 PM   Result Value Ref Range    Hemoglobin A1C 6.5 (H) 4.8 - 5.6 %    eAG 140 mg/dL   Lipid Panel with LDL/HDL Ratio    Collection Time: 05/17/22  1:51 PM   Result Value Ref Range    Cholesterol, Total 149 100 - 199 mg/dL    Triglycerides 128 0 - 149 mg/dL    HDL 59 >39 mg/dL    VLDL 22 5 - 40 mg/dL    LDL Calculated 68 0 - 99 mg/dL    LDl/HDL Ratio 1.2 0.0 - 3.2 ratio   Comprehensive Metabolic Panel    Collection Time: 05/17/22  1:51 PM   Result Value Ref Range    Glucose 106 (H) 65 - 99 mg/dL    BUN 23 8 - 27 mg/dL    CREATININE 0.96 0.57 - 1.00 mg/dL    EGFR 57 (L) >59 mL/min/1.73    Bun/Cre Ratio 24 12 - 28 NA    Sodium 140 134 - 144 mmol/L    Potassium 4.9 3.5 - 5.2 mmol/L    Chloride 102 96 - 106 mmol/L    CO2 20 20 - 29 mmol/L    Calcium 10.5 (H) 8.7 - 10.3 mg/dL    Total Protein 6.6 6.0 - 8.5 g/dL Albumin 4.9 (H) 3.6 - 4.6 g/dL    Globulin, Total 1.7 1.5 - 4.5 g/dL    Albumin/Globulin Ratio 2.9 (H) 1.2 - 2.2 NA    Total Bilirubin 0.4 0.0 - 1.2 mg/dL    Alkaline Phosphatase 58 44 - 121 IU/L    AST 17 0 - 40 IU/L    ALT 14 0 - 32 IU/L   CVD REPORT    Collection Time: 05/17/22  1:51 PM   Result Value Ref Range    Interpretation Note    CKD REPORT    Collection Time: 05/17/22  1:51 PM   Result Value Ref Range    Interpretation Note          ASSESSMENT and PLAN    1. Essential hypertension with goal blood pressure less than 130/80  2. Hyperglycemia  3. Hypercalcemia  4. Chronic lymphocytic leukemia ([facility] [address]  5. Osteopenia of multiple sites       the following changes in treatment are made stop clacium supplement  lab results and schedule for future lab studies reviewed with patient  reviewed medications and side effects in detail     A1C higher and can try metformin but may not be able to take with her susceptibility for loose stools, she will work on her weight also    We discussed again her hyperparathyroidism and no stones, no progression of BMD and calcium under 11. No follow-ups on file.

## 2022-11-11 ENCOUNTER — OFFICE VISIT (OUTPATIENT)
Dept: INTERNAL MEDICINE CLINIC | Facility: CLINIC | Age: 87
End: 2022-11-11
Payer: MEDICARE

## 2022-11-11 VITALS
BODY MASS INDEX: 30.58 KG/M2 | WEIGHT: 162 LBS | SYSTOLIC BLOOD PRESSURE: 128 MMHG | DIASTOLIC BLOOD PRESSURE: 78 MMHG | HEIGHT: 61 IN

## 2022-11-11 DIAGNOSIS — E78.00 PURE HYPERCHOLESTEROLEMIA, UNSPECIFIED: ICD-10-CM

## 2022-11-11 DIAGNOSIS — Z78.0 MENOPAUSE: ICD-10-CM

## 2022-11-11 DIAGNOSIS — Z12.11 COLON CANCER SCREENING: ICD-10-CM

## 2022-11-11 DIAGNOSIS — Z13.39 SCREENING FOR ALCOHOLISM: ICD-10-CM

## 2022-11-11 DIAGNOSIS — E55.9 VITAMIN D DEFICIENCY: ICD-10-CM

## 2022-11-11 DIAGNOSIS — Z12.31 ENCOUNTER FOR SCREENING MAMMOGRAM FOR BREAST CANCER: ICD-10-CM

## 2022-11-11 DIAGNOSIS — Z00.00 MEDICARE ANNUAL WELLNESS VISIT, SUBSEQUENT: Primary | ICD-10-CM

## 2022-11-11 DIAGNOSIS — I10 ESSENTIAL HYPERTENSION WITH GOAL BLOOD PRESSURE LESS THAN 130/80: ICD-10-CM

## 2022-11-11 DIAGNOSIS — Z23 ENCOUNTER FOR IMMUNIZATION: ICD-10-CM

## 2022-11-11 DIAGNOSIS — Z23 NEED FOR INFLUENZA VACCINATION: ICD-10-CM

## 2022-11-11 DIAGNOSIS — Z13.31 SCREENING FOR DEPRESSION: ICD-10-CM

## 2022-11-11 DIAGNOSIS — Z11.59 SCREENING FOR VIRAL DISEASE: ICD-10-CM

## 2022-11-11 LAB
25(OH)D3 SERPL-MCNC: 40.7 NG/ML (ref 30–100)
ALBUMIN SERPL-MCNC: 3.9 G/DL (ref 3.2–4.6)
ALBUMIN/GLOB SERPL: 1.4 {RATIO} (ref 0.4–1.6)
ALP SERPL-CCNC: 51 U/L (ref 50–136)
ALT SERPL-CCNC: 21 U/L (ref 12–65)
ANION GAP SERPL CALC-SCNC: 3 MMOL/L (ref 2–11)
AST SERPL-CCNC: 12 U/L (ref 15–37)
BASOPHILS # BLD: 0 K/UL (ref 0–0.2)
BASOPHILS NFR BLD: 0 % (ref 0–2)
BILIRUB SERPL-MCNC: 0.5 MG/DL (ref 0.2–1.1)
BUN SERPL-MCNC: 16 MG/DL (ref 8–23)
CALCIUM SERPL-MCNC: 10.2 MG/DL (ref 8.3–10.4)
CHLORIDE SERPL-SCNC: 106 MMOL/L (ref 101–110)
CHOLEST SERPL-MCNC: 134 MG/DL
CO2 SERPL-SCNC: 29 MMOL/L (ref 21–32)
CREAT SERPL-MCNC: 0.8 MG/DL (ref 0.6–1)
DIFFERENTIAL METHOD BLD: ABNORMAL
EOSINOPHIL # BLD: 0 K/UL (ref 0–0.8)
EOSINOPHIL NFR BLD: 0 % (ref 0.5–7.8)
ERYTHROCYTE [DISTWIDTH] IN BLOOD BY AUTOMATED COUNT: 12.7 % (ref 11.9–14.6)
GLOBULIN SER CALC-MCNC: 2.7 G/DL (ref 2.8–4.5)
GLUCOSE SERPL-MCNC: 135 MG/DL (ref 65–100)
HCT VFR BLD AUTO: 40.4 % (ref 35.8–46.3)
HDLC SERPL-MCNC: 60 MG/DL (ref 40–60)
HDLC SERPL: 2.2 {RATIO}
HGB BLD-MCNC: 13 G/DL (ref 11.7–15.4)
IMM GRANULOCYTES # BLD AUTO: 0.1 K/UL (ref 0–0.5)
IMM GRANULOCYTES NFR BLD AUTO: 1 % (ref 0–5)
LDLC SERPL CALC-MCNC: 59 MG/DL
LYMPHOCYTES # BLD: 2.4 K/UL (ref 0.5–4.6)
LYMPHOCYTES NFR BLD: 17 % (ref 13–44)
MCH RBC QN AUTO: 31 PG (ref 26.1–32.9)
MCHC RBC AUTO-ENTMCNC: 32.2 G/DL (ref 31.4–35)
MCV RBC AUTO: 96.4 FL (ref 82–102)
MONOCYTES # BLD: 0.6 K/UL (ref 0.1–1.3)
MONOCYTES NFR BLD: 4 % (ref 4–12)
NEUTS SEG # BLD: 11.3 K/UL (ref 1.7–8.2)
NEUTS SEG NFR BLD: 78 % (ref 43–78)
NRBC # BLD: 0 K/UL (ref 0–0.2)
PLATELET # BLD AUTO: 358 K/UL (ref 150–450)
PMV BLD AUTO: 10.1 FL (ref 9.4–12.3)
POTASSIUM SERPL-SCNC: 4.3 MMOL/L (ref 3.5–5.1)
PROT SERPL-MCNC: 6.6 G/DL (ref 6.3–8.2)
RBC # BLD AUTO: 4.19 M/UL (ref 4.05–5.2)
SODIUM SERPL-SCNC: 138 MMOL/L (ref 133–143)
TRIGL SERPL-MCNC: 75 MG/DL (ref 35–150)
TSH, 3RD GENERATION: 1.82 UIU/ML (ref 0.36–3.74)
VLDLC SERPL CALC-MCNC: 15 MG/DL (ref 6–23)
WBC # BLD AUTO: 14.4 K/UL (ref 4.3–11.1)

## 2022-11-11 PROCEDURE — G8484 FLU IMMUNIZE NO ADMIN: HCPCS | Performed by: PHYSICIAN ASSISTANT

## 2022-11-11 PROCEDURE — G0439 PPPS, SUBSEQ VISIT: HCPCS | Performed by: PHYSICIAN ASSISTANT

## 2022-11-11 PROCEDURE — 1123F ACP DISCUSS/DSCN MKR DOCD: CPT | Performed by: PHYSICIAN ASSISTANT

## 2022-11-11 ASSESSMENT — PATIENT HEALTH QUESTIONNAIRE - PHQ9
SUM OF ALL RESPONSES TO PHQ QUESTIONS 1-9: 0
SUM OF ALL RESPONSES TO PHQ QUESTIONS 1-9: 0
2. FEELING DOWN, DEPRESSED OR HOPELESS: 0
SUM OF ALL RESPONSES TO PHQ QUESTIONS 1-9: 0
1. LITTLE INTEREST OR PLEASURE IN DOING THINGS: 0
SUM OF ALL RESPONSES TO PHQ QUESTIONS 1-9: 0
SUM OF ALL RESPONSES TO PHQ9 QUESTIONS 1 & 2: 0

## 2022-11-11 ASSESSMENT — LIFESTYLE VARIABLES
HOW OFTEN DO YOU HAVE A DRINK CONTAINING ALCOHOL: NEVER
HOW MANY STANDARD DRINKS CONTAINING ALCOHOL DO YOU HAVE ON A TYPICAL DAY: PATIENT DOES NOT DRINK

## 2022-11-11 NOTE — PROGRESS NOTES
Medicare Annual Wellness Visit    [facility] is here for Medicare AWV (Pt here for AWE part one and she is fasting today)    Assessment & Plan   Medicare annual wellness visit, subsequent  Screening for depression  Screening for alcoholism  Encounter for immunization  Need for influenza vaccination  Screening for viral disease  Colon cancer screening  Encounter for screening mammogram for breast cancer  Menopause  Essential hypertension with goal blood pressure less than 130/80  Pure hypercholesterolemia, unspecified  -     CBC with Auto Differential; Future  -     Comprehensive Metabolic Panel; Future  -     Lipid Panel; Future  -     TSH; Future  Vitamin D deficiency  -     Vitamin D 25 Hydroxy; Future    Recommendations for Preventive Services Due: see orders and patient instructions/AVS.  Recommended screening schedule for the next 5-10 years is provided to the patient in written form: see Patient Instructions/AVS.     Return for Medicare Annual Wellness Visit in 1 year. Subjective       Patient's complete Health Risk Assessment and screening values have been reviewed and are found in Flowsheets. The following problems were reviewed today and where indicated follow up appointments were made and/or referrals ordered.     Positive Risk Factor Screenings with Interventions:    Fall Risk:  Do you feel unsteady or are you worried about falling? : (!) yes  2 or more falls in past year?: no  Fall with injury in past year?: no   Fall Risk Interventions:    Home safety tips provided            General Health and ACP:  General  In general, how would you say your health is?: Good  In the past 7 days, have you experienced any of the following: New or Increased Pain, New or Increased Fatigue, Loneliness, Social Isolation, Stress or Anger?: (!) Yes  Select all that apply: (!) New or Increased Pain  Do you get the social and emotional support that you need?: Yes  Do you have a Living Will?: Yes    Advance Directives       Power of [place] Living Will ACP-Advance Directive ACP-Power of Annabelle Johansen on 07/31/18 Filed on 07/31/18 Valerie Bing          General Health Risk Interventions: Addressed at follow up    Health Habits/Nutrition:  Physical Activity: Insufficiently Active    Days of Exercise per Week: 5 days    Minutes of Exercise per Session: 10 min     Have you lost any weight without trying in the past 3 months?: No  Body mass index: (!) 30.61  Have you seen the dentist within the past year?: Yes  Health Habits/Nutrition Interventions: Address more at follow up             Objective   Vitals:    11/11/22 0944   BP: 128/78   Weight: 162 lb (73.5 kg)   Height: 5' 1\" (1.549 m)      Body mass index is 30.61 kg/m². Allergies   Allergen Reactions    Hydrocodone-Acetaminophen Nausea And Vomiting, Other (See Comments) and Rash    Lisinopril Other (See Comments)     Antihypertensives     Prior to Visit Medications    Medication Sig Taking?  Authorizing Provider   zinc 50 MG TABS tablet Take 50 mg by mouth daily Yes Historical Provider, MD   cyanocobalamin 1000 MCG tablet Take 1,000 mcg by mouth daily  Yes Historical Provider, MD   calcium-vitamin D (OSCAL-500) 500-200 MG-UNIT per tablet Take 1 tablet by mouth daily  Yes Historical Provider, MD   metFORMIN (GLUCOPHAGE) 500 MG tablet Take 1 tablet by mouth daily (with breakfast) Yes [place] Mohs, MD   acetaminophen (TYLENOL) 325 MG tablet Take 650 mg by mouth every 4 hours as needed  Yes Ar Automatic Reconciliation   alendronate (FOSAMAX) 70 MG tablet Take 70 mg by mouth every 7 days Yes Ar Automatic Reconciliation   atorvastatin (LIPITOR) 10 MG tablet Take 10 mg by mouth daily Yes Ar Automatic Reconciliation   vitamin D 25 MCG (1000 UT) CAPS Take 1,000 Units by mouth daily Yes Ar Automatic Reconciliation   diclofenac sodium (VOLTAREN) 1 % GEL Apply 4 g topically 4 times daily as needed Yes Ar Automatic Reconciliation   losartan (COZAAR) 100 MG tablet Take 100 mg by mouth daily Yes Ar Automatic Reconciliation   meclizine (ANTIVERT) 25 MG tablet Take 25 mg by mouth 3 times daily as needed Yes Ar Automatic Reconciliation   ondansetron (ZOFRAN-ODT) 4 MG disintegrating tablet Take 4 mg by mouth every 8 hours as needed Yes Ar Automatic Reconciliation       CareTeam (Including outside providers/suppliers regularly involved in providing care):   Patient Care Team:  Jaja Bueno MD as PCP - Ghada Solis MD as PCP - [facility]  Dutch Patino MD as Consulting Physician (Cardiology)  BERT Ocampo - CNP (Nurse Practitioner [facility])  Saira Steve MD (Hand Surgery)  Zack Bradley MA as Tech (Orthopaedic Surgery)  Bob Youssef MD (Hematology and Oncology)     Reviewed and updated this visit:  Tobacco  Allergies  Meds  Problems  Med Hx  Surg Hx  Soc Hx  Fam Hx

## 2022-11-11 NOTE — PATIENT INSTRUCTIONS
Personalized Preventive Plan for Lisbet Deras - 11/11/2022  Medicare offers a range of preventive health benefits. Some of the tests and screenings are paid in full while other may be subject to a deductible, co-insurance, and/or copay. Some of these benefits include a comprehensive review of your medical history including lifestyle, illnesses that may run in your family, and various assessments and screenings as appropriate. After reviewing your medical record and screening and assessments performed today your provider may have ordered immunizations, labs, imaging, and/or referrals for you. A list of these orders (if applicable) as well as your Preventive Care list are included within your After Visit Summary for your review. Other Preventive Recommendations:    A preventive eye exam performed by an eye specialist is recommended every 1-2 years to screen for glaucoma; cataracts, macular degeneration, and other eye disorders. A preventive dental visit is recommended every 6 months. Try to get at least 150 minutes of exercise per week or 10,000 steps per day on a pedometer . Order or download the FREE \"Exercise & Physical Activity: Your Everyday Guide\" from [employer]. Call 2-740.218.7012 or search [employer] online. You need 4418-1618 mg of calcium and 9555-4594 IU of vitamin D per day. It is possible to meet your calcium requirement with diet alone, but a vitamin D supplement is usually necessary to meet this goal.  When exposed to the sun, use a sunscreen that protects against both UVA and UVB radiation with an SPF of 30 or greater. Reapply every 2 to 3 hours or after sweating, drying off with a towel, or swimming. Always wear a seat belt when traveling in a car. Always wear a helmet when riding a bicycle or motorcycle.

## 2022-11-17 PROBLEM — E78.00 PURE HYPERCHOLESTEROLEMIA, UNSPECIFIED: Status: ACTIVE | Noted: 2022-11-17

## 2022-11-17 PROBLEM — R73.9 HYPERGLYCEMIA: Status: ACTIVE | Noted: 2017-03-16

## 2022-11-18 ENCOUNTER — OFFICE VISIT (OUTPATIENT)
Dept: INTERNAL MEDICINE CLINIC | Facility: CLINIC | Age: 87
End: 2022-11-18
Payer: MEDICARE

## 2022-11-18 VITALS
DIASTOLIC BLOOD PRESSURE: 72 MMHG | SYSTOLIC BLOOD PRESSURE: 136 MMHG | HEIGHT: 61 IN | BODY MASS INDEX: 29.64 KG/M2 | WEIGHT: 157 LBS

## 2022-11-18 DIAGNOSIS — R73.9 HYPERGLYCEMIA: ICD-10-CM

## 2022-11-18 DIAGNOSIS — Z85.048 PERSONAL HISTORY OF OTHER MALIGNANT NEOPLASM OF RECTUM, RECTOSIGMOID JUNCTION, AND ANUS: ICD-10-CM

## 2022-11-18 DIAGNOSIS — E78.00 PURE HYPERCHOLESTEROLEMIA, UNSPECIFIED: Primary | ICD-10-CM

## 2022-11-18 DIAGNOSIS — I10 ESSENTIAL HYPERTENSION WITH GOAL BLOOD PRESSURE LESS THAN 130/80: ICD-10-CM

## 2022-11-18 DIAGNOSIS — M54.50 CHRONIC BILATERAL LOW BACK PAIN WITHOUT SCIATICA: ICD-10-CM

## 2022-11-18 DIAGNOSIS — R42 EPISODE OF DIZZINESS: ICD-10-CM

## 2022-11-18 DIAGNOSIS — M81.0 AGE-RELATED OSTEOPOROSIS WITHOUT CURRENT PATHOLOGICAL FRACTURE: ICD-10-CM

## 2022-11-18 DIAGNOSIS — C91.10 CHRONIC LYMPHOCYTIC LEUKEMIA (HCC): ICD-10-CM

## 2022-11-18 DIAGNOSIS — G89.29 CHRONIC BILATERAL LOW BACK PAIN WITHOUT SCIATICA: ICD-10-CM

## 2022-11-18 LAB
EST. AVERAGE GLUCOSE BLD GHB EST-MCNC: 137 MG/DL
HBA1C MFR BLD: 6.4 % (ref 4.8–5.6)

## 2022-11-18 PROCEDURE — 1036F TOBACCO NON-USER: CPT | Performed by: INTERNAL MEDICINE

## 2022-11-18 PROCEDURE — G8427 DOCREV CUR MEDS BY ELIG CLIN: HCPCS | Performed by: INTERNAL MEDICINE

## 2022-11-18 PROCEDURE — 1123F ACP DISCUSS/DSCN MKR DOCD: CPT | Performed by: INTERNAL MEDICINE

## 2022-11-18 PROCEDURE — G8417 CALC BMI ABV UP PARAM F/U: HCPCS | Performed by: INTERNAL MEDICINE

## 2022-11-18 PROCEDURE — 99214 OFFICE O/P EST MOD 30 MIN: CPT | Performed by: INTERNAL MEDICINE

## 2022-11-18 PROCEDURE — 1090F PRES/ABSN URINE INCON ASSESS: CPT | Performed by: INTERNAL MEDICINE

## 2022-11-18 PROCEDURE — G8484 FLU IMMUNIZE NO ADMIN: HCPCS | Performed by: INTERNAL MEDICINE

## 2022-11-18 RX ORDER — MECLIZINE HYDROCHLORIDE 25 MG/1
25 TABLET ORAL 3 TIMES DAILY PRN
Qty: 15 TABLET | Refills: 0
Start: 2022-11-18 | End: 2022-11-28

## 2022-11-18 RX ORDER — ALENDRONATE SODIUM 70 MG/1
70 TABLET ORAL
Qty: 12 TABLET | Refills: 3 | Status: SHIPPED | OUTPATIENT
Start: 2022-11-18

## 2022-11-18 RX ORDER — ATORVASTATIN CALCIUM 10 MG/1
10 TABLET, FILM COATED ORAL DAILY
Qty: 90 TABLET | Refills: 3 | Status: SHIPPED | OUTPATIENT
Start: 2022-11-18

## 2022-11-18 RX ORDER — LOSARTAN POTASSIUM 100 MG/1
100 TABLET ORAL DAILY
Qty: 90 TABLET | Refills: 3 | Status: SHIPPED | OUTPATIENT
Start: 2022-11-18

## 2022-11-18 RX ORDER — MECLIZINE HYDROCHLORIDE 25 MG/1
25 TABLET ORAL 3 TIMES DAILY PRN
Status: CANCELLED | OUTPATIENT
Start: 2022-11-18

## 2022-11-18 ASSESSMENT — PATIENT HEALTH QUESTIONNAIRE - PHQ9
SUM OF ALL RESPONSES TO PHQ QUESTIONS 1-9: 0
SUM OF ALL RESPONSES TO PHQ9 QUESTIONS 1 & 2: 0
2. FEELING DOWN, DEPRESSED OR HOPELESS: 0
1. LITTLE INTEREST OR PLEASURE IN DOING THINGS: 0
SUM OF ALL RESPONSES TO PHQ QUESTIONS 1-9: 0

## 2022-11-18 ASSESSMENT — ENCOUNTER SYMPTOMS: BACK PAIN: 1

## 2022-11-18 NOTE — PROGRESS NOTES
She is doing well, and BP controlled,   She is getting a shot in her hips from her pain management . Past Medical History, Past Surgical History, Family history, Social History, and Medications were all reviewed with the patient today and updated as necessary. Current Outpatient Medications   Medication Sig Dispense Refill    zinc 50 MG TABS tablet Take 50 mg by mouth daily      cyanocobalamin 1000 MCG tablet Take 1,000 mcg by mouth daily       calcium-vitamin D (OSCAL-500) 500-200 MG-UNIT per tablet Take 1 tablet by mouth daily       acetaminophen (TYLENOL) 325 MG tablet Take 650 mg by mouth every 4 hours as needed       alendronate (FOSAMAX) 70 MG tablet Take 70 mg by mouth every 7 days      atorvastatin (LIPITOR) 10 MG tablet Take 10 mg by mouth daily      vitamin D 25 MCG (1000 UT) CAPS Take 1,000 Units by mouth daily      diclofenac sodium (VOLTAREN) 1 % GEL Apply 4 g topically 4 times daily as needed      losartan (COZAAR) 100 MG tablet Take 100 mg by mouth daily      meclizine (ANTIVERT) 25 MG tablet Take 25 mg by mouth 3 times daily as needed      ondansetron (ZOFRAN-ODT) 4 MG disintegrating tablet Take 4 mg by mouth every 8 hours as needed       No current facility-administered medications for this visit.      Allergies   Allergen Reactions    Hydrocodone-Acetaminophen Nausea And Vomiting, Other (See Comments) and Rash    Metformin And Related Diarrhea    Lisinopril Other (See Comments)     Antihypertensives     Patient Active Problem List   Diagnosis    Osteoarthritis of right knee    Sciatica    Osteoporosis    Hypercalcemia    Elevated CEA    Closed nondisplaced fracture of left pubis with routine healing    Bradycardia    Glucose intolerance (no malabsorption)    Chronic lymphocytic leukemia (HCC)    Osteopenia of multiple sites    History of pacemaker    Fall    Chronic midline low back pain without sciatica    HLD (hyperlipidemia)    Essential hypertension with goal blood pressure less than 130/80    Total knee replacement status    Pacemaker    Disorder of right rotator cuff    Personal history of other malignant neoplasm of rectum, rectosigmoid junction, and anus    Hyperglycemia    Vitamin D deficiency    Diarrhea of presumed infectious origin    Malignant neoplasm of sigmoid colon (HCC)    Osteopenia, senile    Pure hypercholesterolemia, unspecified         Review of Systems   Musculoskeletal:  Positive for back pain. Psychiatric/Behavioral:  Negative for sleep disturbance. OBJECTIVE:  /72   Ht 5' 1\" (1.549 m)   Wt 157 lb (71.2 kg)   BMI 29.66 kg/m²      Physical Exam  Constitutional:       Appearance: Normal appearance. HENT:      Head: Normocephalic. Right Ear: Tympanic membrane normal.      Left Ear: Tympanic membrane normal.      Mouth/Throat:      Mouth: Mucous membranes are moist.      Pharynx: Oropharynx is clear. Eyes:      Extraocular Movements: Extraocular movements intact. Pupils: Pupils are equal, round, and reactive to light. Neck:      Thyroid: No thyromegaly. Vascular: No carotid bruit. Cardiovascular:      Rate and Rhythm: Normal rate and regular rhythm. Pulses: Normal pulses. Heart sounds: No murmur heard. Pulmonary:      Effort: Pulmonary effort is normal.      Breath sounds: Normal breath sounds. Abdominal:      General: Abdomen is flat. Bowel sounds are normal.      Palpations: Abdomen is soft. There is no hepatomegaly or splenomegaly. Musculoskeletal:         General: No swelling. Cervical back: Normal range of motion. Right lower leg: No edema. Left lower leg: No edema. Skin:     General: Skin is warm and dry. Neurological:      General: No focal deficit present. Mental Status: She is alert and oriented to person, place, and time.       Gait: Gait normal.      Deep Tendon Reflexes: Reflexes normal.   Psychiatric:         Mood and Affect: Mood normal. Behavior: Behavior normal.        Medical problems and test results were reviewed with the patient today.      Recent Results (from the past 672 hour(s))   CBC with Auto Differential    Collection Time: 11/11/22 10:26 AM   Result Value Ref Range    WBC 14.4 (H) 4.3 - 11.1 K/uL    RBC 4.19 4.05 - 5.2 M/uL    Hemoglobin 13.0 11.7 - 15.4 g/dL    Hematocrit 40.4 35.8 - 46.3 %    MCV 96.4 82 - 102 FL    MCH 31.0 26.1 - 32.9 PG    MCHC 32.2 31.4 - 35.0 g/dL    RDW 12.7 11.9 - 14.6 %    Platelets 884 137 - 899 K/uL    MPV 10.1 9.4 - 12.3 FL    nRBC 0.00 0.0 - 0.2 K/uL    Differential Type AUTOMATED      Seg Neutrophils 78 43 - 78 %    Lymphocytes 17 13 - 44 %    Monocytes 4 4.0 - 12.0 %    Eosinophils % 0 (L) 0.5 - 7.8 %    Basophils 0 0.0 - 2.0 %    Immature Granulocytes 1 0.0 - 5.0 %    Segs Absolute 11.3 (H) 1.7 - 8.2 K/UL    Absolute Lymph # 2.4 0.5 - 4.6 K/UL    Absolute Mono # 0.6 0.1 - 1.3 K/UL    Absolute Eos # 0.0 0.0 - 0.8 K/UL    Basophils Absolute 0.0 0.0 - 0.2 K/UL    Absolute Immature Granulocyte 0.1 0.0 - 0.5 K/UL   Comprehensive Metabolic Panel    Collection Time: 11/11/22 10:26 AM   Result Value Ref Range    Sodium 138 133 - 143 mmol/L    Potassium 4.3 3.5 - 5.1 mmol/L    Chloride 106 101 - 110 mmol/L    CO2 29 21 - 32 mmol/L    Anion Gap 3 2 - 11 mmol/L    Glucose 135 (H) 65 - 100 mg/dL    BUN 16 8 - 23 MG/DL    Creatinine 0.80 0.6 - 1.0 MG/DL    Est, Glom Filt Rate >60 >60 ml/min/1.73m2    Calcium 10.2 8.3 - 10.4 MG/DL    Total Bilirubin 0.5 0.2 - 1.1 MG/DL    ALT 21 12 - 65 U/L    AST 12 (L) 15 - 37 U/L    Alk Phosphatase 51 50 - 136 U/L    Total Protein 6.6 6.3 - 8.2 g/dL    Albumin 3.9 3.2 - 4.6 g/dL    Globulin 2.7 (L) 2.8 - 4.5 g/dL    Albumin/Globulin Ratio 1.4 0.4 - 1.6     Lipid Panel    Collection Time: 11/11/22 10:26 AM   Result Value Ref Range    Cholesterol, Total 134 <200 MG/DL    Triglycerides 75 35 - 150 MG/DL    HDL 60 40 - 60 MG/DL    LDL Calculated 59 <100 MG/DL    VLDL Cholesterol Calculated 15 6.0 - 23.0 MG/DL    Chol/HDL Ratio 2.2     Vitamin D 25 Hydroxy    Collection Time: 11/11/22 10:26 AM   Result Value Ref Range    Vit D, 25-Hydroxy 40.7 30.0 - 100.0 ng/mL   TSH    Collection Time: 11/11/22 10:26 AM   Result Value Ref Range    TSH, 3RD GENERATION 1.820 0.358 - 3.740 uIU/mL         ASSESSMENT and PLAN    1. Pure hypercholesterolemia, unspecified  The following orders have not been finalized:  -     atorvastatin (LIPITOR) 10 MG tablet  2. Hyperglycemia  3. Personal history of other malignant neoplasm of rectum, rectosigmoid junction, and anus  4. Hypercalcemia  5. Age-related osteoporosis without current pathological fracture  The following orders have not been finalized:  -     alendronate (FOSAMAX) 70 MG tablet  6. Chronic lymphocytic leukemia ([facility] [address]  7. Essential hypertension with goal blood pressure less than 130/80  The following orders have not been finalized:  -     losartan (COZAAR) 100 MG tablet  8.  Episode of dizziness  The following orders have not been finalized:  -     meclizine (ANTIVERT) 25 MG tablet       She is doing great, except for back pain  A1C pending and she will try to cut back on her sugars and can do referral if needed

## 2023-01-17 DIAGNOSIS — Z95.0 PACEMAKER: Primary | ICD-10-CM

## 2023-01-17 DIAGNOSIS — R00.1 BRADYCARDIA: ICD-10-CM

## 2023-01-17 DIAGNOSIS — Z95.0 PACEMAKER: ICD-10-CM

## 2023-04-28 DIAGNOSIS — Z95.0 PACEMAKER: ICD-10-CM

## 2023-04-28 DIAGNOSIS — R00.1 BRADYCARDIA: ICD-10-CM

## 2023-05-12 DIAGNOSIS — E78.00 PURE HYPERCHOLESTEROLEMIA, UNSPECIFIED: ICD-10-CM

## 2023-05-12 DIAGNOSIS — I10 ESSENTIAL HYPERTENSION WITH GOAL BLOOD PRESSURE LESS THAN 130/80: ICD-10-CM

## 2023-05-12 DIAGNOSIS — R73.9 HYPERGLYCEMIA: ICD-10-CM

## 2023-05-12 LAB
ANION GAP SERPL CALC-SCNC: 6 MMOL/L (ref 2–11)
BUN SERPL-MCNC: 23 MG/DL (ref 8–23)
CALCIUM SERPL-MCNC: 10.1 MG/DL (ref 8.3–10.4)
CHLORIDE SERPL-SCNC: 109 MMOL/L (ref 101–110)
CO2 SERPL-SCNC: 25 MMOL/L (ref 21–32)
CREAT SERPL-MCNC: 0.9 MG/DL (ref 0.6–1)
EST. AVERAGE GLUCOSE BLD GHB EST-MCNC: 137 MG/DL
GLUCOSE SERPL-MCNC: 115 MG/DL (ref 65–100)
HBA1C MFR BLD: 6.4 % (ref 4.8–5.6)
POTASSIUM SERPL-SCNC: 4.7 MMOL/L (ref 3.5–5.1)
SODIUM SERPL-SCNC: 140 MMOL/L (ref 133–143)

## 2023-05-13 LAB
ALT SERPL-CCNC: 32 U/L (ref 12–65)
CHOLEST SERPL-MCNC: 176 MG/DL
HDLC SERPL-MCNC: 130 MG/DL (ref 40–60)
HDLC SERPL: 1.4
LDLC SERPL CALC-MCNC: 24.8 MG/DL
TRIGL SERPL-MCNC: 106 MG/DL (ref 35–150)
VLDLC SERPL CALC-MCNC: 21.2 MG/DL (ref 6–23)

## 2023-05-19 ENCOUNTER — OFFICE VISIT (OUTPATIENT)
Dept: INTERNAL MEDICINE CLINIC | Facility: CLINIC | Age: 88
End: 2023-05-19
Payer: MEDICARE

## 2023-05-19 VITALS
WEIGHT: 157 LBS | BODY MASS INDEX: 29.64 KG/M2 | SYSTOLIC BLOOD PRESSURE: 130 MMHG | DIASTOLIC BLOOD PRESSURE: 75 MMHG | HEIGHT: 61 IN

## 2023-05-19 DIAGNOSIS — M51.36 DEGENERATIVE DISC DISEASE, LUMBAR: ICD-10-CM

## 2023-05-19 DIAGNOSIS — Z95.0 PACEMAKER: ICD-10-CM

## 2023-05-19 DIAGNOSIS — I10 ESSENTIAL HYPERTENSION WITH GOAL BLOOD PRESSURE LESS THAN 130/80: ICD-10-CM

## 2023-05-19 DIAGNOSIS — Z85.038 HX OF MALIGNANT NEOPLASM OF COLON: ICD-10-CM

## 2023-05-19 DIAGNOSIS — Z85.6 HX OF CHRONIC LYMPHOCYTIC LEUKEMIA: Primary | ICD-10-CM

## 2023-05-19 PROBLEM — R00.1 BRADYCARDIA: Status: RESOLVED | Noted: 2019-08-23 | Resolved: 2023-05-19

## 2023-05-19 PROBLEM — R42 EPISODE OF DIZZINESS: Status: RESOLVED | Noted: 2022-11-18 | Resolved: 2023-05-19

## 2023-05-19 PROBLEM — E83.52 HYPERCALCEMIA: Status: RESOLVED | Noted: 2021-07-01 | Resolved: 2023-05-19

## 2023-05-19 PROBLEM — R73.9 HYPERGLYCEMIA: Status: RESOLVED | Noted: 2017-03-16 | Resolved: 2023-05-19

## 2023-05-19 PROBLEM — R19.7 DIARRHEA OF PRESUMED INFECTIOUS ORIGIN: Status: RESOLVED | Noted: 2017-08-30 | Resolved: 2023-05-19

## 2023-05-19 PROCEDURE — G8417 CALC BMI ABV UP PARAM F/U: HCPCS | Performed by: INTERNAL MEDICINE

## 2023-05-19 PROCEDURE — 99214 OFFICE O/P EST MOD 30 MIN: CPT | Performed by: INTERNAL MEDICINE

## 2023-05-19 PROCEDURE — 1123F ACP DISCUSS/DSCN MKR DOCD: CPT | Performed by: INTERNAL MEDICINE

## 2023-05-19 PROCEDURE — G8427 DOCREV CUR MEDS BY ELIG CLIN: HCPCS | Performed by: INTERNAL MEDICINE

## 2023-05-19 PROCEDURE — 1090F PRES/ABSN URINE INCON ASSESS: CPT | Performed by: INTERNAL MEDICINE

## 2023-05-19 PROCEDURE — 1036F TOBACCO NON-USER: CPT | Performed by: INTERNAL MEDICINE

## 2023-05-19 SDOH — ECONOMIC STABILITY: HOUSING INSECURITY
IN THE LAST 12 MONTHS, WAS THERE A TIME WHEN YOU DID NOT HAVE A STEADY PLACE TO SLEEP OR SLEPT IN A SHELTER (INCLUDING NOW)?: NO

## 2023-05-19 SDOH — ECONOMIC STABILITY: INCOME INSECURITY: HOW HARD IS IT FOR YOU TO PAY FOR THE VERY BASICS LIKE FOOD, HOUSING, MEDICAL CARE, AND HEATING?: NOT HARD AT ALL

## 2023-05-19 SDOH — ECONOMIC STABILITY: FOOD INSECURITY: WITHIN THE PAST 12 MONTHS, THE FOOD YOU BOUGHT JUST DIDN'T LAST AND YOU DIDN'T HAVE MONEY TO GET MORE.: NEVER TRUE

## 2023-05-19 SDOH — ECONOMIC STABILITY: FOOD INSECURITY: WITHIN THE PAST 12 MONTHS, YOU WORRIED THAT YOUR FOOD WOULD RUN OUT BEFORE YOU GOT MONEY TO BUY MORE.: NEVER TRUE

## 2023-05-19 ASSESSMENT — PATIENT HEALTH QUESTIONNAIRE - PHQ9
2. FEELING DOWN, DEPRESSED OR HOPELESS: 0
SUM OF ALL RESPONSES TO PHQ QUESTIONS 1-9: 0
1. LITTLE INTEREST OR PLEASURE IN DOING THINGS: 0
SUM OF ALL RESPONSES TO PHQ QUESTIONS 1-9: 0
SUM OF ALL RESPONSES TO PHQ9 QUESTIONS 1 & 2: 0

## 2023-05-19 ASSESSMENT — ENCOUNTER SYMPTOMS: CHEST TIGHTNESS: 0

## 2023-05-19 NOTE — PROGRESS NOTES
She is active in her house, doing sewing and cross stitching, and active in her house. Taking care of her . Past Medical History, Past Surgical History, Family history, Social History, and Medications were all reviewed with the patient today and updated as necessary. Current Outpatient Medications   Medication Sig Dispense Refill    alendronate (FOSAMAX) 70 MG tablet Take 1 tablet by mouth every 7 days 12 tablet 3    atorvastatin (LIPITOR) 10 MG tablet Take 1 tablet by mouth daily 90 tablet 3    losartan (COZAAR) 100 MG tablet Take 1 tablet by mouth daily 90 tablet 3    zinc 50 MG TABS tablet Take 1 tablet by mouth daily      cyanocobalamin 1000 MCG tablet Take 1 tablet by mouth daily      calcium-vitamin D (OSCAL-500) 500-200 MG-UNIT per tablet Take 1 tablet by mouth daily      acetaminophen (TYLENOL) 325 MG tablet Take 2 tablets by mouth every 4 hours as needed      vitamin D 25 MCG (1000 UT) CAPS Take 1 capsule by mouth daily      diclofenac sodium (VOLTAREN) 1 % GEL Apply 4 g topically 4 times daily as needed      ondansetron (ZOFRAN-ODT) 4 MG disintegrating tablet Take 1 tablet by mouth every 8 hours as needed       No current facility-administered medications for this visit.      Allergies   Allergen Reactions    Hydrocodone-Acetaminophen Nausea And Vomiting, Other (See Comments) and Rash    Metformin And Related Diarrhea    Lisinopril Other (See Comments)     Antihypertensives     Patient Active Problem List   Diagnosis    Osteoarthritis of right knee    Sciatica    Osteoporosis    Hypercalcemia    Elevated CEA    Closed nondisplaced fracture of left pubis with routine healing    Osteopenia of multiple sites    Fall    Chronic bilateral low back pain without sciatica    HLD (hyperlipidemia)    Essential hypertension with goal blood pressure less than 130/80    Total knee replacement status    Pacemaker    Disorder of right rotator cuff    Personal history of other malignant neoplasm of

## 2023-06-26 PROCEDURE — 93296 REM INTERROG EVL PM/IDS: CPT | Performed by: INTERNAL MEDICINE

## 2023-06-26 PROCEDURE — 93294 REM INTERROG EVL PM/LDLS PM: CPT | Performed by: INTERNAL MEDICINE

## 2023-09-15 ENCOUNTER — NURSE ONLY (OUTPATIENT)
Age: 88
End: 2023-09-15

## 2023-09-15 DIAGNOSIS — I49.5 SICK SINUS SYNDROME (HCC): Primary | ICD-10-CM

## 2023-10-15 ENCOUNTER — TELEPHONE (OUTPATIENT)
Dept: CARDIOLOGY | Age: 88
End: 2023-10-15

## 2023-10-15 NOTE — TELEPHONE ENCOUNTER
Call received from patient's daughter stating that patient is short of breath and legs/feet are swollen. Patient did not feel well at the beginning of the week, having diarrhea/nausea but that has improved but she remains weak and short of breath. Instructed to come to ED for evaluation.       Geronimo Solis, APRN - CNP

## 2023-10-16 ENCOUNTER — TELEPHONE (OUTPATIENT)
Age: 88
End: 2023-10-16

## 2023-10-16 NOTE — TELEPHONE ENCOUNTER
Pt states she had dark bloody diarrhea within the past 10 days. Stool remains very dark even now. C/o weakness and SOB on exertion. Had some chest pain Saturday and yesterday on exertion. No CP now. Pt has a history of colon cancer and blood in stool. Triage advised pt to notify PCP and if chest pain recurs, proceed to ER for evaluation. Pt verbalizes understanding and agrees to plan. Pt last seen 2021. Triage moved up f/u appt to 10/25/23 at 11:45 at [facility] office.

## 2023-10-16 NOTE — TELEPHONE ENCOUNTER
Patient states she has started having some sob, and some weakness in her legs.   BP has been normal.

## 2023-10-17 NOTE — TELEPHONE ENCOUNTER
Triage informed pt's son, Ana Negro, of appt 10/25/23 at 11:45 at [facility]. He confirms appt date, time, and location. Pt will bring a list of medications to appt.

## 2023-10-18 ENCOUNTER — APPOINTMENT (OUTPATIENT)
Dept: GENERAL RADIOLOGY | Age: 88
End: 2023-10-18
Payer: MEDICARE

## 2023-10-18 ENCOUNTER — HOSPITAL ENCOUNTER (INPATIENT)
Age: 88
LOS: 1 days | Discharge: HOME | End: 2023-10-20
Attending: EMERGENCY MEDICINE | Admitting: FAMILY MEDICINE
Payer: MEDICARE

## 2023-10-18 ENCOUNTER — TELEPHONE (OUTPATIENT)
Age: 88
End: 2023-10-18

## 2023-10-18 DIAGNOSIS — I10 ESSENTIAL HYPERTENSION WITH GOAL BLOOD PRESSURE LESS THAN 130/80: ICD-10-CM

## 2023-10-18 DIAGNOSIS — I20.9 NEW-ONSET ANGINA (HCC): ICD-10-CM

## 2023-10-18 DIAGNOSIS — D64.9 SYMPTOMATIC ANEMIA: Primary | ICD-10-CM

## 2023-10-18 PROBLEM — K92.2 GI BLEED: Status: ACTIVE | Noted: 2023-10-18

## 2023-10-18 PROBLEM — M81.0 OSTEOPOROSIS: Status: ACTIVE | Noted: 2021-03-29

## 2023-10-18 PROBLEM — Z95.0 PACEMAKER: Status: ACTIVE | Noted: 2017-10-10

## 2023-10-18 LAB
ABO + RH BLD: NORMAL
ANION GAP SERPL CALC-SCNC: 8 MMOL/L (ref 2–11)
BASOPHILS # BLD: 0.1 K/UL (ref 0–0.2)
BASOPHILS NFR BLD: 1 % (ref 0–2)
BLOOD GROUP ANTIBODIES SERPL: NORMAL
BUN SERPL-MCNC: 16 MG/DL (ref 8–23)
CALCIUM SERPL-MCNC: 9.9 MG/DL (ref 8.3–10.4)
CHLORIDE SERPL-SCNC: 113 MMOL/L (ref 101–110)
CO2 SERPL-SCNC: 24 MMOL/L (ref 21–32)
CREAT SERPL-MCNC: 0.8 MG/DL (ref 0.6–1)
DIFFERENTIAL METHOD BLD: ABNORMAL
EKG ATRIAL RATE: 69 BPM
EKG DIAGNOSIS: NORMAL
EKG P-R INTERVAL: 188 MS
EKG Q-T INTERVAL: 378 MS
EKG QRS DURATION: 80 MS
EKG QTC CALCULATION (BAZETT): 405 MS
EKG R AXIS: -40 DEGREES
EKG T AXIS: 56 DEGREES
EKG VENTRICULAR RATE: 69 BPM
EOSINOPHIL # BLD: 0.1 K/UL (ref 0–0.8)
EOSINOPHIL NFR BLD: 1 % (ref 0.5–7.8)
ERYTHROCYTE [DISTWIDTH] IN BLOOD BY AUTOMATED COUNT: 13.3 % (ref 11.9–14.6)
FERRITIN SERPL-MCNC: 16 NG/ML (ref 8–388)
FOLATE SERPL-MCNC: 18.5 NG/ML (ref 3.1–17.5)
GLUCOSE SERPL-MCNC: 125 MG/DL (ref 65–100)
HCT VFR BLD AUTO: 29.8 % (ref 35.8–46.3)
HGB BLD-MCNC: 9.2 G/DL (ref 11.7–15.4)
IMM GRANULOCYTES # BLD AUTO: 0 K/UL (ref 0–0.5)
IMM GRANULOCYTES NFR BLD AUTO: 0 % (ref 0–5)
IRON SERPL-MCNC: 22 UG/DL (ref 35–150)
LYMPHOCYTES # BLD: 2.6 K/UL (ref 0.5–4.6)
LYMPHOCYTES NFR BLD: 25 % (ref 13–44)
MCH RBC QN AUTO: 30.3 PG (ref 26.1–32.9)
MCHC RBC AUTO-ENTMCNC: 30.9 G/DL (ref 31.4–35)
MCV RBC AUTO: 98 FL (ref 82–102)
MONOCYTES # BLD: 0.5 K/UL (ref 0.1–1.3)
MONOCYTES NFR BLD: 5 % (ref 4–12)
NEUTS SEG # BLD: 7.1 K/UL (ref 1.7–8.2)
NEUTS SEG NFR BLD: 68 % (ref 43–78)
NRBC # BLD: 0.02 K/UL (ref 0–0.2)
NT PRO BNP: 646 PG/ML
PLATELET # BLD AUTO: 293 K/UL (ref 150–450)
PMV BLD AUTO: 10.6 FL (ref 9.4–12.3)
POTASSIUM SERPL-SCNC: 4 MMOL/L (ref 3.5–5.1)
RBC # BLD AUTO: 3.04 M/UL (ref 4.05–5.2)
SODIUM SERPL-SCNC: 145 MMOL/L (ref 133–143)
SPECIMEN EXP DATE BLD: NORMAL
TROPONIN I SERPL HS-MCNC: 22.5 PG/ML (ref 0–14)
TROPONIN I SERPL HS-MCNC: 25.3 PG/ML (ref 0–14)
VIT B12 SERPL-MCNC: 720 PG/ML (ref 193–986)
WBC # BLD AUTO: 10.4 K/UL (ref 4.3–11.1)

## 2023-10-18 PROCEDURE — 1100000003 HC PRIVATE W/ TELEMETRY

## 2023-10-18 PROCEDURE — 2580000003 HC RX 258: Performed by: HOSPITALIST

## 2023-10-18 PROCEDURE — 82746 ASSAY OF FOLIC ACID SERUM: CPT

## 2023-10-18 PROCEDURE — 6360000002 HC RX W HCPCS: Performed by: HOSPITALIST

## 2023-10-18 PROCEDURE — C9113 INJ PANTOPRAZOLE SODIUM, VIA: HCPCS | Performed by: HOSPITALIST

## 2023-10-18 PROCEDURE — 6370000000 HC RX 637 (ALT 250 FOR IP): Performed by: EMERGENCY MEDICINE

## 2023-10-18 PROCEDURE — 71046 X-RAY EXAM CHEST 2 VIEWS: CPT

## 2023-10-18 PROCEDURE — 86901 BLOOD TYPING SEROLOGIC RH(D): CPT

## 2023-10-18 PROCEDURE — 84484 ASSAY OF TROPONIN QUANT: CPT

## 2023-10-18 PROCEDURE — 85025 COMPLETE CBC W/AUTO DIFF WBC: CPT

## 2023-10-18 PROCEDURE — 93010 ELECTROCARDIOGRAM REPORT: CPT | Performed by: INTERNAL MEDICINE

## 2023-10-18 PROCEDURE — 86850 RBC ANTIBODY SCREEN: CPT

## 2023-10-18 PROCEDURE — 80048 BASIC METABOLIC PNL TOTAL CA: CPT

## 2023-10-18 PROCEDURE — 83540 ASSAY OF IRON: CPT

## 2023-10-18 PROCEDURE — 82607 VITAMIN B-12: CPT

## 2023-10-18 PROCEDURE — 82728 ASSAY OF FERRITIN: CPT

## 2023-10-18 PROCEDURE — 93005 ELECTROCARDIOGRAM TRACING: CPT | Performed by: EMERGENCY MEDICINE

## 2023-10-18 PROCEDURE — A4216 STERILE WATER/SALINE, 10 ML: HCPCS | Performed by: HOSPITALIST

## 2023-10-18 PROCEDURE — 86900 BLOOD TYPING SEROLOGIC ABO: CPT

## 2023-10-18 PROCEDURE — 99285 EMERGENCY DEPT VISIT HI MDM: CPT

## 2023-10-18 PROCEDURE — 94762 N-INVAS EAR/PLS OXIMTRY CONT: CPT

## 2023-10-18 PROCEDURE — 83880 ASSAY OF NATRIURETIC PEPTIDE: CPT

## 2023-10-18 RX ORDER — ACETAMINOPHEN 325 MG/1
650 TABLET ORAL EVERY 6 HOURS PRN
Status: DISCONTINUED | OUTPATIENT
Start: 2023-10-18 | End: 2023-10-20 | Stop reason: HOSPADM

## 2023-10-18 RX ORDER — ACETAMINOPHEN 650 MG/1
650 SUPPOSITORY RECTAL EVERY 6 HOURS PRN
Status: DISCONTINUED | OUTPATIENT
Start: 2023-10-18 | End: 2023-10-20 | Stop reason: HOSPADM

## 2023-10-18 RX ORDER — ONDANSETRON 2 MG/ML
4 INJECTION INTRAMUSCULAR; INTRAVENOUS EVERY 6 HOURS PRN
Status: DISCONTINUED | OUTPATIENT
Start: 2023-10-18 | End: 2023-10-20 | Stop reason: HOSPADM

## 2023-10-18 RX ORDER — ONDANSETRON 4 MG/1
4 TABLET, ORALLY DISINTEGRATING ORAL EVERY 8 HOURS PRN
Status: DISCONTINUED | OUTPATIENT
Start: 2023-10-18 | End: 2023-10-20 | Stop reason: HOSPADM

## 2023-10-18 RX ORDER — SODIUM CHLORIDE 9 MG/ML
INJECTION, SOLUTION INTRAVENOUS PRN
Status: DISCONTINUED | OUTPATIENT
Start: 2023-10-18 | End: 2023-10-20 | Stop reason: HOSPADM

## 2023-10-18 RX ORDER — SODIUM CHLORIDE 0.9 % (FLUSH) 0.9 %
5-40 SYRINGE (ML) INJECTION EVERY 12 HOURS SCHEDULED
Status: DISCONTINUED | OUTPATIENT
Start: 2023-10-18 | End: 2023-10-20 | Stop reason: HOSPADM

## 2023-10-18 RX ORDER — SODIUM CHLORIDE 0.9 % (FLUSH) 0.9 %
5-40 SYRINGE (ML) INJECTION PRN
Status: DISCONTINUED | OUTPATIENT
Start: 2023-10-18 | End: 2023-10-20 | Stop reason: HOSPADM

## 2023-10-18 RX ORDER — ASPIRIN 81 MG/1
324 TABLET, CHEWABLE ORAL
Status: COMPLETED | OUTPATIENT
Start: 2023-10-18 | End: 2023-10-18

## 2023-10-18 RX ORDER — LOSARTAN POTASSIUM 50 MG/1
100 TABLET ORAL DAILY
Status: DISCONTINUED | OUTPATIENT
Start: 2023-10-19 | End: 2023-10-20 | Stop reason: HOSPADM

## 2023-10-18 RX ORDER — POLYETHYLENE GLYCOL 3350 17 G/17G
17 POWDER, FOR SOLUTION ORAL DAILY PRN
Status: DISCONTINUED | OUTPATIENT
Start: 2023-10-18 | End: 2023-10-20 | Stop reason: HOSPADM

## 2023-10-18 RX ORDER — ATORVASTATIN CALCIUM 20 MG/1
10 TABLET, FILM COATED ORAL DAILY
Status: DISCONTINUED | OUTPATIENT
Start: 2023-10-19 | End: 2023-10-20 | Stop reason: HOSPADM

## 2023-10-18 RX ADMIN — SODIUM CHLORIDE, PRESERVATIVE FREE 10 ML: 5 INJECTION INTRAVENOUS at 21:59

## 2023-10-18 RX ADMIN — ASPIRIN 324 MG: 81 TABLET, CHEWABLE ORAL at 17:32

## 2023-10-18 RX ADMIN — NITROGLYCERIN 0.5 INCH: 20 OINTMENT TOPICAL at 17:32

## 2023-10-18 RX ADMIN — SODIUM CHLORIDE 40 MG: 9 INJECTION INTRAMUSCULAR; INTRAVENOUS; SUBCUTANEOUS at 22:50

## 2023-10-18 ASSESSMENT — PAIN SCALES - GENERAL: PAINLEVEL_OUTOF10: 7

## 2023-10-18 ASSESSMENT — ENCOUNTER SYMPTOMS
DIARRHEA: 0
COLOR CHANGE: 0
SHORTNESS OF BREATH: 1
CHEST TIGHTNESS: 1
COUGH: 0
BACK PAIN: 0
NAUSEA: 0
ABDOMINAL PAIN: 0
RHINORRHEA: 0
VOMITING: 0

## 2023-10-18 ASSESSMENT — PAIN DESCRIPTION - PAIN TYPE: TYPE: ACUTE PAIN

## 2023-10-18 ASSESSMENT — PAIN DESCRIPTION - FREQUENCY: FREQUENCY: CONTINUOUS

## 2023-10-18 ASSESSMENT — LIFESTYLE VARIABLES
HOW MANY STANDARD DRINKS CONTAINING ALCOHOL DO YOU HAVE ON A TYPICAL DAY: PATIENT DOES NOT DRINK
HOW OFTEN DO YOU HAVE A DRINK CONTAINING ALCOHOL: NEVER

## 2023-10-18 ASSESSMENT — PAIN - FUNCTIONAL ASSESSMENT: PAIN_FUNCTIONAL_ASSESSMENT: 0-10

## 2023-10-18 ASSESSMENT — PAIN DESCRIPTION - DESCRIPTORS: DESCRIPTORS: ACHING;PRESSURE

## 2023-10-18 ASSESSMENT — PAIN DESCRIPTION - ORIENTATION: ORIENTATION: LEFT

## 2023-10-18 ASSESSMENT — PAIN DESCRIPTION - LOCATION: LOCATION: CHEST

## 2023-10-18 NOTE — TELEPHONE ENCOUNTER
Pt's son, Rayray Loyd states Pt has had 6# wt gain. BLE edema, more SOB, WERNER. PCP advised if pt cannot see [facility] today or tomorrow, proceed to ER. Noted call Mon 10/16/23 reporting SOB, bloody diarrhea, intermittent CP. Rayray Loyd states pt also c/o L arm tingling, L hip issues, dizziness, wobbly. Informed Rayray Loyd could get pt in on Fri but sx sound too complex for OV. Explained benefit of ER visit, more thorough eval, can cover more areas in shorter time, not delay tx, monitor labs, IV diuretics, prn. Rayray Loyd voiced understanding and asks if ok to wait until AM?  Advise ER now. Rayray Loyd voiced understanding and agreement with POC. Will keep 10/25/23 Fu on the schedule, prn. Rayray Loyd agrees, voices thanks.   cgh

## 2023-10-18 NOTE — ED PROVIDER NOTES
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 years ago        Past Surgical History:   Procedure Laterality Date    CARPAL TUNNEL RELEASE Left 04/01/2022    CATARACT REMOVAL Bilateral 2018    COLONOSCOPY N/A 2/28/2018    COLONOSCOPY  BMI 28 performed by Shilpi Reilly MD at [address] [place] [address] Road      and reversal    GI      rectocele    HEENT      Eye Surgery -Detached retina- Left    HERNIA REPAIR  12/2010    HYSTERECTOMY (CERVIX STATUS UNKNOWN)      ORTHOPEDIC SURGERY Bilateral 6-2008    Hand Surgery Bilateral    ORTHOPEDIC SURGERY Right 10/2014    Achilles tendon repair    ORTHOPEDIC SURGERY  2010    knee surgery    OVARY REMOVAL      PACEMAKER  10/10/2017    Biotronik, dualchamber    LA UNLISTED PROCEDURE ABDOMEN PERITONEUM & OMENTUM  7/07    exploratory    REPAIR OF RECTOCELE      TOTAL COLECTOMY      TUMOR REMOVAL  2007    Colorectal Malignancy, in remission, chemo x6 month        Social History     Socioeconomic History    Marital status:      Spouse name: None    Number of children: None    Years of education: None    Highest education level: None   Tobacco Use    Smoking status: Never    Smokeless tobacco: Never   Substance and Sexual Activity    Alcohol use: No    Drug use: No   Social History Narrative     and active, caring for her , walks with a cane,    Children help some and  helps     Social Determinants of Health     Financial Resource Strain: Low Risk  (5/19/2023)    Overall Financial Resource Strain (CARDIA)     Difficulty of Paying Living Expenses: Not hard at all   Food Insecurity: No Food Insecurity (5/19/2023)    Hunger Vital Sign     Worried About Running Out of Food in the Last Year: Never true     Ran Out of Food in the Last Year: Never true   Transportation Needs: Unknown (5/19/2023)    PRAPARE - Transportation     Lack of Transportation (Non-Medical): No   Physical Activity: Insufficiently Active (11/11/2022)    Exercise Vital Sign     Days of Exercise per Week: 5 days     Minutes of Exercise per Session: 10 min   Housing Stability: Unknown (5/19/2023)    Housing Stability Vital Sign     Unstable Housing in the Last Year: No        Previous Medications    ACETAMINOPHEN (TYLENOL) 325 MG TABLET    Take 2 tablets by mouth every 4 hours as needed    ALENDRONATE (FOSAMAX) 70 MG TABLET    Take 1 tablet by mouth every 7 days    ATORVASTATIN (LIPITOR) 10 MG TABLET    Take 1 tablet by mouth daily    CALCIUM-VITAMIN D (OSCAL-500) 500-200 MG-UNIT PER TABLET    Take 1 tablet by mouth daily    CYANOCOBALAMIN 1000 MCG TABLET    Take 1 tablet by mouth daily    DICLOFENAC SODIUM (VOLTAREN) 1 % GEL    Apply 4 g topically 4 times daily as needed    LOSARTAN (COZAAR) 100 MG TABLET    Take 1 tablet by mouth daily    ONDANSETRON (ZOFRAN-ODT) 4 MG DISINTEGRATING TABLET    Take 1 tablet by mouth every 8 hours as needed    VITAMIN D 25 MCG (1000 UT) CAPS    Take 1 capsule by mouth daily    ZINC 50 MG TABS TABLET    Take 1 tablet by mouth daily        Results for orders placed or performed during the hospital encounter of 10/18/23   XR CHEST (2 VW)    Narrative    CHEST X-RAY, 2 views 10/18/2023    History: Nausea and dyspnea on exertion impression the left chest starting this  week and radiating down left arm. Tightness in back. Technique: PA and lateral views of the chest.     Comparison: Chest x-ray 1/12/2019    Findings: There is a grossly stable left-sided intracardiac device. The cardiac silhouette  is mildly enlarged although grossly unchanged.  A grossly

## 2023-10-18 NOTE — TELEPHONE ENCOUNTER
Pt son called and stated that he knows that an appt was already made for wednesdsay 10/25/23. Pt just left PCP and they stated they want her to get seen asap. Son stated that doctor would not recommend her waiting till 10/25/23 to get seen by her cardiologist. Patient walks a few feet and becomes SOB. Usually bounching around and has a ton of energy. Had episode Saturday where she could not catch her breath. Current symptoms SOB, dizziness, fatigue. Please call and advise.

## 2023-10-18 NOTE — ED TRIAGE NOTES
Pt was at PCP today and referred to ED for symptoms of vertigo and rectal bleeding 1wk ago now nausea with dyspnea on exertion and pressure to L chest started this weekend radiating down L arm and tightness in back.   (+)BLE swelling x5days  A&Ox4

## 2023-10-19 ENCOUNTER — APPOINTMENT (OUTPATIENT)
Dept: NON INVASIVE DIAGNOSTICS | Age: 88
End: 2023-10-19
Attending: HOSPITALIST
Payer: MEDICARE

## 2023-10-19 PROBLEM — R06.09 DYSPNEA ON EXERTION: Status: ACTIVE | Noted: 2023-10-19

## 2023-10-19 PROBLEM — I50.31 ACUTE DIASTOLIC HEART FAILURE (HCC): Status: ACTIVE | Noted: 2023-10-19

## 2023-10-19 LAB
ANION GAP SERPL CALC-SCNC: 4 MMOL/L (ref 2–11)
BUN SERPL-MCNC: 11 MG/DL (ref 8–23)
CALCIUM SERPL-MCNC: 9.2 MG/DL (ref 8.3–10.4)
CHLORIDE SERPL-SCNC: 113 MMOL/L (ref 101–110)
CO2 SERPL-SCNC: 27 MMOL/L (ref 21–32)
CREAT SERPL-MCNC: 0.7 MG/DL (ref 0.6–1)
ECHO AO ASC DIAM: 2.8 CM
ECHO AO ASCENDING AORTA INDEX: 1.6 CM/M2
ECHO AO ROOT DIAM: 2.6 CM
ECHO AO ROOT INDEX: 1.49 CM/M2
ECHO AV AREA PEAK VELOCITY: 1.5 CM2
ECHO AV AREA VTI: 1.5 CM2
ECHO AV AREA/BSA PEAK VELOCITY: 0.9 CM2/M2
ECHO AV AREA/BSA VTI: 0.9 CM2/M2
ECHO AV MEAN GRADIENT: 15 MMHG
ECHO AV MEAN VELOCITY: 1.9 M/S
ECHO AV PEAK GRADIENT: 27 MMHG
ECHO AV PEAK VELOCITY: 2.6 M/S
ECHO AV VELOCITY RATIO: 0.46
ECHO AV VTI: 56.6 CM
ECHO BSA: 1.81 M2
ECHO EST RA PRESSURE: 3 MMHG
ECHO IVC PROX: 2 CM
ECHO LA AREA 2C: 16.4 CM2
ECHO LA AREA 4C: 19.7 CM2
ECHO LA DIAMETER INDEX: 1.71 CM/M2
ECHO LA DIAMETER: 3 CM
ECHO LA MAJOR AXIS: 5.6 CM
ECHO LA MINOR AXIS: 5.3 CM
ECHO LA TO AORTIC ROOT RATIO: 1.15
ECHO LA VOL 2C: 41 ML (ref 22–52)
ECHO LA VOL 4C: 57 ML (ref 22–52)
ECHO LA VOL BP: 50 ML (ref 22–52)
ECHO LA VOL/BSA BIPLANE: 29 ML/M2 (ref 16–34)
ECHO LA VOLUME INDEX A2C: 23 ML/M2 (ref 16–34)
ECHO LA VOLUME INDEX A4C: 33 ML/M2 (ref 16–34)
ECHO LV E' LATERAL VELOCITY: 9 CM/S
ECHO LV E' SEPTAL VELOCITY: 6 CM/S
ECHO LV EDV A4C: 51 ML
ECHO LV EDV INDEX A4C: 29 ML/M2
ECHO LV EJECTION FRACTION A4C: 64 %
ECHO LV ESV A4C: 18 ML
ECHO LV ESV INDEX A4C: 10 ML/M2
ECHO LV FRACTIONAL SHORTENING: 29 % (ref 28–44)
ECHO LV INTERNAL DIMENSION DIASTOLE INDEX: 1.77 CM/M2
ECHO LV INTERNAL DIMENSION DIASTOLIC: 3.1 CM (ref 3.9–5.3)
ECHO LV INTERNAL DIMENSION SYSTOLIC INDEX: 1.26 CM/M2
ECHO LV INTERNAL DIMENSION SYSTOLIC: 2.2 CM
ECHO LV IVSD: 1 CM (ref 0.6–0.9)
ECHO LV MASS 2D: 79.8 G (ref 67–162)
ECHO LV MASS INDEX 2D: 45.6 G/M2 (ref 43–95)
ECHO LV POSTERIOR WALL DIASTOLIC: 0.9 CM (ref 0.6–0.9)
ECHO LV RELATIVE WALL THICKNESS RATIO: 0.58
ECHO LVOT AREA: 3.1 CM2
ECHO LVOT AV VTI INDEX: 0.49
ECHO LVOT DIAM: 2 CM
ECHO LVOT MEAN GRADIENT: 4 MMHG
ECHO LVOT PEAK GRADIENT: 6 MMHG
ECHO LVOT PEAK VELOCITY: 1.2 M/S
ECHO LVOT STROKE VOLUME INDEX: 49.3 ML/M2
ECHO LVOT SV: 86.4 ML
ECHO LVOT VTI: 27.5 CM
ECHO MV A VELOCITY: 1.41 M/S
ECHO MV AREA VTI: 1.5 CM2
ECHO MV E DECELERATION TIME (DT): 257 MS
ECHO MV E VELOCITY: 1.29 M/S
ECHO MV E/A RATIO: 0.91
ECHO MV E/E' LATERAL: 14.33
ECHO MV E/E' RATIO (AVERAGED): 17.92
ECHO MV E/E' SEPTAL: 21.5
ECHO MV LVOT VTI INDEX: 2.07
ECHO MV MAX VELOCITY: 1.7 M/S
ECHO MV MEAN GRADIENT: 6 MMHG
ECHO MV MEAN VELOCITY: 1.1 M/S
ECHO MV PEAK GRADIENT: 12 MMHG
ECHO MV VTI: 56.9 CM
ECHO PV ACCELERATION TIME (AT): 106 MS
ECHO PV MAX VELOCITY: 1.2 M/S
ECHO PV PEAK GRADIENT: 6 MMHG
ECHO RIGHT VENTRICULAR SYSTOLIC PRESSURE (RVSP): 39 MMHG
ECHO RV BASAL DIMENSION: 3 CM
ECHO RV FREE WALL PEAK S': 13 CM/S
ECHO RV INTERNAL DIMENSION: 2.4 CM
ECHO RV TAPSE: 1.7 CM (ref 1.7–?)
ECHO TV REGURGITANT MAX VELOCITY: 2.98 M/S
ECHO TV REGURGITANT PEAK GRADIENT: 36 MMHG
GLUCOSE SERPL-MCNC: 107 MG/DL (ref 65–100)
HCT VFR BLD AUTO: 27.1 % (ref 35.8–46.3)
HCT VFR BLD AUTO: 29.9 % (ref 35.8–46.3)
HGB BLD-MCNC: 8.4 G/DL (ref 11.7–15.4)
HGB BLD-MCNC: 9.4 G/DL (ref 11.7–15.4)
MAGNESIUM SERPL-MCNC: 1.5 MG/DL (ref 1.8–2.4)
POTASSIUM SERPL-SCNC: 3.5 MMOL/L (ref 3.5–5.1)
SODIUM SERPL-SCNC: 144 MMOL/L (ref 133–143)
TROPONIN I SERPL HS-MCNC: 24.4 PG/ML (ref 0–14)

## 2023-10-19 PROCEDURE — 6370000000 HC RX 637 (ALT 250 FOR IP): Performed by: HOSPITALIST

## 2023-10-19 PROCEDURE — 2580000003 HC RX 258: Performed by: HOSPITALIST

## 2023-10-19 PROCEDURE — 80048 BASIC METABOLIC PNL TOTAL CA: CPT

## 2023-10-19 PROCEDURE — G0378 HOSPITAL OBSERVATION PER HR: HCPCS | Performed by: PHYSICIAN ASSISTANT

## 2023-10-19 PROCEDURE — 84484 ASSAY OF TROPONIN QUANT: CPT

## 2023-10-19 PROCEDURE — 85014 HEMATOCRIT: CPT

## 2023-10-19 PROCEDURE — 85018 HEMOGLOBIN: CPT

## 2023-10-19 PROCEDURE — 6360000002 HC RX W HCPCS: Performed by: PHYSICIAN ASSISTANT

## 2023-10-19 PROCEDURE — 93306 TTE W/DOPPLER COMPLETE: CPT | Performed by: INTERNAL MEDICINE

## 2023-10-19 PROCEDURE — 99221 1ST HOSP IP/OBS SF/LOW 40: CPT | Performed by: INTERNAL MEDICINE

## 2023-10-19 PROCEDURE — 36415 COLL VENOUS BLD VENIPUNCTURE: CPT

## 2023-10-19 PROCEDURE — 6370000000 HC RX 637 (ALT 250 FOR IP): Performed by: INTERNAL MEDICINE

## 2023-10-19 PROCEDURE — G0378 HOSPITAL OBSERVATION PER HR: HCPCS

## 2023-10-19 PROCEDURE — A4216 STERILE WATER/SALINE, 10 ML: HCPCS | Performed by: HOSPITALIST

## 2023-10-19 PROCEDURE — 6360000002 HC RX W HCPCS: Performed by: HOSPITALIST

## 2023-10-19 PROCEDURE — C9113 INJ PANTOPRAZOLE SODIUM, VIA: HCPCS | Performed by: HOSPITALIST

## 2023-10-19 PROCEDURE — 6370000000 HC RX 637 (ALT 250 FOR IP): Performed by: PHYSICIAN ASSISTANT

## 2023-10-19 PROCEDURE — 83735 ASSAY OF MAGNESIUM: CPT

## 2023-10-19 PROCEDURE — 93306 TTE W/DOPPLER COMPLETE: CPT

## 2023-10-19 RX ORDER — CARVEDILOL 3.12 MG/1
3.12 TABLET ORAL 2 TIMES DAILY WITH MEALS
Status: DISCONTINUED | OUTPATIENT
Start: 2023-10-19 | End: 2023-10-20 | Stop reason: HOSPADM

## 2023-10-19 RX ORDER — POTASSIUM CHLORIDE 750 MG/1
10 TABLET, EXTENDED RELEASE ORAL DAILY
Status: DISCONTINUED | OUTPATIENT
Start: 2023-10-19 | End: 2023-10-20 | Stop reason: HOSPADM

## 2023-10-19 RX ORDER — DIMETHICONE, CAMPHOR (SYNTHETIC), MENTHOL, AND PHENOL 1.1; .5; .625; .5 G/100G; G/100G; G/100G; G/100G
OINTMENT TOPICAL PRN
Status: DISCONTINUED | OUTPATIENT
Start: 2023-10-19 | End: 2023-10-20 | Stop reason: HOSPADM

## 2023-10-19 RX ORDER — PANTOPRAZOLE SODIUM 40 MG/1
40 TABLET, DELAYED RELEASE ORAL
Status: DISCONTINUED | OUTPATIENT
Start: 2023-10-19 | End: 2023-10-20 | Stop reason: HOSPADM

## 2023-10-19 RX ORDER — LANOLIN ALCOHOL/MO/W.PET/CERES
400 CREAM (GRAM) TOPICAL DAILY
Status: DISCONTINUED | OUTPATIENT
Start: 2023-10-20 | End: 2023-10-20 | Stop reason: HOSPADM

## 2023-10-19 RX ORDER — MAGNESIUM SULFATE IN WATER 40 MG/ML
2000 INJECTION, SOLUTION INTRAVENOUS ONCE
Status: COMPLETED | OUTPATIENT
Start: 2023-10-19 | End: 2023-10-19

## 2023-10-19 RX ORDER — FUROSEMIDE 40 MG/1
20 TABLET ORAL DAILY
Status: DISCONTINUED | OUTPATIENT
Start: 2023-10-19 | End: 2023-10-20 | Stop reason: HOSPADM

## 2023-10-19 RX ADMIN — SODIUM CHLORIDE, PRESERVATIVE FREE 20 ML: 5 INJECTION INTRAVENOUS at 21:02

## 2023-10-19 RX ADMIN — Medication: at 13:40

## 2023-10-19 RX ADMIN — LOSARTAN POTASSIUM 100 MG: 50 TABLET, FILM COATED ORAL at 08:49

## 2023-10-19 RX ADMIN — POTASSIUM CHLORIDE 10 MEQ: 750 TABLET, EXTENDED RELEASE ORAL at 13:40

## 2023-10-19 RX ADMIN — PANTOPRAZOLE SODIUM 40 MG: 40 TABLET, DELAYED RELEASE ORAL at 17:14

## 2023-10-19 RX ADMIN — SODIUM CHLORIDE, PRESERVATIVE FREE 10 ML: 5 INJECTION INTRAVENOUS at 08:50

## 2023-10-19 RX ADMIN — MAGNESIUM SULFATE HEPTAHYDRATE 2000 MG: 40 INJECTION, SOLUTION INTRAVENOUS at 09:52

## 2023-10-19 RX ADMIN — SODIUM CHLORIDE 40 MG: 9 INJECTION INTRAMUSCULAR; INTRAVENOUS; SUBCUTANEOUS at 09:51

## 2023-10-19 RX ADMIN — ATORVASTATIN CALCIUM 10 MG: 20 TABLET, FILM COATED ORAL at 08:49

## 2023-10-19 RX ADMIN — FUROSEMIDE 20 MG: 40 TABLET ORAL at 13:40

## 2023-10-19 RX ADMIN — CARVEDILOL 3.12 MG: 3.12 TABLET, FILM COATED ORAL at 17:13

## 2023-10-19 ASSESSMENT — PAIN SCALES - GENERAL
PAINLEVEL_OUTOF10: 0
PAINLEVEL_OUTOF10: 0

## 2023-10-19 NOTE — CARE COORDINATION
CM spoke to patient at bedside on this day. Patient confirmed demographic information. Patient reports that she lives with her spouse in a double wide trailer and has 3 steps to enter home with handrails. Patient reports that she is independent with ADLs, uses a cane, and drives. Patient reports that she has no home care services. Patient confirmed PCP information and last PCP visit was 3 months ago. Patient is agreeable to return home when medically stable for discharge and stated that a family member will transport her home. No CM needs voiced or noted at this time. CM will continue to follow patient for any discharge planning needs.     ASSESSMENT NOTE    Attending Physician: Reji Console, DO  Admit Problem: New-onset angina ([address]) [I20.9]  Symptomatic anemia [D64.9]  Date/Time of Admission: 10/18/2023  4:25 PM  Problem List:  Patient Active Problem List   Diagnosis    Osteoarthritis of right knee    Sciatica    Osteoporosis    Elevated CEA    Closed nondisplaced fracture of left pubis with routine healing    Osteopenia of multiple sites    Fall    Chronic bilateral low back pain without sciatica    HLD (hyperlipidemia)    Essential hypertension with goal blood pressure less than 130/80    Total knee replacement status    Pacemaker    Disorder of right rotator cuff    Personal history of other malignant neoplasm of rectum, rectosigmoid junction, and anus    Vitamin D deficiency    Hx of malignant neoplasm of colon    Osteopenia, senile    Pure hypercholesterolemia, unspecified    Anemia    Symptomatic anemia    GI bleed       Service Assessment  Patient Orientation (P) Alert and Oriented   Cognition (P) Alert   History Provided By Cedric Rogers Patient   Primary Caregiver (P) Self   Accompanied By/Relationship     Support Systems (P) Spouse/Significant Other, [address] is: (P) Legal Next of Kin   PCP Verified by CM (P) Yes (confirmed PCP is Dr. Hank Blank)   Last Visit to PCP Yes     Documentation for Discharge Appeal  Discharge Appealed by     Date notified by QIO of appeal request:     Time notified by QIO of appeal request:     Detailed Notice of Discharge given to:     Date Notice of Discharge given:     Time Notice of Discharge given:     Date records sent to [address] [place] [address]     Time records sent to [address] [place] [address]     Date Notified of Outcome     Time Notified of Outcome     Outcome of appeal           Piyush Mi 10/19/23 10:26 AM

## 2023-10-19 NOTE — PROGRESS NOTES
Hospitalist Progress Note   Admit Date:  10/18/2023  4:25 PM   Name:  Aaron Hensley   Age:  80 y.o. Sex:  female  :  10/13/1934   MRN:  [id number]   Room:  [facility]    Presenting/Chief Complaint: Shortness of Breath, Chest Pain, and Dizziness     Reason(s) for Admission: New-onset angina ([address]) [I20.9]  Symptomatic anemia [D64.9]     Hospital Course:   Aaron Hensley is a 80 y.o. female with medical history of pacemaker, colon cancer who presented with dyspnea on-going for 1 week. States she has gained 7 lbs in one week after drinking a of water when she had a presumable GI stomach bug resulting in diarrhea and bloody bowel movements. Diarrhea and hematochezia has resolved. In the ER she presented with a clear CXR with mild cardiomegaly. Hgb 9.2 down from 13 one year ago. Hgb has then down trended to 8.4. She was not hypoxic. GI consulted  Echo ordered and is pending    Did not get lasix or blood on admission    Subjective & 24hr Events:   Feeling better today despite little to no treatment. 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 daily plus KCl 10 mEq daily and magnesium oxide oral beginning tomorrow     Hypomagnesemia  - 2 g IV ordered we will start for a tomorrow      PT/OT evals and PPD needed/ordered?   No  Diet:  Diet NPO  VTE prophylaxis: SCD  Code status: Full Code      Non-peripheral Lines and Tubes

## 2023-10-19 NOTE — ED NOTES
TRANSFER - OUT REPORT:    Verbal report given to Maribell Flores on [employer] [facility]  being transferred to [phone] for routine progression of patient care       Report consisted of patient's Situation, Background, Assessment and   Recommendations(SBAR). Information from the following report(s) Nurse Handoff Report, Index, ED Encounter Summary, and Event Log was reviewed with the receiving nurse. [place] Fall Assessment:    Presents to emergency department  because of falls (Syncope, seizure, or loss of consciousness): No  Age > 70: Yes  Altered Mental Status, Intoxication with alcohol or substance confusion (Disorientation, impaired judgment, poor safety awaremess, or inability to follow instructions): No  Impaired Mobility: Ambulates or transfers with assistive devices or assistance; Unable to ambulate or transer.: Yes  Nursing Judgement: Yes          Lines:   Peripheral IV 10/18/23 Right Forearm (Active)   Site Assessment Clean, dry & intact 10/18/23 1643   Line Status Blood return noted; Flushed 10/18/23 1643   Phlebitis Assessment No symptoms 10/18/23 1643   Infiltration Assessment 0 10/18/23 1643   Alcohol Cap Used No 10/18/23 1643   Dressing Status New dressing applied 10/18/23 1643   Dressing Type Transparent 10/18/23 1643   Dressing Intervention New 10/18/23 1643        Opportunity for questions and clarification was provided.       Patient transported with:  Monitor and Registered Nurse          Heber Cano RN  10/18/23 5193

## 2023-10-19 NOTE — CONSENT
Informed Consent for Blood Component Transfusion Note    I have discussed with the patient and daughter the rationale for blood component transfusion; its benefits in treating or preventing fatigue, organ damage, or death; and its risk which includes mild transfusion reactions, rare risk of blood borne infection, or more serious but rare reactions. I have discussed the alternatives to transfusion, including the risk and consequences of not receiving transfusion. The patient had an opportunity to ask questions and had agreed to proceed with transfusion of blood components.     Electronically signed by Jono De León MD on 10/18/23 at 11:48 PM EDT

## 2023-10-19 NOTE — PROGRESS NOTES
Spiritual Care Visit, initial visit. Visited with patient and son at bedside. Prayed for patient's healing and health. Visit by Raisa Hitchcock, Staff .  [place], Fede.B., B.A.

## 2023-10-19 NOTE — H&P
Hospitalist History and Physical   Admit Date:  10/18/2023  4:25 PM   Name:  Connie Jamil   Age:  80 y.o. Sex:  female  :  10/13/1934   MRN:  [id number]   Room:      Presenting Complaint: Shortness of Breath, Chest Pain, and Dizziness     Reason(s) for Admission: New-onset angina ([address]) [I20.9]  Symptomatic anemia [D64.9]       Assessment & Plan:     Principal Problem:    Symptomatic anemia -pleasant 80year-old woman with history of hypertension, hyperlipidemia, colorectal cancer who presents with chest pain and shortness of breath along with several episodes of bloody stools. Hemoglobin has dropped from 13 down to 9.2. Troponins are 22.5 rising to 25.3. No recent GI work-up, no blood thinners. Active Problems:    Anemia  GI Bleed  HTN    Plan:   Admit med bed with remote telemetry, INPATIENT STATUS due to medical complexity, need for close cardiopulmonary monitoring given initial unstable vitals signs & need for IV medication  Trend hgb, hemoccult stools  IV protonix  GI consult in A.M, NPO after MN  Check ECHO, trend trop      Anticipated discharge needs:     NONE    Diet: regular  VTE ppx: SCDs  Code status: FULL      History of Present Illness:   Connie Jamil is a 80 y.o. female with medical history of hypertension, status post pacemaker, hyperlipidemia who presents to the emergency department with new onset exertional chest tightness with radiation into her left shoulder onset few days ago. She also gets short of breath with exertion for the last week. Initially associated nausea but no longer nauseated. No pain at rest.  New onset lower extremity edema and a little bit of weight gain reported as well. No fevers cough or congestion.  Did have several episodes of dark bloody stools over the weekend    Pt was at PCP today and referred to ED for symptoms of vertigo and rectal bleeding 1wk   Work-up in the emergency room showed a drop in her hemoglobin over the last year TYPE AND SCREEN    Collection Time: 10/18/23  6:24 PM   Result Value Ref Range    Crossmatch expiration date 10/21/2023,2359     ABO/Rh O POSITIVE     Antibody Screen NEG        I have personally reviewed imaging studies showing:  XR CHEST (2 VW)    Result Date: 10/18/2023  CHEST X-RAY, 2 views 10/18/2023 History: Nausea and dyspnea on exertion impression the left chest starting this week and radiating down left arm. Tightness in back. Technique: PA and lateral views of the chest. Comparison: Chest x-ray 1/12/2019 Findings: There is a grossly stable left-sided intracardiac device. The cardiac silhouette is mildly enlarged although grossly unchanged. A grossly unchanged large hiatal hernia is also suggested. The lungs are expanded without evidence for pneumothorax. No evolving consolidation, or evidence of pleural effusion is seen. The bony thorax demonstrates no acute changes. The upper abdomen is unremarkable in appearance. 1.  Stable mild cardiomegaly and large hiatal hernia. No evolving acute changes are evident on this plain film study. This report was made using voice transcription. Despite my best efforts to avoid any, transcription errors may persist. If there is any question about the accuracy of the report or need for clarification, then please call [phone], or text me through [employer] for clarification or correction. Echocardiogram:  No results found for this or any previous visit.         Orders Placed This Encounter   Medications    aspirin chewable tablet 324 mg    nitroglycerin (NITRO-BID) 2 % ointment 0.5 inch    sodium chloride flush 0.9 % injection 5-40 mL    sodium chloride flush 0.9 % injection 5-40 mL    0.9 % sodium chloride infusion    OR Linked Order Group     ondansetron (ZOFRAN-ODT) disintegrating tablet 4 mg     ondansetron (ZOFRAN) injection 4 mg    polyethylene glycol (GLYCOLAX) packet 17 g    OR Linked Order Group     acetaminophen (TYLENOL) tablet 650 mg

## 2023-10-19 NOTE — PLAN OF CARE
Problem: Discharge Planning  Goal: Discharge to home or other facility with appropriate resources  Outcome: Progressing  Flowsheets (Taken 10/19/2023 0845)  Discharge to home or other facility with appropriate resources: Identify barriers to discharge with patient and caregiver     Problem: Safety - Adult  Goal: Free from fall injury  Outcome: Progressing  Flowsheets (Taken 10/19/2023 0859)  Free From Fall Injury: Instruct family/caregiver on patient safety     Problem: ABCDS Injury Assessment  Goal: Absence of physical injury  Outcome: Progressing     Problem: Pain  Goal: Verbalizes/displays adequate comfort level or baseline comfort level  Outcome: Progressing  Flowsheets  Taken 10/19/2023 1116  Verbalizes/displays adequate comfort level or baseline comfort level: Encourage patient to monitor pain and request assistance  Taken 10/19/2023 0845  Verbalizes/displays adequate comfort level or baseline comfort level: Encourage patient to monitor pain and request assistance

## 2023-10-19 NOTE — CONSULTS
Gastroenterology Consult Note     [facility]     QLQ:[id number]    LHB:[id number]    Consult Date:  10/19/2023        Inpatient consult to GI  Consult performed by: Lai Allen MD    Consult ordered by: Deann Rosenbaum    Reason for consult: GI bleeding    History obtained from: Patient and son    History of Present Illness     80-year-old female admitted for dyspnea and fatigue with drop in hemoglobin to 9.2 compared to baseline of 13. She notes several dark dark stools over the last week. She denies any hematemesis. She describes slight dyspepsia and nausea. She has never had peptic ulcer disease. No recent upper endoscopy. Has been taking heavy doses of ibuprofen and also uses Voltaren gel. 2006 sigmoid colon resection for T3 N0 M0 cancer without any evidence for recurrence. Last colonoscopy 5 years ago by Dr. Talha Terry. He has not recommended further colonoscopies.     PMH:  Past Medical History:   Diagnosis Date    Bradycardia 2017     Biotronik pacemaker left chest, [facility] follows; last in person interrogation 7/28/21 pacer dependent, last remote interrogation 5/12/22    Cancer ([address])     colorectal cancer    Essential hypertension     managed with med    HLD (hyperlipidemia)     Hx of echocardiogram 09/13/2017    EF 64.5%    Nausea & vomiting     medications relieved    Osteopenia     Sciatica     Thromboembolus ([address])     right arm, due to chemo 15 years ago       PSH:  Past Surgical History:   Procedure Laterality Date    CARPAL TUNNEL RELEASE Left 04/01/2022    CATARACT REMOVAL Bilateral 2018    COLONOSCOPY N/A 2/28/2018    COLONOSCOPY  BMI 28 performed by Princess Artis MD at [facility]      and reversal    GI      rectocele    HEENT      Eye Surgery -Detached retina- Left    HERNIA REPAIR  12/2010    HYSTERECTOMY (CERVIX STATUS UNKNOWN)      ORTHOPEDIC SURGERY Bilateral 6-2008    Hand Surgery Bilateral    ORTHOPEDIC SURGERY Right 10/2014    Achilles tendon Eduardo  We will sign off. Please call with any questions. Amanda Galicia MD, [address] [place] [address]  Gastroenterology    10/19/2023  1:39 PM    Part of this note may have been written by using a voice dictation software. The note has been proof read but may still contain some grammatical/other or typographical errors.

## 2023-10-20 VITALS
BODY MASS INDEX: 28.32 KG/M2 | HEIGHT: 61 IN | TEMPERATURE: 97.7 F | RESPIRATION RATE: 22 BRPM | DIASTOLIC BLOOD PRESSURE: 59 MMHG | SYSTOLIC BLOOD PRESSURE: 126 MMHG | OXYGEN SATURATION: 93 % | HEART RATE: 69 BPM | WEIGHT: 150 LBS

## 2023-10-20 DIAGNOSIS — I50.31 ACUTE HEART FAILURE WITH PRESERVED EJECTION FRACTION (HCC): Primary | ICD-10-CM

## 2023-10-20 DIAGNOSIS — D64.9 ANEMIA, UNSPECIFIED TYPE: ICD-10-CM

## 2023-10-20 PROBLEM — I50.33 ACUTE ON CHRONIC DIASTOLIC HEART FAILURE (HCC): Status: RESOLVED | Noted: 2023-10-20 | Resolved: 2023-10-20

## 2023-10-20 PROBLEM — K92.2 GI BLEED: Status: RESOLVED | Noted: 2023-10-18 | Resolved: 2023-10-20

## 2023-10-20 PROBLEM — R06.09 DYSPNEA ON EXERTION: Status: RESOLVED | Noted: 2023-10-19 | Resolved: 2023-10-20

## 2023-10-20 PROBLEM — I50.33 ACUTE ON CHRONIC DIASTOLIC HEART FAILURE (HCC): Status: ACTIVE | Noted: 2023-10-20

## 2023-10-20 LAB
ANION GAP SERPL CALC-SCNC: 8 MMOL/L (ref 2–11)
BUN SERPL-MCNC: 12 MG/DL (ref 8–23)
CALCIUM SERPL-MCNC: 9.4 MG/DL (ref 8.3–10.4)
CHLORIDE SERPL-SCNC: 106 MMOL/L (ref 101–110)
CO2 SERPL-SCNC: 28 MMOL/L (ref 21–32)
CREAT SERPL-MCNC: 0.9 MG/DL (ref 0.6–1)
ERYTHROCYTE [DISTWIDTH] IN BLOOD BY AUTOMATED COUNT: 13.2 % (ref 11.9–14.6)
GLUCOSE SERPL-MCNC: 102 MG/DL (ref 65–100)
HCT VFR BLD AUTO: 27.8 % (ref 35.8–46.3)
HCT VFR BLD AUTO: 28.7 % (ref 35.8–46.3)
HGB BLD-MCNC: 8.7 G/DL (ref 11.7–15.4)
HGB BLD-MCNC: 9 G/DL (ref 11.7–15.4)
MAGNESIUM SERPL-MCNC: 1.8 MG/DL (ref 1.8–2.4)
MCH RBC QN AUTO: 30.4 PG (ref 26.1–32.9)
MCHC RBC AUTO-ENTMCNC: 31.3 G/DL (ref 31.4–35)
MCV RBC AUTO: 97.2 FL (ref 82–102)
NRBC # BLD: 0 K/UL (ref 0–0.2)
PLATELET # BLD AUTO: 258 K/UL (ref 150–450)
PMV BLD AUTO: 11.1 FL (ref 9.4–12.3)
POTASSIUM SERPL-SCNC: 3.6 MMOL/L (ref 3.5–5.1)
RBC # BLD AUTO: 2.86 M/UL (ref 4.05–5.2)
SODIUM SERPL-SCNC: 142 MMOL/L (ref 133–143)
WBC # BLD AUTO: 10.6 K/UL (ref 4.3–11.1)

## 2023-10-20 PROCEDURE — 6370000000 HC RX 637 (ALT 250 FOR IP): Performed by: PHYSICIAN ASSISTANT

## 2023-10-20 PROCEDURE — 83735 ASSAY OF MAGNESIUM: CPT

## 2023-10-20 PROCEDURE — 36415 COLL VENOUS BLD VENIPUNCTURE: CPT

## 2023-10-20 PROCEDURE — 80048 BASIC METABOLIC PNL TOTAL CA: CPT

## 2023-10-20 PROCEDURE — 2580000003 HC RX 258: Performed by: HOSPITALIST

## 2023-10-20 PROCEDURE — 6370000000 HC RX 637 (ALT 250 FOR IP): Performed by: HOSPITALIST

## 2023-10-20 PROCEDURE — 85014 HEMATOCRIT: CPT

## 2023-10-20 PROCEDURE — 85027 COMPLETE CBC AUTOMATED: CPT

## 2023-10-20 PROCEDURE — G0378 HOSPITAL OBSERVATION PER HR: HCPCS

## 2023-10-20 PROCEDURE — 6370000000 HC RX 637 (ALT 250 FOR IP): Performed by: INTERNAL MEDICINE

## 2023-10-20 PROCEDURE — 85018 HEMOGLOBIN: CPT

## 2023-10-20 RX ORDER — LANOLIN ALCOHOL/MO/W.PET/CERES
400 CREAM (GRAM) TOPICAL DAILY PRN
Qty: 30 TABLET | Refills: 0 | Status: SHIPPED | OUTPATIENT
Start: 2023-10-20 | End: 2023-11-19

## 2023-10-20 RX ORDER — LOSARTAN POTASSIUM 100 MG/1
50 TABLET ORAL DAILY
Qty: 90 TABLET | Refills: 3 | Status: SHIPPED | OUTPATIENT
Start: 2023-10-20 | End: 2023-10-25

## 2023-10-20 RX ORDER — PANTOPRAZOLE SODIUM 40 MG/1
40 TABLET, DELAYED RELEASE ORAL
Qty: 60 TABLET | Refills: 0 | Status: SHIPPED | OUTPATIENT
Start: 2023-10-20 | End: 2023-11-19

## 2023-10-20 RX ORDER — POTASSIUM CHLORIDE 750 MG/1
10 TABLET, EXTENDED RELEASE ORAL DAILY PRN
Qty: 60 TABLET | Refills: 0 | Status: SHIPPED | OUTPATIENT
Start: 2023-10-20 | End: 2023-10-25

## 2023-10-20 RX ORDER — FUROSEMIDE 20 MG/1
20 TABLET ORAL DAILY PRN
Qty: 30 TABLET | Refills: 0 | Status: SHIPPED | OUTPATIENT
Start: 2023-10-20 | End: 2023-11-19

## 2023-10-20 RX ORDER — CARVEDILOL 3.12 MG/1
3.12 TABLET ORAL 2 TIMES DAILY WITH MEALS
Qty: 60 TABLET | Refills: 0 | Status: SHIPPED | OUTPATIENT
Start: 2023-10-20 | End: 2023-10-25 | Stop reason: SDUPTHER

## 2023-10-20 RX ADMIN — CARVEDILOL 3.12 MG: 3.12 TABLET, FILM COATED ORAL at 09:55

## 2023-10-20 RX ADMIN — POTASSIUM CHLORIDE 10 MEQ: 750 TABLET, EXTENDED RELEASE ORAL at 09:55

## 2023-10-20 RX ADMIN — PANTOPRAZOLE SODIUM 40 MG: 40 TABLET, DELAYED RELEASE ORAL at 06:25

## 2023-10-20 RX ADMIN — SODIUM CHLORIDE, PRESERVATIVE FREE 10 ML: 5 INJECTION INTRAVENOUS at 09:54

## 2023-10-20 RX ADMIN — LOSARTAN POTASSIUM 100 MG: 50 TABLET, FILM COATED ORAL at 09:55

## 2023-10-20 RX ADMIN — FUROSEMIDE 20 MG: 40 TABLET ORAL at 09:55

## 2023-10-20 RX ADMIN — ATORVASTATIN CALCIUM 10 MG: 20 TABLET, FILM COATED ORAL at 09:55

## 2023-10-20 RX ADMIN — MAGNESIUM GLUCONATE 500 MG ORAL TABLET 400 MG: 500 TABLET ORAL at 09:54

## 2023-10-20 ASSESSMENT — PAIN SCALES - GENERAL: PAINLEVEL_OUTOF10: 0

## 2023-10-20 NOTE — DISCHARGE INSTRUCTIONS
Return to the hospital if you experience chest pain, shortness of breath or radiating pain that is unusual from your daily norm. Return to the hospital if you have bleeding with your bowel movements.

## 2023-10-20 NOTE — PROGRESS NOTES
Physician Progress Note      PATIENT:               Bhavani Tolliver  CSN #:                  [id number]  :                       10/13/1934  ADMIT DATE:       10/18/2023 4:25 PM  [facility] DATE:        10/20/2023 12:10 PM  RESPONDING  PROVIDER #:        Nati Campa        QUERY TEXT:    Type of Anemia: Please provide further specificity, if known. Clinical indicators include: anemia, cancer, bloody bowel movements,   hematochezia, hgb, gi bleed, h&h, transfuse, iron studies, oral iron, rbc,   hemoglobin, hematocrit, platelets, ferritin, iron, vitamin b12, vitamin b-12,   hemoglobin and hematocrit, i bleed, bleeding, hct  Options provided:  -- Anemia due to acute blood loss  -- Anemia due to chronic blood loss  -- Anemia due to iron deficiency  -- Anemia due to postoperative blood loss  -- Anemia due to chronic disease  -- Other - I will add my own diagnosis  -- Disagree - Not applicable / Not valid  -- Disagree - Clinically Unable to determine / Unknown        PROVIDER RESPONSE TEXT:    The patient has anemia due to chronic blood loss.       Electronically signed by:  Nait Campa 10/20/2023 12:30 PM

## 2023-10-20 NOTE — PLAN OF CARE
Problem: Discharge Planning  Goal: Discharge to home or other facility with appropriate resources  Outcome: Progressing  Flowsheets (Taken 10/20/2023 0901)  Discharge to home or other facility with appropriate resources:   Identify barriers to discharge with patient and caregiver   Arrange for needed discharge resources and transportation as appropriate   Identify discharge learning needs (meds, wound care, etc)   Refer to discharge planning if patient needs post-hospital services based on physician order or complex needs related to functional status, cognitive ability or social support system     Problem: Safety - Adult  Goal: Free from fall injury  Outcome: Progressing     Problem: ABCDS Injury Assessment  Goal: Absence of physical injury  Outcome: Progressing     Problem: Pain  Goal: Verbalizes/displays adequate comfort level or baseline comfort level  Outcome: Progressing     Problem: Cardiovascular - Adult  Goal: Maintains optimal cardiac output and hemodynamic stability  Outcome: Progressing  Goal: Absence of cardiac dysrhythmias or at baseline  Outcome: Progressing     Problem: Skin/Tissue Integrity - Adult  Goal: Skin integrity remains intact  Outcome: Progressing     Problem: Gastrointestinal - Adult  Goal: Minimal or absence of nausea and vomiting  Outcome: Progressing  Goal: Maintains or returns to baseline bowel function  Outcome: Progressing     Problem: Infection - Adult  Goal: Absence of infection at discharge  Outcome: Progressing  Goal: Absence of infection during hospitalization  Outcome: Progressing     Problem: Metabolic/Fluid and Electrolytes - Adult  Goal: Electrolytes maintained within normal limits  Outcome: Progressing

## 2023-10-20 NOTE — CARE COORDINATION
Gastroenterology Patient has discharge orders in on this day, Patient's discharge plan is to return home. Patient is in agreement with this discharge plan. No CM needs voiced or noted at this time.      ASSESSMENT NOTE    Attending Physician: Esteban Mendoza DO  Admit Problem: New-onset angina ([address]) [I20.9]  Symptomatic anemia [D64.9]  Acute on chronic diastolic heart failure ([address]) [I50.33]  Date/Time of Admission: 10/18/2023  4:25 PM  Problem List:  Patient Active Problem List   Diagnosis    Osteoarthritis of right knee    Sciatica    Osteoporosis    Elevated CEA    Closed nondisplaced fracture of left pubis with routine healing    Osteopenia of multiple sites    Fall    Chronic bilateral low back pain without sciatica    HLD (hyperlipidemia)    Essential hypertension with goal blood pressure less than 130/80    Total knee replacement status    Pacemaker    Disorder of right rotator cuff    Personal history of other malignant neoplasm of rectum, rectosigmoid junction, and anus    Vitamin D deficiency    Hx of malignant neoplasm of colon    Osteopenia, senile    Pure hypercholesterolemia, unspecified    Anemia    Acute on chronic diastolic heart failure [facility])       Service Assessment  Patient Orientation Alert and Oriented   Cognition Alert   History Provided By Patient   Primary Caregiver Self   Accompanied By/Relationship     Support Systems Spouse/Significant Other, [address] is: Legal Next of [address] [facility]   PCP Verified by CM Yes (confirmed PCP is Dr. Anna Trinidad)   Last Visit to PCP Within last 3 months (last PCP visit 3 months ago)   Prior Functional Level Independent in ADLs/IADLs   Current Functional Level Other (see comment) (TBD by clinicals)   Can patient return to prior living arrangement Yes   Ability to make needs known: Good   Family able to assist with home care needs: Yes   Would you like for me to discuss the discharge plan with any other family members/significant others, and if

## 2023-10-20 NOTE — DISCHARGE SUMMARY
Hospitalist Discharge Summary   Admit Date:  10/18/2023  4:25 PM   DC Note date: 10/20/2023  Name:  Bandar Ladd   Age:  80 y.o. Sex:  female  :  10/13/1934   MRN:  [id number]   Room:  [address] [place] [address]  PCP:  Petros Larose MD    Presenting Complaint: Shortness of Breath, Chest Pain, and Dizziness     Initial Admission Diagnosis: New-onset angina (HCC) [I20.9]  Symptomatic anemia [D64.9]  Acute on chronic diastolic heart failure ([address]) [I50.33]     Problem List for this Hospitalization (present on admission):    Principal Problem (Resolved):    Acute on chronic diastolic heart failure ([address])  Active Problems:    Pacemaker    Pure hypercholesterolemia, unspecified    Osteoporosis    Essential hypertension with goal blood pressure less than 130/80  Resolved Problems:    Symptomatic anemia    GI bleed    Dyspnea on exertion    Acute diastolic heart failure [facility])    Hospital Course:  Bandar Ladd is a pleasant and spry 80 y.o. female with medical history of pacemaker, colon cancer who presented with dyspnea on-going for 1 week. States she has gained 7 lbs in one week after drinking a of water when she had a presumable GI stomach bug resulting in diarrhea and bloody bowel movements. Diarrhea and hematochezia had resolved. In the ER she presented with a clear CXR with mild cardiomegaly. Hgb 9.2 down from 13 one year ago. Hgb has then down trended to 8.4 but then stabilized at 8.7. She was not hypoxic. She was admitted with concerns for acute HF vs symptomatic anemia. An echo was obtained which showed a preserved EF but abnormal diastolic function and mildly elevated RVSP of 39. Given her response to lasix and stable HGB, her symptoms were deemed more consistent with acute HFpEF. She was treated with2 0mg oral lasix and diuresed 3700 ml. She was supplemented with KCL and Magnesium while on lasix. Given her diastolic HF she was started on low dose Coreg.  Given being initated on PRN lasix (for wt defer to outpatient/prescribing providers. Procedures done this admission:  * No surgery found *    Consults this admission:  IP CONSULT TO GI    Echocardiogram results:  10/18/23    ECHO (TTE) COMPLETE (PRN CONTRAST/BUBBLE/STRAIN/3D) 10/19/2023  3:11 PM (Final)    Interpretation Summary    Left Ventricle: Normal left ventricular systolic function with a visually estimated EF of 60 - 65%. Left ventricle size is normal. Normal wall thickness. Normal wall motion. Abnormal diastolic function. Aortic Valve: Moderately thickened right cusp. Moderately calcified right cusp. Trace regurgitation. Mild stenosis of the aortic valve. AV mean gradient is 15 mmHg. AV area by continuity VTI is 1.5 cm2. Mitral Valve: Mild annular calcification of the mitral valve. Mild regurgitation. Tricuspid Valve: Mild regurgitation. Mildly elevated RVSP. The estimated RVSP is 39 mmHg. Signed by: Cathleen Kanner, MD on 10/19/2023  3:11 PM      Diagnostic Imaging/Tests:   XR CHEST (2 VW)    Result Date: 10/18/2023  1. Stable mild cardiomegaly and large hiatal hernia. No evolving acute changes are evident on this plain film study. This report was made using voice transcription. Despite my best efforts to avoid any, transcription errors may persist. If there is any question about the accuracy of the report or need for clarification, then please call [phone], or text me through [employer] for clarification or correction.         Labs: Results:       BMP, Mg, Phos Recent Labs     10/18/23  1549 10/19/23  0551 10/20/23  0319   * 144* 142   K 4.0 3.5 3.6   * 113* 106   CO2 24 27 28   ANIONGAP 8 4 8   BUN 16 11 12   CREATININE 0.80 0.70 0.90   LABGLOM >60 >60 >60   CALCIUM 9.9 9.2 9.4   GLUCOSE 125* 107* 102*   MG  --  1.5* 1.8      CBC Recent Labs     10/18/23  1549 10/19/23  0551 10/19/23  1607 10/20/23  0015 10/20/23  0319   WBC 10.4  --   --   --  10.6   RBC 3.04*  --   --   --  2.86*   HGB 9.2*   < > 9.4*

## 2023-10-20 NOTE — PROGRESS NOTES
Discharge order received. Discharge instructions provided to patient. Patient is aware of medication regimen and all associated side effects. Patient is aware of follow up appointments, diet restrictions, and activity restrictions. Opportunity provided to ask questions and answered. All belongings with patient upon discharge. Patient is aware of whom to call in case of emergency. Patient discharged to home in stable condition. with daughter.

## 2023-10-23 ENCOUNTER — TELEPHONE (OUTPATIENT)
Dept: INTERNAL MEDICINE CLINIC | Facility: CLINIC | Age: 88
End: 2023-10-23

## 2023-10-23 NOTE — TELEPHONE ENCOUNTER
Care Transitions Initial Follow Up Call    Outreach made within 2 business days of discharge: No    Patient: Sophie Ramires Patient : 10/13/1934   MRN: [id number]  Reason for Admission: Symptomatic anemia  Discharge Date: 10/20/23       Spoke with: Patient    Discharge department/facility: [facility] Interactive Patient Contact:  Was patient able to fill all prescriptions: Yes  Was patient instructed to bring all medications to the follow-up visit: Yes  Is patient taking all medications as directed in the discharge summary? Yes  Does patient understand their discharge instructions: Yes  Does patient have questions or concerns that need addressed prior to 7-14 day follow up office visit: no    Patient declined to schedule and said she is seeing cardiology 10/25/23 and will call us to get seen sooner than [facility] in November if needed.     Follow Up  Future Appointments   Date Time Provider [address] [place] [address]   10/25/2023 11:45 AM Merribeth Hamman, MD [facility] AM[facility]   2023  9:30 AM SHAHEEN Wyatt [facility] AMB   2023  1:20 PM Jose Carlos Law MD [facility] AMB   2024 10:15 AM Merribeth Hamman,  [facility]

## 2023-10-25 ENCOUNTER — OFFICE VISIT (OUTPATIENT)
Age: 88
End: 2023-10-25
Payer: MEDICARE

## 2023-10-25 VITALS
HEART RATE: 68 BPM | DIASTOLIC BLOOD PRESSURE: 70 MMHG | SYSTOLIC BLOOD PRESSURE: 138 MMHG | WEIGHT: 150 LBS | BODY MASS INDEX: 28.32 KG/M2 | HEIGHT: 61 IN

## 2023-10-25 DIAGNOSIS — I50.30 HEART FAILURE WITH PRESERVED EJECTION FRACTION, UNSPECIFIED HF CHRONICITY (HCC): ICD-10-CM

## 2023-10-25 DIAGNOSIS — Z95.0 CARDIAC PACEMAKER IN SITU: Primary | ICD-10-CM

## 2023-10-25 DIAGNOSIS — E78.2 MIXED HYPERLIPIDEMIA: ICD-10-CM

## 2023-10-25 DIAGNOSIS — I06.0 RHEUMATIC AORTIC STENOSIS: ICD-10-CM

## 2023-10-25 DIAGNOSIS — I10 ESSENTIAL HYPERTENSION WITH GOAL BLOOD PRESSURE LESS THAN 130/80: ICD-10-CM

## 2023-10-25 PROCEDURE — 99204 OFFICE O/P NEW MOD 45 MIN: CPT | Performed by: INTERNAL MEDICINE

## 2023-10-25 PROCEDURE — G8484 FLU IMMUNIZE NO ADMIN: HCPCS | Performed by: INTERNAL MEDICINE

## 2023-10-25 PROCEDURE — 1123F ACP DISCUSS/DSCN MKR DOCD: CPT | Performed by: INTERNAL MEDICINE

## 2023-10-25 PROCEDURE — G8428 CUR MEDS NOT DOCUMENT: HCPCS | Performed by: INTERNAL MEDICINE

## 2023-10-25 PROCEDURE — G8417 CALC BMI ABV UP PARAM F/U: HCPCS | Performed by: INTERNAL MEDICINE

## 2023-10-25 PROCEDURE — 1090F PRES/ABSN URINE INCON ASSESS: CPT | Performed by: INTERNAL MEDICINE

## 2023-10-25 PROCEDURE — 1036F TOBACCO NON-USER: CPT | Performed by: INTERNAL MEDICINE

## 2023-10-25 PROCEDURE — 1111F DSCHRG MED/CURRENT MED MERGE: CPT | Performed by: INTERNAL MEDICINE

## 2023-10-25 RX ORDER — LOSARTAN POTASSIUM 50 MG/1
50 TABLET ORAL DAILY
Qty: 90 TABLET | Refills: 3 | Status: SHIPPED | OUTPATIENT
Start: 2023-10-25

## 2023-10-25 RX ORDER — CARVEDILOL 3.12 MG/1
3.12 TABLET ORAL 2 TIMES DAILY WITH MEALS
Qty: 180 TABLET | Refills: 3 | Status: SHIPPED | OUTPATIENT
Start: 2023-10-25

## 2023-10-25 ASSESSMENT — ENCOUNTER SYMPTOMS
STRIDOR: 0
EYE PAIN: 0
ABDOMINAL PAIN: 0
COUGH: 0
APHONIA: 0
NAIL CHANGES: 0

## 2023-10-25 NOTE — PROGRESS NOTES
24. 4 (H) 0 - 14 pg/mL   Basic Metabolic Panel w/ Reflex to MG    Collection Time: 10/19/23  5:51 AM   Result Value Ref Range    Sodium 144 (H) 133 - 143 mmol/L    Potassium 3.5 3.5 - 5.1 mmol/L    Chloride 113 (H) 101 - 110 mmol/L    CO2 27 21 - 32 mmol/L    Anion Gap 4 2 - 11 mmol/L    Glucose 107 (H) 65 - 100 mg/dL    BUN 11 8 - 23 MG/DL    Creatinine 0.70 0.6 - 1.0 MG/DL    Est, Glom Filt Rate >60 >60 ml/min/1.73m2    Calcium 9.2 8.3 - 10.4 MG/DL   Magnesium    Collection Time: 10/19/23  5:51 AM   Result Value Ref Range    Magnesium 1.5 (L) 1.8 - 2.4 mg/dL   Echo (TTE) complete (PRN contrast/bubble/strain/3D)    Collection Time: 10/19/23  3:01 PM   Result Value Ref Range    LA Minor Axis 5.3 cm    LA Major Hilliard 5.6 cm    LA Area 2C 16.4 cm2    LA Area 4C 19.7 cm2    LA Volume 2C 41 22 - 52 mL    LA Volume 4C 57 (A) 22 - 52 mL    LA Volume BP 50 22 - 52 mL    LA Diameter 3.0 cm    LV EDV A4C 51 mL    LV ESV A4C 18 mL    IVSd 1.0 (A) 0.6 - 0.9 cm    LVIDd 3.1 (A) 3.9 - 5.3 cm    LVIDs 2.2 cm    LVOT Diameter 2.0 cm    LVOT Mean Gradient 4 mmHg    LVOT VTI 27.5 cm    LVOT Peak Velocity 1.2 m/s    LVOT Peak Gradient 6 mmHg    LVPWd 0.9 0.6 - 0.9 cm    LV E' Lateral Velocity 9 cm/s    LV E' Septal Velocity 6 cm/s    LV Ejection Fraction A4C 64 %    LVOT Area 3.1 cm2    LVOT SV 86.4 ml    AV Mean Velocity 1.9 m/s    AV Mean Gradient 15 mmHg    AV VTI 56.6 cm    AV Peak Velocity 2.6 m/s    AV Peak Gradient 27 mmHg    AV Area by VTI 1.5 cm2    AV Area by Peak Velocity 1.5 cm2    Aortic Root 2.6 cm    Ascending Aorta 2.8 cm    IVC Proxmal 2.0 cm    MV E Wave Deceleration Time 257.0 ms    MV A Velocity 1.41 m/s    MV E Velocity 1.29 m/s    MV Mean Gradient 6 mmHg    MV VTI 56.9 cm    MV Mean Velocity 1.1 m/s    MV Max Velocity 1.7 m/s    MV Peak Gradient 12 mmHg    MV Area by VTI 1.5 cm2    PV .0 ms    PV Max Velocity 1.2 m/s    PV Peak Gradient 6 mmHg    RVIDd 2.4 cm    RV Basal Dimension 3.0 cm    RV Free Wall Peak

## 2023-12-27 PROCEDURE — 93294 REM INTERROG EVL PM/LDLS PM: CPT | Performed by: INTERNAL MEDICINE

## 2023-12-27 PROCEDURE — 93296 REM INTERROG EVL PM/IDS: CPT | Performed by: INTERNAL MEDICINE

## 2024-01-23 ENCOUNTER — OFFICE VISIT (OUTPATIENT)
Age: 89
End: 2024-01-23
Payer: MEDICARE

## 2024-01-23 VITALS
BODY MASS INDEX: 28.89 KG/M2 | SYSTOLIC BLOOD PRESSURE: 138 MMHG | HEART RATE: 96 BPM | DIASTOLIC BLOOD PRESSURE: 78 MMHG | HEIGHT: 61 IN | WEIGHT: 153 LBS

## 2024-01-23 DIAGNOSIS — Z95.0 CARDIAC PACEMAKER IN SITU: ICD-10-CM

## 2024-01-23 DIAGNOSIS — I50.30 HEART FAILURE WITH PRESERVED EJECTION FRACTION, UNSPECIFIED HF CHRONICITY (HCC): ICD-10-CM

## 2024-01-23 DIAGNOSIS — I10 ESSENTIAL HYPERTENSION WITH GOAL BLOOD PRESSURE LESS THAN 130/80: Primary | ICD-10-CM

## 2024-01-23 PROCEDURE — 1090F PRES/ABSN URINE INCON ASSESS: CPT | Performed by: INTERNAL MEDICINE

## 2024-01-23 PROCEDURE — G8417 CALC BMI ABV UP PARAM F/U: HCPCS | Performed by: INTERNAL MEDICINE

## 2024-01-23 PROCEDURE — G8484 FLU IMMUNIZE NO ADMIN: HCPCS | Performed by: INTERNAL MEDICINE

## 2024-01-23 PROCEDURE — 99214 OFFICE O/P EST MOD 30 MIN: CPT | Performed by: INTERNAL MEDICINE

## 2024-01-23 PROCEDURE — G8427 DOCREV CUR MEDS BY ELIG CLIN: HCPCS | Performed by: INTERNAL MEDICINE

## 2024-01-23 PROCEDURE — 1036F TOBACCO NON-USER: CPT | Performed by: INTERNAL MEDICINE

## 2024-01-23 PROCEDURE — 1123F ACP DISCUSS/DSCN MKR DOCD: CPT | Performed by: INTERNAL MEDICINE

## 2024-01-23 RX ORDER — ASPIRIN 81 MG/1
81 TABLET ORAL DAILY
COMMUNITY

## 2024-01-23 RX ORDER — FUROSEMIDE 20 MG/1
20 TABLET ORAL DAILY PRN
Qty: 30 TABLET | Refills: 0 | Status: SHIPPED | OUTPATIENT
Start: 2024-01-23 | End: 2024-02-22

## 2024-01-23 RX ORDER — ALENDRONATE SODIUM 70 MG/1
70 TABLET ORAL
COMMUNITY

## 2024-01-23 RX ORDER — MECLIZINE HCL 12.5 MG/1
12.5 TABLET ORAL 3 TIMES DAILY PRN
COMMUNITY

## 2024-01-23 ASSESSMENT — ENCOUNTER SYMPTOMS
COUGH: 0
NAIL CHANGES: 0
ABDOMINAL PAIN: 0
EYE PAIN: 0
APHONIA: 0
STRIDOR: 0

## 2024-01-23 NOTE — PROGRESS NOTES
2 [facility], [address] [place] [address]  PHONE: [phone]    SUBJECTIVE:   Jacklyn Castillo is a 89 y.o. female 10/13/1934       Chief Complaint   Patient presents with    Congestive Heart Failure         .    History of present illness: 89 y.o. female hospitalization several months ago after drinking Powerade to recover from a gastrointestinal illness treated for HFpEF since that time she has had excellent control of her volume has taken salt out of her diet  ill in the hospital now.  Not needing diuretic therapies symptoms are controlled    Cardiac History:  . 1. ECG 08/28/2017 - sinus bradycardia, left axis deviation, abnormal R-wave progression,  milliseconds, QRS duration 98 milliseconds.   2. AV dissociation noted on perioperative rhythm strip during cataract surgery summer 2017.   3. Cardiac telemetry placed with sinus bradycardia, sinus arrest greater than 3.1 seconds. The patient continued to have symptoms.   4. Echocardiogram - normal left ventricular systolic function in 2017.   5. 10/2017 Biotronik dual chamber pacemaker placed. Device interrogation presented high degree of atrial pacing.   Echocardiogram ejection fraction 60 to 65% abnormal diastolic function mild aortic stenosis  Assessment:  HFPEF  Likely iatrogenic in the setting of increased sodium intake as well as aggressive hydration in the setting of gastrointestinal illness  Key CAD CHF Meds            furosemide (LASIX) 20 MG tablet (Taking)    Sig - Route: Take 1 tablet by mouth daily as needed (for wt gain of > 2 lb in one day or > 5 lb in one week with increased shortness of breath or leg swelling) - Oral    losartan (COZAAR) 50 MG tablet (Taking)    Sig - Route: Take 1 tablet by mouth daily I have decreased dose so that your blood pressure does not drop too low - Oral          Aortic stenosis  Mild  Hypertension  Key CAD CHF Meds            furosemide (LASIX) 20 MG tablet (Taking)    Sig - Route: Take 1

## 2024-03-25 PROCEDURE — 93294 REM INTERROG EVL PM/LDLS PM: CPT | Performed by: INTERNAL MEDICINE

## 2024-03-25 PROCEDURE — 93296 REM INTERROG EVL PM/IDS: CPT | Performed by: INTERNAL MEDICINE

## 2024-04-04 ENCOUNTER — OFFICE VISIT (OUTPATIENT)
Dept: INTERNAL MEDICINE CLINIC | Facility: CLINIC | Age: 89
End: 2024-04-04

## 2024-04-04 VITALS
SYSTOLIC BLOOD PRESSURE: 126 MMHG | WEIGHT: 156.2 LBS | DIASTOLIC BLOOD PRESSURE: 86 MMHG | OXYGEN SATURATION: 97 % | BODY MASS INDEX: 29.49 KG/M2 | HEIGHT: 61 IN | HEART RATE: 62 BPM | TEMPERATURE: 98.1 F

## 2024-04-04 DIAGNOSIS — E55.9 VITAMIN D DEFICIENCY: ICD-10-CM

## 2024-04-04 DIAGNOSIS — D50.8 IRON DEFICIENCY ANEMIA SECONDARY TO INADEQUATE DIETARY IRON INTAKE: ICD-10-CM

## 2024-04-04 DIAGNOSIS — R73.03 BORDERLINE DIABETES: ICD-10-CM

## 2024-04-04 DIAGNOSIS — Z23 NEED FOR SHINGLES VACCINE: ICD-10-CM

## 2024-04-04 DIAGNOSIS — Z00.00 MEDICARE ANNUAL WELLNESS VISIT, SUBSEQUENT: ICD-10-CM

## 2024-04-04 DIAGNOSIS — M81.0 AGE-RELATED OSTEOPOROSIS WITHOUT CURRENT PATHOLOGICAL FRACTURE: ICD-10-CM

## 2024-04-04 DIAGNOSIS — Z76.89 ENCOUNTER TO ESTABLISH CARE: Primary | ICD-10-CM

## 2024-04-04 DIAGNOSIS — E78.2 MIXED HYPERLIPIDEMIA: ICD-10-CM

## 2024-04-04 RX ORDER — ZOSTER VACCINE RECOMBINANT, ADJUVANTED 50 MCG/0.5
0.5 KIT INTRAMUSCULAR SEE ADMIN INSTRUCTIONS
Qty: 0.5 ML | Refills: 1 | Status: SHIPPED | OUTPATIENT
Start: 2024-04-04 | End: 2024-10-01

## 2024-04-04 ASSESSMENT — PATIENT HEALTH QUESTIONNAIRE - PHQ9
SUM OF ALL RESPONSES TO PHQ QUESTIONS 1-9: 0
SUM OF ALL RESPONSES TO PHQ QUESTIONS 1-9: 0
2. FEELING DOWN, DEPRESSED OR HOPELESS: NOT AT ALL
1. LITTLE INTEREST OR PLEASURE IN DOING THINGS: NOT AT ALL
SUM OF ALL RESPONSES TO PHQ QUESTIONS 1-9: 0
SUM OF ALL RESPONSES TO PHQ9 QUESTIONS 1 & 2: 0
SUM OF ALL RESPONSES TO PHQ QUESTIONS 1-9: 0

## 2024-04-04 ASSESSMENT — ENCOUNTER SYMPTOMS
DIARRHEA: 0
NAUSEA: 0
SHORTNESS OF BREATH: 0
ABDOMINAL PAIN: 0
COUGH: 0
ABDOMINAL DISTENTION: 0
VOMITING: 0
CHEST TIGHTNESS: 0

## 2024-04-04 NOTE — PROGRESS NOTES
by mouth 3 times daily as needed for Dizziness Yes Provider, MD Harry   Handicap Placard MISC by Does not apply route Yes Robert Bragg MD   losartan (COZAAR) 50 MG tablet Take 1 tablet by mouth daily I have decreased dose so that your blood pressure does not drop too low Yes Robert Bragg MD   cyanocobalamin 1000 MCG tablet Take 1 tablet by mouth daily Yes Provider, MD Harry   acetaminophen (TYLENOL) 325 MG tablet Take 2 tablets by mouth every 4 hours as needed Yes Automatic Reconciliation, Ar   vitamin D 25 MCG (1000 UT) CAPS Take 1 capsule by mouth daily Yes Automatic Reconciliation, Ar       CareTeam (Including outside providers/suppliers regularly involved in providing care):   Patient Care Team:  Belkys Cagle APRN - NP as PCP - General (Nurse Practitioner)  Melissa Hackett MD as PCP - Empaneled Provider  Delvin Peters MD as Consulting Physician (Cardiology)  Jhonny Chris APRN - CNP (Nurse Practitioner Adult Health)  Charlotte Marcos MD (Hand Surgery)  Alida Silvestre MA as Tech (Orthopaedic Surgery)  Antoinette Shah MD (Hematology and Oncology)  Estrellita Jolly APRN - NP (Nurse Practitioner)     Reviewed and updated this visit:  Tobacco  Allergies  Meds  Problems  Med Hx  Surg Hx  Soc Hx  Fam Hx

## 2024-04-17 ENCOUNTER — NURSE ONLY (OUTPATIENT)
Dept: INTERNAL MEDICINE CLINIC | Facility: CLINIC | Age: 89
End: 2024-04-17

## 2024-04-17 DIAGNOSIS — E55.9 VITAMIN D DEFICIENCY: ICD-10-CM

## 2024-04-17 DIAGNOSIS — D50.8 IRON DEFICIENCY ANEMIA SECONDARY TO INADEQUATE DIETARY IRON INTAKE: ICD-10-CM

## 2024-04-17 DIAGNOSIS — E83.42 LOW SERUM MAGNESIUM LEVEL: Primary | ICD-10-CM

## 2024-04-17 DIAGNOSIS — R73.03 BORDERLINE DIABETES: ICD-10-CM

## 2024-04-17 DIAGNOSIS — E78.2 MIXED HYPERLIPIDEMIA: ICD-10-CM

## 2024-04-17 DIAGNOSIS — M81.0 AGE-RELATED OSTEOPOROSIS WITHOUT CURRENT PATHOLOGICAL FRACTURE: ICD-10-CM

## 2024-04-17 LAB
25(OH)D3 SERPL-MCNC: 45.7 NG/ML (ref 30–100)
ALBUMIN SERPL-MCNC: 4.3 G/DL (ref 3.2–4.6)
ALBUMIN/GLOB SERPL: 1.7 (ref 0.4–1.6)
ALP SERPL-CCNC: 59 U/L (ref 50–136)
ALT SERPL-CCNC: 23 U/L (ref 12–65)
ANION GAP SERPL CALC-SCNC: 8 MMOL/L (ref 2–11)
AST SERPL-CCNC: 14 U/L (ref 15–37)
BASOPHILS # BLD: 0.1 K/UL (ref 0–0.2)
BASOPHILS NFR BLD: 1 % (ref 0–2)
BILIRUB SERPL-MCNC: 0.4 MG/DL (ref 0.2–1.1)
BUN SERPL-MCNC: 26 MG/DL (ref 8–23)
CALCIUM SERPL-MCNC: 10.9 MG/DL (ref 8.3–10.4)
CHLORIDE SERPL-SCNC: 102 MMOL/L (ref 103–113)
CHOLEST SERPL-MCNC: 189 MG/DL
CO2 SERPL-SCNC: 26 MMOL/L (ref 21–32)
CREAT SERPL-MCNC: 1 MG/DL (ref 0.6–1)
DIFFERENTIAL METHOD BLD: ABNORMAL
EOSINOPHIL # BLD: 0.4 K/UL (ref 0–0.8)
EOSINOPHIL NFR BLD: 5 % (ref 0.5–7.8)
ERYTHROCYTE [DISTWIDTH] IN BLOOD BY AUTOMATED COUNT: 16.5 % (ref 11.9–14.6)
GLOBULIN SER CALC-MCNC: 2.6 G/DL (ref 2.8–4.5)
GLUCOSE SERPL-MCNC: 121 MG/DL (ref 65–100)
HCT VFR BLD AUTO: 44.8 % (ref 35.8–46.3)
HDLC SERPL-MCNC: 70 MG/DL (ref 40–60)
HDLC SERPL: 2.7
HGB BLD-MCNC: 14.1 G/DL (ref 11.7–15.4)
IMM GRANULOCYTES # BLD AUTO: 0 K/UL (ref 0–0.5)
IMM GRANULOCYTES NFR BLD AUTO: 0 % (ref 0–5)
LDLC SERPL CALC-MCNC: 98.4 MG/DL
LYMPHOCYTES # BLD: 2.7 K/UL (ref 0.5–4.6)
LYMPHOCYTES NFR BLD: 33 % (ref 13–44)
MAGNESIUM SERPL-MCNC: 1.5 MG/DL (ref 1.8–2.4)
MCH RBC QN AUTO: 28.8 PG (ref 26.1–32.9)
MCHC RBC AUTO-ENTMCNC: 31.5 G/DL (ref 31.4–35)
MCV RBC AUTO: 91.6 FL (ref 82–102)
MONOCYTES # BLD: 0.6 K/UL (ref 0.1–1.3)
MONOCYTES NFR BLD: 7 % (ref 4–12)
NEUTS SEG # BLD: 4.5 K/UL (ref 1.7–8.2)
NEUTS SEG NFR BLD: 54 % (ref 43–78)
NRBC # BLD: 0 K/UL (ref 0–0.2)
PLATELET # BLD AUTO: 216 K/UL (ref 150–450)
PMV BLD AUTO: 11 FL (ref 9.4–12.3)
POTASSIUM SERPL-SCNC: 4.5 MMOL/L (ref 3.5–5.1)
PROT SERPL-MCNC: 6.9 G/DL (ref 6.3–8.2)
RBC # BLD AUTO: 4.89 M/UL (ref 4.05–5.2)
SODIUM SERPL-SCNC: 136 MMOL/L (ref 136–146)
T4 FREE SERPL-MCNC: 1.1 NG/DL (ref 0.78–1.46)
TRIGL SERPL-MCNC: 103 MG/DL (ref 35–150)
TSH W FREE THYROID IF ABNORMAL: 5.08 UIU/ML (ref 0.36–3.74)
VLDLC SERPL CALC-MCNC: 20.6 MG/DL (ref 6–23)
WBC # BLD AUTO: 8.2 K/UL (ref 4.3–11.1)

## 2024-04-18 ENCOUNTER — HOSPITAL ENCOUNTER (OUTPATIENT)
Dept: MAMMOGRAPHY | Age: 89
Discharge: HOME | End: 2024-04-18
Payer: MEDICARE

## 2024-04-18 DIAGNOSIS — E83.42 LOW SERUM MAGNESIUM LEVEL: Primary | ICD-10-CM

## 2024-04-18 DIAGNOSIS — E03.8 SUBCLINICAL HYPOTHYROIDISM: ICD-10-CM

## 2024-04-18 LAB
EST. AVERAGE GLUCOSE BLD GHB EST-MCNC: 123 MG/DL
HBA1C MFR BLD: 5.9 % (ref 4.8–5.6)

## 2024-04-18 PROCEDURE — 77080 DXA BONE DENSITY AXIAL: CPT

## 2024-04-18 RX ORDER — CALCIUM CARBONATE/VITAMIN D3 500-10/5ML
400 LIQUID (ML) ORAL DAILY
Qty: 90 CAPSULE | Refills: 3 | Status: SHIPPED | OUTPATIENT
Start: 2024-04-18

## 2024-04-18 NOTE — PROGRESS NOTES
Subclinical hypothyroidism noted.  Will recheck TSH and T4 in 8 weeks.  Mag low.  Added magnesium oxide will recheck mag in 8 weeks as well.

## 2024-06-19 ENCOUNTER — TELEPHONE (OUTPATIENT)
Dept: INTERNAL MEDICINE CLINIC | Facility: CLINIC | Age: 89
End: 2024-06-19

## 2024-06-19 ENCOUNTER — NURSE ONLY (OUTPATIENT)
Dept: INTERNAL MEDICINE CLINIC | Facility: CLINIC | Age: 89
End: 2024-06-19

## 2024-06-19 DIAGNOSIS — E03.8 SUBCLINICAL HYPOTHYROIDISM: ICD-10-CM

## 2024-06-19 DIAGNOSIS — E83.42 LOW SERUM MAGNESIUM LEVEL: ICD-10-CM

## 2024-06-19 LAB
MAGNESIUM SERPL-MCNC: 1.5 MG/DL (ref 1.8–2.4)
T4 FREE SERPL-MCNC: 1.3 NG/DL (ref 0.9–1.7)
TSH, 3RD GENERATION: 6.15 UIU/ML (ref 0.27–4.2)

## 2024-06-19 NOTE — TELEPHONE ENCOUNTER
Attempted to call patient with lab results.  Magnesium remains low.  Is she taking the daily Mg supplement?  If so, we will need to increase to twice a day dosing.  Labs still reveals subclinical hypothyroidism, but not concerning until TSH is high with abnormal T4.  Will recheck at next appointment.

## 2024-06-20 NOTE — TELEPHONE ENCOUNTER
I spoke with patient and informed her of results.  Verbalized understanding.  Pt states she has not been taking the magnesium supplement daily, but she will start making sure she takes it every day.

## 2024-06-27 PROCEDURE — 93296 REM INTERROG EVL PM/IDS: CPT | Performed by: INTERNAL MEDICINE

## 2024-06-27 PROCEDURE — 93294 REM INTERROG EVL PM/LDLS PM: CPT | Performed by: INTERNAL MEDICINE

## 2024-07-30 ENCOUNTER — OFFICE VISIT (OUTPATIENT)
Age: 89
End: 2024-07-30
Payer: MEDICARE

## 2024-07-30 VITALS
HEIGHT: 61 IN | WEIGHT: 152.2 LBS | BODY MASS INDEX: 28.74 KG/M2 | HEART RATE: 68 BPM | DIASTOLIC BLOOD PRESSURE: 72 MMHG | SYSTOLIC BLOOD PRESSURE: 122 MMHG

## 2024-07-30 DIAGNOSIS — I10 ESSENTIAL HYPERTENSION WITH GOAL BLOOD PRESSURE LESS THAN 130/80: ICD-10-CM

## 2024-07-30 DIAGNOSIS — R01.1 HEART MURMUR: ICD-10-CM

## 2024-07-30 DIAGNOSIS — I35.0 NONRHEUMATIC AORTIC VALVE STENOSIS: ICD-10-CM

## 2024-07-30 DIAGNOSIS — Z95.0 CARDIAC PACEMAKER IN SITU: Primary | ICD-10-CM

## 2024-07-30 DIAGNOSIS — I50.30 HEART FAILURE WITH PRESERVED EJECTION FRACTION, UNSPECIFIED HF CHRONICITY (HCC): ICD-10-CM

## 2024-07-30 PROCEDURE — 1036F TOBACCO NON-USER: CPT | Performed by: INTERNAL MEDICINE

## 2024-07-30 PROCEDURE — 99214 OFFICE O/P EST MOD 30 MIN: CPT | Performed by: INTERNAL MEDICINE

## 2024-07-30 PROCEDURE — G8417 CALC BMI ABV UP PARAM F/U: HCPCS | Performed by: INTERNAL MEDICINE

## 2024-07-30 PROCEDURE — 1090F PRES/ABSN URINE INCON ASSESS: CPT | Performed by: INTERNAL MEDICINE

## 2024-07-30 PROCEDURE — 1123F ACP DISCUSS/DSCN MKR DOCD: CPT | Performed by: INTERNAL MEDICINE

## 2024-07-30 PROCEDURE — G8427 DOCREV CUR MEDS BY ELIG CLIN: HCPCS | Performed by: INTERNAL MEDICINE

## 2024-07-30 RX ORDER — LOSARTAN POTASSIUM 50 MG/1
50 TABLET ORAL DAILY
Qty: 90 TABLET | Refills: 3 | Status: SHIPPED | OUTPATIENT
Start: 2024-07-30

## 2024-07-30 ASSESSMENT — ENCOUNTER SYMPTOMS
EYE PAIN: 0
ABDOMINAL PAIN: 0
STRIDOR: 0
NAIL CHANGES: 0
APHONIA: 0
COUGH: 0

## 2024-07-30 NOTE — PROGRESS NOTES
Neg Hx     Stroke Sister     Diabetes Sister     Heart Disease Sister     Seizures Son         brain tumor removed    Cancer Sister         lung ca    Other Sister         Lung abcess    Stroke Brother     Heart Disease Brother     Heart Disease Sister     Cancer Sister         melanoma    Hypertension Mother     Cancer Mother     Post-op Cognitive Dysfunction Neg Hx     Emergence Delirium Neg Hx     Post-op Nausea/Vomiting Neg Hx     Delayed Awakening Neg Hx     Pseudochol. Deficiency Neg Hx     Cancer Father         colorectal malignancy     Social History     Tobacco Use    Smoking status: Never    Smokeless tobacco: Never   Substance Use Topics    Alcohol use: No       ROS:    Review of Systems   Constitutional: Negative for fever.   HENT:  Negative for stridor.    Eyes:  Negative for pain.   Cardiovascular:  Negative for chest pain.   Respiratory:  Negative for cough.    Endocrine: Negative for cold intolerance.   Skin:  Negative for nail changes.   Musculoskeletal:  Negative for arthritis.   Gastrointestinal:  Negative for abdominal pain.   Genitourinary:  Negative for dysuria.   Neurological:  Negative for aphonia.   Psychiatric/Behavioral:  Negative for altered mental status.    Allergic/Immunologic: Negative for hives.          PHYSICAL EXAM:   /72   Pulse 68   Ht 1.549 m (5' 1\")   Wt 69 kg (152 lb 3.2 oz)   BMI 28.76 kg/m²      Physical Exam  Vitals reviewed.   HENT:      Head: Normocephalic.      Right Ear: External ear normal.      Left Ear: External ear normal.      Nose: Nose normal.   Eyes:      General: No scleral icterus.  Cardiovascular:      Heart sounds: Murmur heard.   Pulmonary:      Effort: Pulmonary effort is normal.   Abdominal:      General: There is no distension.   Musculoskeletal:      Cervical back: Neck supple.   Skin:     General: Skin is warm.   Neurological:      Mental Status: She is alert. Mental status is at baseline.         Medical problems and test results were

## 2024-10-17 ENCOUNTER — NURSE ONLY (OUTPATIENT)
Age: 89
End: 2024-10-17

## 2024-10-17 DIAGNOSIS — I49.5 SICK SINUS SYNDROME (HCC): Primary | ICD-10-CM

## 2024-11-11 ENCOUNTER — OFFICE VISIT (OUTPATIENT)
Dept: INTERNAL MEDICINE CLINIC | Facility: CLINIC | Age: 89
End: 2024-11-11

## 2024-11-11 VITALS
TEMPERATURE: 97.3 F | DIASTOLIC BLOOD PRESSURE: 58 MMHG | SYSTOLIC BLOOD PRESSURE: 125 MMHG | HEIGHT: 61 IN | HEART RATE: 60 BPM | WEIGHT: 152 LBS | BODY MASS INDEX: 28.7 KG/M2

## 2024-11-11 DIAGNOSIS — M25.542 ARTHRALGIA OF BOTH HANDS: ICD-10-CM

## 2024-11-11 DIAGNOSIS — M25.541 ARTHRALGIA OF BOTH HANDS: ICD-10-CM

## 2024-11-11 DIAGNOSIS — E83.42 HYPOMAGNESEMIA: ICD-10-CM

## 2024-11-11 DIAGNOSIS — R73.9 HYPERGLYCEMIA: ICD-10-CM

## 2024-11-11 DIAGNOSIS — E55.9 VITAMIN D DEFICIENCY: ICD-10-CM

## 2024-11-11 DIAGNOSIS — Z23 FLU VACCINE NEED: ICD-10-CM

## 2024-11-11 DIAGNOSIS — Z95.0 PACEMAKER: ICD-10-CM

## 2024-11-11 DIAGNOSIS — I10 ESSENTIAL HYPERTENSION WITH GOAL BLOOD PRESSURE LESS THAN 130/80: Primary | ICD-10-CM

## 2024-11-11 DIAGNOSIS — Z23 NEED FOR TDAP VACCINATION: ICD-10-CM

## 2024-11-11 DIAGNOSIS — E78.2 MIXED HYPERLIPIDEMIA: ICD-10-CM

## 2024-11-11 RX ORDER — LOSARTAN POTASSIUM 50 MG/1
25 TABLET ORAL DAILY
Qty: 45 TABLET | Refills: 3 | Status: SHIPPED
Start: 2024-11-11

## 2024-11-11 RX ORDER — CALCIUM CARBONATE/VITAMIN D3 500-10/5ML
400 LIQUID (ML) ORAL DAILY
Qty: 90 CAPSULE | Refills: 3 | Status: SHIPPED | OUTPATIENT
Start: 2024-11-11

## 2024-11-11 ASSESSMENT — ENCOUNTER SYMPTOMS
CHEST TIGHTNESS: 0
VOMITING: 0
COUGH: 0
NAUSEA: 0
SHORTNESS OF BREATH: 0
ABDOMINAL PAIN: 0
DIARRHEA: 0
ABDOMINAL DISTENTION: 0
CONSTIPATION: 0

## 2024-11-11 ASSESSMENT — PATIENT HEALTH QUESTIONNAIRE - PHQ9
2. FEELING DOWN, DEPRESSED OR HOPELESS: NOT AT ALL
SUM OF ALL RESPONSES TO PHQ9 QUESTIONS 1 & 2: 0
SUM OF ALL RESPONSES TO PHQ QUESTIONS 1-9: 0
1. LITTLE INTEREST OR PLEASURE IN DOING THINGS: NOT AT ALL
SUM OF ALL RESPONSES TO PHQ QUESTIONS 1-9: 0

## 2024-11-11 NOTE — PROGRESS NOTES
[facility]  [address] [address]     Office Visit    Jacklyn Castillo   10/13/1934   11/11/24       Subjective:     Chief Complaint   Patient presents with    Follow-up     6 month HTN/HLD follow up     Joint Swelling     Pt reports bilateral hand swelling and pain, it is worsening.         History of Present illness:  Ms. Castillo is a 89 y.o. female  with PMH of HFpEF, aortic stenosis, HTN, PPM, HLD, osteoporosis, colon cancer, rectal cancer, and CLL who presents for follow up on chronic conditions and the above complaints. Her  has been in rehab after a hospitalization and she drives up to see him at the [facility] everyday. He is returning home tomorrow. She admits to feeling good except bl hand pain and swelling. BL hands with joint deformities. 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 Right 5th finger with dip contracture. Pain in BL hands.     HTN- Cardiology decreased Losartan to prevent low blood pressure. She states BP's are well controlled at home and denies having any low BP readings.     Objective:     Allergies:    Allergies   Allergen Reactions    Hydrocodone-Acetaminophen Nausea And Vomiting, Other (See Comments) and Rash    Metformin And Related Diarrhea    Lisinopril Other (See Comments)     Antihypertensives        Medical History:    Past Medical History:   Diagnosis Date    Bradycardia 2017     Biotronik pacemaker left chest, [facility] Cardiology follows; last in person interrogation 7/28/21 pacer dependent, last remote interrogation 5/12/22    Cancer (HCC)     colorectal cancer    Essential hypertension     managed with med    HLD (hyperlipidemia)     Hx of echocardiogram 09/13/2017    EF 64.5%    Nausea & vomiting     medications relieved    Osteopenia     Sciatica     Thromboembolus (HCC)     right arm, due to chemo 15 years ago        Family History:    Family History   Problem

## 2024-11-13 DIAGNOSIS — E55.9 VITAMIN D DEFICIENCY: ICD-10-CM

## 2024-11-13 DIAGNOSIS — M25.542 ARTHRALGIA OF BOTH HANDS: ICD-10-CM

## 2024-11-13 DIAGNOSIS — M25.541 ARTHRALGIA OF BOTH HANDS: ICD-10-CM

## 2024-11-13 DIAGNOSIS — I10 ESSENTIAL HYPERTENSION WITH GOAL BLOOD PRESSURE LESS THAN 130/80: ICD-10-CM

## 2024-11-13 DIAGNOSIS — M1A.00X1 GOUTY ARTHROPATHY, CHRONIC, WITH TOPHI: Primary | ICD-10-CM

## 2024-11-13 DIAGNOSIS — E78.2 MIXED HYPERLIPIDEMIA: ICD-10-CM

## 2024-11-13 DIAGNOSIS — E83.42 HYPOMAGNESEMIA: ICD-10-CM

## 2024-11-13 DIAGNOSIS — R73.9 HYPERGLYCEMIA: ICD-10-CM

## 2024-11-13 LAB
25(OH)D3 SERPL-MCNC: 37.7 NG/ML (ref 30–100)
ALBUMIN SERPL-MCNC: 4 G/DL (ref 3.2–4.6)
ALBUMIN/GLOB SERPL: 1.6 (ref 1–1.9)
ALP SERPL-CCNC: 60 U/L (ref 35–104)
ALT SERPL-CCNC: 21 U/L (ref 8–45)
ANION GAP SERPL CALC-SCNC: 13 MMOL/L (ref 7–16)
AST SERPL-CCNC: 31 U/L (ref 15–37)
BASOPHILS # BLD: 0.1 K/UL (ref 0–0.2)
BASOPHILS NFR BLD: 1 % (ref 0–2)
BILIRUB SERPL-MCNC: 0.4 MG/DL (ref 0–1.2)
BUN SERPL-MCNC: 26 MG/DL (ref 8–23)
CALCIUM SERPL-MCNC: 10.6 MG/DL (ref 8.8–10.2)
CHLORIDE SERPL-SCNC: 98 MMOL/L (ref 98–107)
CHOLEST SERPL-MCNC: 179 MG/DL (ref 0–200)
CO2 SERPL-SCNC: 25 MMOL/L (ref 20–29)
CREAT SERPL-MCNC: 1.04 MG/DL (ref 0.6–1.1)
DIFFERENTIAL METHOD BLD: NORMAL
EOSINOPHIL # BLD: 0.2 K/UL (ref 0–0.8)
EOSINOPHIL NFR BLD: 2 % (ref 0.5–7.8)
ERYTHROCYTE [DISTWIDTH] IN BLOOD BY AUTOMATED COUNT: 12.7 % (ref 11.9–14.6)
EST. AVERAGE GLUCOSE BLD GHB EST-MCNC: 136 MG/DL
GLOBULIN SER CALC-MCNC: 2.5 G/DL (ref 2.3–3.5)
GLUCOSE SERPL-MCNC: 108 MG/DL (ref 70–99)
HBA1C MFR BLD: 6.4 % (ref 0–5.6)
HCT VFR BLD AUTO: 41 % (ref 35.8–46.3)
HDLC SERPL-MCNC: 67 MG/DL (ref 40–60)
HDLC SERPL: 2.7 (ref 0–5)
HGB BLD-MCNC: 13.7 G/DL (ref 11.7–15.4)
IMM GRANULOCYTES # BLD AUTO: 0 K/UL (ref 0–0.5)
IMM GRANULOCYTES NFR BLD AUTO: 0 % (ref 0–5)
LDLC SERPL CALC-MCNC: 84 MG/DL (ref 0–100)
LYMPHOCYTES # BLD: 4.1 K/UL (ref 0.5–4.6)
LYMPHOCYTES NFR BLD: 39 % (ref 13–44)
MAGNESIUM SERPL-MCNC: 1.5 MG/DL (ref 1.8–2.4)
MCH RBC QN AUTO: 31.9 PG (ref 26.1–32.9)
MCHC RBC AUTO-ENTMCNC: 33.4 G/DL (ref 31.4–35)
MCV RBC AUTO: 95.3 FL (ref 82–102)
MONOCYTES # BLD: 0.5 K/UL (ref 0.1–1.3)
MONOCYTES NFR BLD: 5 % (ref 4–12)
NEUTS SEG # BLD: 5.8 K/UL (ref 1.7–8.2)
NEUTS SEG NFR BLD: 53 % (ref 43–78)
NRBC # BLD: 0 K/UL (ref 0–0.2)
PLATELET # BLD AUTO: 178 K/UL (ref 150–450)
PMV BLD AUTO: 11.6 FL (ref 9.4–12.3)
POTASSIUM SERPL-SCNC: 4.7 MMOL/L (ref 3.5–5.1)
PROT SERPL-MCNC: 6.5 G/DL (ref 6.3–8.2)
RBC # BLD AUTO: 4.3 M/UL (ref 4.05–5.2)
SODIUM SERPL-SCNC: 136 MMOL/L (ref 136–145)
T4 FREE SERPL-MCNC: 1.3 NG/DL (ref 0.9–1.7)
TRIGL SERPL-MCNC: 139 MG/DL (ref 0–150)
TSH W FREE THYROID IF ABNORMAL: 5.21 UIU/ML (ref 0.27–4.2)
URATE SERPL-MCNC: 9.7 MG/DL (ref 2.5–7.1)
VLDLC SERPL CALC-MCNC: 28 MG/DL (ref 6–23)
WBC # BLD AUTO: 10.7 K/UL (ref 4.3–11.1)

## 2024-11-14 LAB
CCP IGA+IGG SERPL IA-ACNC: 5 UNITS (ref 0–19)
RHEUMATOID FACT SER QL LA: NEGATIVE

## 2024-11-18 RX ORDER — COLCHICINE 0.6 MG/1
TABLET ORAL
Qty: 30 TABLET | Refills: 3 | Status: SHIPPED | OUTPATIENT
Start: 2024-11-18

## 2024-11-18 RX ORDER — ALLOPURINOL 100 MG/1
100 TABLET ORAL DAILY
Qty: 90 TABLET | Refills: 1 | Status: SHIPPED | OUTPATIENT
Start: 2024-11-18

## 2024-11-19 DIAGNOSIS — E83.52 HYPERCALCEMIA: Primary | ICD-10-CM

## 2024-11-19 DIAGNOSIS — M1A.9XX1 GOUT WITH TOPHI: ICD-10-CM

## 2024-11-19 DIAGNOSIS — I10 ESSENTIAL HYPERTENSION WITH GOAL BLOOD PRESSURE LESS THAN 130/80: ICD-10-CM

## 2024-11-19 DIAGNOSIS — E83.42 HYPOMAGNESEMIA: ICD-10-CM

## 2024-12-28 PROCEDURE — 93296 REM INTERROG EVL PM/IDS: CPT | Performed by: INTERNAL MEDICINE

## 2024-12-28 PROCEDURE — 93294 REM INTERROG EVL PM/LDLS PM: CPT | Performed by: INTERNAL MEDICINE

## 2025-01-01 ENCOUNTER — TELEPHONE (OUTPATIENT)
Dept: INTERNAL MEDICINE CLINIC | Facility: CLINIC | Age: 89
End: 2025-01-01

## 2025-01-07 DIAGNOSIS — I10 ESSENTIAL HYPERTENSION WITH GOAL BLOOD PRESSURE LESS THAN 130/80: ICD-10-CM

## 2025-01-07 DIAGNOSIS — E83.52 HYPERCALCEMIA: ICD-10-CM

## 2025-01-07 DIAGNOSIS — M1A.9XX1 GOUT WITH TOPHI: ICD-10-CM

## 2025-01-07 DIAGNOSIS — E83.42 HYPOMAGNESEMIA: ICD-10-CM

## 2025-01-07 LAB
ALBUMIN SERPL-MCNC: 3.7 G/DL (ref 3.2–4.6)
ALBUMIN/GLOB SERPL: 1.4 (ref 1–1.9)
ALP SERPL-CCNC: 79 U/L (ref 35–104)
ALT SERPL-CCNC: 16 U/L (ref 8–45)
ANION GAP SERPL CALC-SCNC: 13 MMOL/L (ref 7–16)
AST SERPL-CCNC: 34 U/L (ref 15–37)
BILIRUB SERPL-MCNC: 0.4 MG/DL (ref 0–1.2)
BUN SERPL-MCNC: 18 MG/DL (ref 8–23)
CALCIUM SERPL-MCNC: 11.4 MG/DL (ref 8.8–10.2)
CALCIUM SERPL-MCNC: 11.6 MG/DL (ref 8.8–10.2)
CHLORIDE SERPL-SCNC: 99 MMOL/L (ref 98–107)
CO2 SERPL-SCNC: 22 MMOL/L (ref 20–29)
CREAT SERPL-MCNC: 0.86 MG/DL (ref 0.6–1.1)
GLOBULIN SER CALC-MCNC: 2.7 G/DL (ref 2.3–3.5)
GLUCOSE SERPL-MCNC: 112 MG/DL (ref 70–99)
MAGNESIUM SERPL-MCNC: 1.5 MG/DL (ref 1.8–2.4)
POTASSIUM SERPL-SCNC: 4.5 MMOL/L (ref 3.5–5.1)
PROT SERPL-MCNC: 6.4 G/DL (ref 6.3–8.2)
PTH-INTACT SERPL-MCNC: 20.5 PG/ML (ref 15–65)
SODIUM SERPL-SCNC: 134 MMOL/L (ref 136–145)
URATE SERPL-MCNC: 6.3 MG/DL (ref 2.5–7.1)

## 2025-01-12 DIAGNOSIS — E83.42 HYPOMAGNESEMIA: Primary | ICD-10-CM

## 2025-01-12 NOTE — RESULT ENCOUNTER NOTE
Please call with labs. Sodium slightly low, she has permission to eat a little more salt. Calcium remains on high side.  If she is taking Calcium, she needs to stop it for now. Is she taking the Magnesium? All other labs are stable. Uric acid level now wnl.

## 2025-01-28 ENCOUNTER — APPOINTMENT (OUTPATIENT)
Dept: CT IMAGING | Age: 89
DRG: 444 | End: 2025-01-28
Payer: MEDICARE

## 2025-01-28 ENCOUNTER — APPOINTMENT (OUTPATIENT)
Dept: ULTRASOUND IMAGING | Age: 89
DRG: 444 | End: 2025-01-28
Payer: MEDICARE

## 2025-01-28 ENCOUNTER — HOSPITAL ENCOUNTER (INPATIENT)
Age: 89
LOS: 12 days | Discharge: NURSING, REHAB OR LONG-TERM CARE | DRG: 444 | End: 2025-02-09
Attending: EMERGENCY MEDICINE | Admitting: FAMILY MEDICINE
Payer: MEDICARE

## 2025-01-28 ENCOUNTER — OFFICE VISIT (OUTPATIENT)
Dept: INTERNAL MEDICINE CLINIC | Facility: CLINIC | Age: 89
End: 2025-01-28
Payer: MEDICARE

## 2025-01-28 ENCOUNTER — TELEPHONE (OUTPATIENT)
Age: 89
End: 2025-01-28

## 2025-01-28 ENCOUNTER — APPOINTMENT (OUTPATIENT)
Dept: GENERAL RADIOLOGY | Age: 89
DRG: 444 | End: 2025-01-28
Payer: MEDICARE

## 2025-01-28 VITALS
DIASTOLIC BLOOD PRESSURE: 78 MMHG | SYSTOLIC BLOOD PRESSURE: 166 MMHG | BODY MASS INDEX: 28.77 KG/M2 | OXYGEN SATURATION: 95 % | HEART RATE: 69 BPM | WEIGHT: 152.4 LBS | TEMPERATURE: 98.9 F | HEIGHT: 61 IN

## 2025-01-28 DIAGNOSIS — I50.9 ACUTE ON CHRONIC CONGESTIVE HEART FAILURE, UNSPECIFIED HEART FAILURE TYPE (HCC): ICD-10-CM

## 2025-01-28 DIAGNOSIS — R11.0 NAUSEA: ICD-10-CM

## 2025-01-28 DIAGNOSIS — R53.1 WEAKNESS: ICD-10-CM

## 2025-01-28 DIAGNOSIS — E83.42 HYPOMAGNESEMIA: ICD-10-CM

## 2025-01-28 DIAGNOSIS — R06.09 DOE (DYSPNEA ON EXERTION): ICD-10-CM

## 2025-01-28 DIAGNOSIS — K83.8 DILATION OF COMMON BILE DUCT: ICD-10-CM

## 2025-01-28 DIAGNOSIS — I50.30 HEART FAILURE WITH PRESERVED EJECTION FRACTION, UNSPECIFIED HF CHRONICITY (HCC): ICD-10-CM

## 2025-01-28 DIAGNOSIS — J90 BILATERAL PLEURAL EFFUSION: ICD-10-CM

## 2025-01-28 DIAGNOSIS — K81.9 CHOLECYSTITIS: ICD-10-CM

## 2025-01-28 DIAGNOSIS — K83.8 COMMON BILE DUCT DILATATION: ICD-10-CM

## 2025-01-28 DIAGNOSIS — R10.817 GENERALIZED ABDOMINAL TENDERNESS WITHOUT REBOUND TENDERNESS: ICD-10-CM

## 2025-01-28 DIAGNOSIS — I50.30 HEART FAILURE WITH PRESERVED EJECTION FRACTION, UNSPECIFIED HF CHRONICITY (HCC): Primary | ICD-10-CM

## 2025-01-28 DIAGNOSIS — K57.32 DIVERTICULITIS OF COLON: Primary | ICD-10-CM

## 2025-01-28 DIAGNOSIS — K30 INDIGESTION: ICD-10-CM

## 2025-01-28 PROBLEM — K57.92 DIVERTICULITIS: Status: ACTIVE | Noted: 2025-01-28

## 2025-01-28 PROBLEM — R11.2 NAUSEA VOMITING AND DIARRHEA: Status: ACTIVE | Noted: 2025-01-28

## 2025-01-28 PROBLEM — R19.7 NAUSEA VOMITING AND DIARRHEA: Status: ACTIVE | Noted: 2025-01-28

## 2025-01-28 LAB
ALBUMIN SERPL-MCNC: 3.6 G/DL (ref 3.2–4.6)
ALBUMIN/GLOB SERPL: 1.2 (ref 1–1.9)
ALP SERPL-CCNC: 77 U/L (ref 35–104)
ALT SERPL-CCNC: 11 U/L (ref 8–45)
ANION GAP SERPL CALC-SCNC: 13 MMOL/L (ref 7–16)
ANION GAP SERPL CALC-SCNC: 14 MMOL/L (ref 7–16)
APPEARANCE UR: ABNORMAL
AST SERPL-CCNC: 34 U/L (ref 15–37)
BACTERIA URNS QL MICRO: ABNORMAL /HPF
BASOPHILS # BLD: 0.06 K/UL (ref 0–0.2)
BASOPHILS # BLD: 0.07 K/UL (ref 0–0.2)
BASOPHILS NFR BLD: 0.5 % (ref 0–2)
BASOPHILS NFR BLD: 0.6 % (ref 0–2)
BILIRUB SERPL-MCNC: 0.7 MG/DL (ref 0–1.2)
BILIRUB UR QL: NEGATIVE
BUN SERPL-MCNC: 11 MG/DL (ref 8–23)
BUN SERPL-MCNC: 11 MG/DL (ref 8–23)
CALCIUM SERPL-MCNC: 12 MG/DL (ref 8.8–10.2)
CALCIUM SERPL-MCNC: 12.1 MG/DL (ref 8.8–10.2)
CHLORIDE SERPL-SCNC: 95 MMOL/L (ref 98–107)
CHLORIDE SERPL-SCNC: 96 MMOL/L (ref 98–107)
CO2 SERPL-SCNC: 24 MMOL/L (ref 20–29)
CO2 SERPL-SCNC: 24 MMOL/L (ref 20–29)
COLOR UR: ABNORMAL
CREAT SERPL-MCNC: 0.78 MG/DL (ref 0.6–1.1)
CREAT SERPL-MCNC: 0.79 MG/DL (ref 0.6–1.1)
DIFFERENTIAL METHOD BLD: ABNORMAL
DIFFERENTIAL METHOD BLD: ABNORMAL
EKG ATRIAL RATE: 65 BPM
EKG DIAGNOSIS: NORMAL
EKG P AXIS: 60 DEGREES
EKG P-R INTERVAL: 208 MS
EKG Q-T INTERVAL: 386 MS
EKG QRS DURATION: 78 MS
EKG QTC CALCULATION (BAZETT): 413 MS
EKG R AXIS: -53 DEGREES
EKG T AXIS: 55 DEGREES
EKG VENTRICULAR RATE: 69 BPM
EOSINOPHIL # BLD: 0.03 K/UL (ref 0–0.8)
EOSINOPHIL # BLD: 0.06 K/UL (ref 0–0.8)
EOSINOPHIL NFR BLD: 0.3 % (ref 0.5–7.8)
EOSINOPHIL NFR BLD: 0.5 % (ref 0.5–7.8)
EPI CELLS #/AREA URNS HPF: ABNORMAL /HPF
ERYTHROCYTE [DISTWIDTH] IN BLOOD BY AUTOMATED COUNT: 16.5 % (ref 11.9–14.6)
ERYTHROCYTE [DISTWIDTH] IN BLOOD BY AUTOMATED COUNT: 18.5 % (ref 11.9–14.6)
GLOBULIN SER CALC-MCNC: 3 G/DL (ref 2.3–3.5)
GLUCOSE SERPL-MCNC: 113 MG/DL (ref 70–99)
GLUCOSE SERPL-MCNC: 122 MG/DL (ref 70–99)
GLUCOSE UR STRIP.AUTO-MCNC: NEGATIVE MG/DL
HCT VFR BLD AUTO: 37 % (ref 35.8–46.3)
HCT VFR BLD AUTO: 37.3 % (ref 35.8–46.3)
HGB BLD-MCNC: 12.4 G/DL (ref 11.7–15.4)
HGB BLD-MCNC: 12.7 G/DL (ref 11.7–15.4)
HGB UR QL STRIP: NEGATIVE
IMM GRANULOCYTES # BLD AUTO: 0.05 K/UL (ref 0–0.5)
IMM GRANULOCYTES # BLD AUTO: 0.07 K/UL (ref 0–0.5)
IMM GRANULOCYTES NFR BLD AUTO: 0.4 % (ref 0–5)
IMM GRANULOCYTES NFR BLD AUTO: 0.5 % (ref 0–5)
KETONES UR QL STRIP.AUTO: NEGATIVE MG/DL
LEUKOCYTE ESTERASE UR QL STRIP.AUTO: ABNORMAL
LIPASE SERPL-CCNC: 28 U/L (ref 13–60)
LYMPHOCYTES # BLD: 4.56 K/UL (ref 0.5–4.6)
LYMPHOCYTES # BLD: 5.03 K/UL (ref 0.5–4.6)
LYMPHOCYTES NFR BLD: 37.8 % (ref 13–44)
LYMPHOCYTES NFR BLD: 38.2 % (ref 13–44)
MAGNESIUM SERPL-MCNC: 1.4 MG/DL (ref 1.8–2.4)
MCH RBC QN AUTO: 32 PG (ref 26.1–32.9)
MCH RBC QN AUTO: 34.7 PG (ref 26.1–32.9)
MCHC RBC AUTO-ENTMCNC: 33.2 G/DL (ref 31.4–35)
MCHC RBC AUTO-ENTMCNC: 34.3 G/DL (ref 31.4–35)
MCV RBC AUTO: 101.1 FL (ref 82–102)
MCV RBC AUTO: 96.4 FL (ref 82–102)
MONOCYTES # BLD: 0.92 K/UL (ref 0.1–1.3)
MONOCYTES # BLD: 1.51 K/UL (ref 0.1–1.3)
MONOCYTES NFR BLD: 11.4 % (ref 4–12)
MONOCYTES NFR BLD: 7.7 % (ref 4–12)
NEUTS SEG # BLD: 6.32 K/UL (ref 1.7–8.2)
NEUTS SEG # BLD: 6.56 K/UL (ref 1.7–8.2)
NEUTS SEG NFR BLD: 49.3 % (ref 43–78)
NEUTS SEG NFR BLD: 52.8 % (ref 43–78)
NITRITE UR QL STRIP.AUTO: POSITIVE
NRBC # BLD: 0.05 K/UL (ref 0–0.2)
NRBC # BLD: 0.06 K/UL (ref 0–0.2)
NT PRO BNP: 2693 PG/ML (ref 0–450)
NT PRO BNP: 2866 PG/ML (ref 0–450)
OTHER OBSERVATIONS: ABNORMAL
PH UR STRIP: 6 (ref 5–9)
PLATELET # BLD AUTO: 118 K/UL (ref 150–450)
PLATELET # BLD AUTO: 130 K/UL (ref 150–450)
PMV BLD AUTO: 12.3 FL (ref 9.4–12.3)
PMV BLD AUTO: 13.6 FL (ref 9.4–12.3)
POTASSIUM SERPL-SCNC: 3.9 MMOL/L (ref 3.5–5.1)
POTASSIUM SERPL-SCNC: 4.2 MMOL/L (ref 3.5–5.1)
PROT SERPL-MCNC: 6.6 G/DL (ref 6.3–8.2)
PROT UR STRIP-MCNC: ABNORMAL MG/DL
RBC # BLD AUTO: 3.66 M/UL (ref 4.05–5.2)
RBC # BLD AUTO: 3.87 M/UL (ref 4.05–5.2)
RBC #/AREA URNS HPF: ABNORMAL /HPF
SODIUM SERPL-SCNC: 133 MMOL/L (ref 136–145)
SODIUM SERPL-SCNC: 134 MMOL/L (ref 136–145)
SP GR UR REFRACTOMETRY: 1.01 (ref 1–1.02)
TROPONIN T SERPL HS-MCNC: 33 NG/L (ref 0–14)
TROPONIN T SERPL HS-MCNC: 36 NG/L (ref 0–14)
UROBILINOGEN UR QL STRIP.AUTO: 0.2 EU/DL (ref 0.2–1)
WBC # BLD AUTO: 12 K/UL (ref 4.3–11.1)
WBC # BLD AUTO: 13.3 K/UL (ref 4.3–11.1)
WBC URNS QL MICRO: ABNORMAL /HPF

## 2025-01-28 PROCEDURE — 85025 COMPLETE CBC W/AUTO DIFF WBC: CPT

## 2025-01-28 PROCEDURE — 87040 BLOOD CULTURE FOR BACTERIA: CPT

## 2025-01-28 PROCEDURE — 6370000000 HC RX 637 (ALT 250 FOR IP): Performed by: EMERGENCY MEDICINE

## 2025-01-28 PROCEDURE — 1090F PRES/ABSN URINE INCON ASSESS: CPT | Performed by: NURSE PRACTITIONER

## 2025-01-28 PROCEDURE — 1100000000 HC RM PRIVATE

## 2025-01-28 PROCEDURE — 93010 ELECTROCARDIOGRAM REPORT: CPT | Performed by: INTERNAL MEDICINE

## 2025-01-28 PROCEDURE — 6360000004 HC RX CONTRAST MEDICATION: Performed by: EMERGENCY MEDICINE

## 2025-01-28 PROCEDURE — 74177 CT ABD & PELVIS W/CONTRAST: CPT

## 2025-01-28 PROCEDURE — 83880 ASSAY OF NATRIURETIC PEPTIDE: CPT

## 2025-01-28 PROCEDURE — 80053 COMPREHEN METABOLIC PANEL: CPT

## 2025-01-28 PROCEDURE — 76705 ECHO EXAM OF ABDOMEN: CPT

## 2025-01-28 PROCEDURE — G8427 DOCREV CUR MEDS BY ELIG CLIN: HCPCS | Performed by: NURSE PRACTITIONER

## 2025-01-28 PROCEDURE — 84484 ASSAY OF TROPONIN QUANT: CPT

## 2025-01-28 PROCEDURE — 93005 ELECTROCARDIOGRAM TRACING: CPT | Performed by: EMERGENCY MEDICINE

## 2025-01-28 PROCEDURE — 99215 OFFICE O/P EST HI 40 MIN: CPT | Performed by: NURSE PRACTITIONER

## 2025-01-28 PROCEDURE — 2580000003 HC RX 258: Performed by: EMERGENCY MEDICINE

## 2025-01-28 PROCEDURE — 71046 X-RAY EXAM CHEST 2 VIEWS: CPT

## 2025-01-28 PROCEDURE — G8417 CALC BMI ABV UP PARAM F/U: HCPCS | Performed by: NURSE PRACTITIONER

## 2025-01-28 PROCEDURE — 1036F TOBACCO NON-USER: CPT | Performed by: NURSE PRACTITIONER

## 2025-01-28 PROCEDURE — 1160F RVW MEDS BY RX/DR IN RCRD: CPT | Performed by: NURSE PRACTITIONER

## 2025-01-28 PROCEDURE — 1123F ACP DISCUSS/DSCN MKR DOCD: CPT | Performed by: NURSE PRACTITIONER

## 2025-01-28 PROCEDURE — 83690 ASSAY OF LIPASE: CPT

## 2025-01-28 PROCEDURE — 96365 THER/PROPH/DIAG IV INF INIT: CPT

## 2025-01-28 PROCEDURE — 1159F MED LIST DOCD IN RCRD: CPT | Performed by: NURSE PRACTITIONER

## 2025-01-28 PROCEDURE — 99285 EMERGENCY DEPT VISIT HI MDM: CPT

## 2025-01-28 PROCEDURE — 6360000002 HC RX W HCPCS: Performed by: EMERGENCY MEDICINE

## 2025-01-28 RX ORDER — IOPAMIDOL 755 MG/ML
100 INJECTION, SOLUTION INTRAVASCULAR
Status: COMPLETED | OUTPATIENT
Start: 2025-01-28 | End: 2025-01-28

## 2025-01-28 RX ORDER — ONDANSETRON 4 MG/1
4 TABLET, ORALLY DISINTEGRATING ORAL
Status: COMPLETED | OUTPATIENT
Start: 2025-01-28 | End: 2025-01-28

## 2025-01-28 RX ORDER — ONDANSETRON 4 MG/1
4 TABLET, FILM COATED ORAL 3 TIMES DAILY PRN
Qty: 15 TABLET | Refills: 0 | Status: ON HOLD | OUTPATIENT
Start: 2025-01-28

## 2025-01-28 RX ADMIN — ONDANSETRON 4 MG: 4 TABLET, ORALLY DISINTEGRATING ORAL at 21:28

## 2025-01-28 RX ADMIN — IOPAMIDOL 100 ML: 755 INJECTION, SOLUTION INTRAVENOUS at 19:06

## 2025-01-28 RX ADMIN — PIPERACILLIN AND TAZOBACTAM 4500 MG: 4; .5 INJECTION, POWDER, FOR SOLUTION INTRAVENOUS at 21:30

## 2025-01-28 ASSESSMENT — ENCOUNTER SYMPTOMS
SHORTNESS OF BREATH: 1
CONSTIPATION: 0
VOMITING: 0
ABDOMINAL PAIN: 1
CHEST TIGHTNESS: 0
COUGH: 0
NAUSEA: 1
DIARRHEA: 0
ABDOMINAL DISTENTION: 0

## 2025-01-28 ASSESSMENT — PAIN - FUNCTIONAL ASSESSMENT: PAIN_FUNCTIONAL_ASSESSMENT: 0-10

## 2025-01-28 NOTE — TELEPHONE ENCOUNTER
Swollen very bad and upset stomach. Dont want to eat and if she does then it goes straight thru her . Go to hospital? Please call ASAP

## 2025-01-28 NOTE — TELEPHONE ENCOUNTER
Patient son reports patient has been sick > 1 week. She is unable to keep anything in. Edema has gotten worse. Was seen by PCP today- son reports was told she is in heart failure- they ordered xray but patient was feeling too bad to get done. Advised son to take her to ER ASAP.

## 2025-01-28 NOTE — ED PROVIDER NOTES
Emergency Department Provider Note       PCP: Belkys Cagle APRN - NP   Age: 89 y.o.   Sex: female     DISPOSITION Decision To Admit 01/28/2025 10:00:38 PM    ICD-10-CM    1. Diverticulitis of colon  K57.32       2. Cholecystitis  K81.9       3. Dilation of common bile duct  K83.8       4. Acute on chronic congestive heart failure, unspecified heart failure type (HCC)  I50.9           Medical Decision Making     Patient seems to be in a mild CHF exacerbation and have worsening of chronic diarrhea.  Her abdomen is tender and warrant CT scan imaging which should be obtained.  Lab work was done prior to my evaluation of the chest x-ray and CT abdomen are added on at this time.  BNP elevated consistent with mild heart failure exacerbation.  She is not hypoxic.  She takes Lasix as needed and has not taken it recently    10:03 PM  ED workup and CT showed possible diverticulitis as well as an abnormal gallbladder and dilated gallbladder common bile duct.  Ultrasound showed essentially the same thing.  Possible mild gallbladder wall thickening but no pericholecystic fluid.  Given white blood cell count of 13 patient was treated with Zosyn for both diverticulitis and cholecystitis possibilities.  No definite stones.  May need MRCP in the hospital possible ERCP.  May need Lasix while in the hospital but patient is not hypoxic here and no respiratory distress it was withheld in the emergency department.  Hospitalist consulted for admission     1 or more acute illnesses that pose a threat to life or bodily function.   1 chronic illness with exacerbation.  Shared medical decision making was utilized in creating the patients health plan today.  I independently ordered and reviewed each unique test.    I reviewed external records: provider visit note from outside specialist.  I reviewed external records: Echocardiogram October 2023    The patients assessment required an independent historian: Family member.  The reason they

## 2025-01-28 NOTE — PROGRESS NOTES
colchicine (COLCRYS) 0.6 MG tablet Take 1-2 tablets PRN orally at the first sign of a gout flare followed by 0.6 mg (1 tablet) one hour later; MAX 1.8 mg over 1 hour (DNE 1.8 mg in 24 hours) 30 tablet 3    losartan (COZAAR) 50 MG tablet Take 0.5 tablets by mouth daily I have decreased dose so that your blood pressure does not drop too low 45 tablet 3    diclofenac sodium (VOLTAREN) 1 % GEL Apply 2 g topically 4 times daily 150 g 3    Magnesium Oxide 400 MG CAPS Take 400 mg by mouth daily 90 capsule 3    Handicap Placard MISC by Does not apply route 1 each 0    TURMERIC CURCUMIN PO Take by mouth daily      meclizine (ANTIVERT) 12.5 MG tablet Take 1 tablet by mouth 3 times daily as needed for Dizziness      cyanocobalamin 1000 MCG tablet Take 1 tablet by mouth daily      vitamin D 25 MCG (1000 UT) CAPS Take 1 capsule by mouth daily      acetaminophen (TYLENOL) 325 MG tablet Take 2 tablets by mouth every 4 hours as needed (Patient not taking: Reported on 1/28/2025)       No current facility-administered medications for this visit.        Health Maintenance/Care Gaps:   Health Maintenance   Topic Date Due    Respiratory Syncytial Virus (RSV) Pregnant or age 60 yrs+ (1 - 1-dose 75+ series) 04/04/2025 (Originally 10/13/2009)    Shingles vaccine (1 of 2) 11/11/2025 (Originally 10/13/1984)    COVID-19 Vaccine (3 - 2023-24 season) 11/11/2025 (Originally 9/1/2024)    Annual Wellness Visit (Medicare)  04/05/2025    Depression Screen  11/11/2025    DEXA (modify frequency per FRAX score)  04/18/2026    DTaP/Tdap/Td vaccine (2 - Td or Tdap) 11/11/2034    Flu vaccine  Completed    Pneumococcal 65+ years Vaccine  Completed    Hepatitis A vaccine  Aged Out    Hepatitis B vaccine  Aged Out    Hib vaccine  Aged Out    Polio vaccine  Aged Out    Meningococcal (ACWY) vaccine  Aged Out    Lipids  Discontinued       LABORATORY VALUES:         Lab Results   Component Value Date    LABA1C 6.4 (H) 11/13/2024    LABA1C 5.9 (H) 04/17/2024

## 2025-01-28 NOTE — ED TRIAGE NOTES
Pt presents to triage via WC c/o worsening SOB and LE swelling xs 2 weeks. Additionally reporting N/V/D abdominal pain w/ loss of appetite xs 1 weeks

## 2025-01-29 ENCOUNTER — APPOINTMENT (OUTPATIENT)
Dept: NUCLEAR MEDICINE | Age: 89
DRG: 444 | End: 2025-01-29
Payer: MEDICARE

## 2025-01-29 PROBLEM — E83.42 HYPOMAGNESEMIA: Status: ACTIVE | Noted: 2025-01-29

## 2025-01-29 PROBLEM — K81.0 ACUTE CHOLECYSTITIS: Status: ACTIVE | Noted: 2025-01-28

## 2025-01-29 PROBLEM — E83.52 HYPERCALCEMIA: Status: ACTIVE | Noted: 2025-01-29

## 2025-01-29 PROBLEM — Z95.0 PACEMAKER: Chronic | Status: ACTIVE | Noted: 2017-10-10

## 2025-01-29 PROBLEM — K83.8 COMMON BILE DUCT DILATATION: Status: ACTIVE | Noted: 2025-01-28

## 2025-01-29 LAB
25(OH)D3 SERPL-MCNC: 29.6 NG/ML (ref 30–100)
ALBUMIN SERPL-MCNC: 3.1 G/DL (ref 3.2–4.6)
ALBUMIN/GLOB SERPL: 1.1 (ref 1–1.9)
ALP SERPL-CCNC: 68 U/L (ref 35–104)
ALT SERPL-CCNC: 10 U/L (ref 8–45)
ANION GAP SERPL CALC-SCNC: 13 MMOL/L (ref 7–16)
AST SERPL-CCNC: 33 U/L (ref 15–37)
BILIRUB SERPL-MCNC: 0.5 MG/DL (ref 0–1.2)
BUN SERPL-MCNC: 11 MG/DL (ref 8–23)
C. DIFFICILE TOXIN MOLECULAR: NEGATIVE
CA-I BLD-MCNC: 5.8 MG/DL (ref 4–5.2)
CALCIUM SERPL-MCNC: 11.3 MG/DL (ref 8.8–10.2)
CALCIUM SERPL-MCNC: 11.8 MG/DL (ref 8.8–10.2)
CHLORIDE SERPL-SCNC: 98 MMOL/L (ref 98–107)
CO2 SERPL-SCNC: 22 MMOL/L (ref 20–29)
CREAT SERPL-MCNC: 0.83 MG/DL (ref 0.6–1.1)
ERYTHROCYTE [DISTWIDTH] IN BLOOD BY AUTOMATED COUNT: 16.6 % (ref 11.9–14.6)
GLOBULIN SER CALC-MCNC: 2.8 G/DL (ref 2.3–3.5)
GLUCOSE SERPL-MCNC: 100 MG/DL (ref 70–99)
HCT VFR BLD AUTO: 35.6 % (ref 35.8–46.3)
HGB BLD-MCNC: 11.3 G/DL (ref 11.7–15.4)
MCH RBC QN AUTO: 32.6 PG (ref 26.1–32.9)
MCHC RBC AUTO-ENTMCNC: 31.7 G/DL (ref 31.4–35)
MCV RBC AUTO: 102.6 FL (ref 82–102)
NRBC # BLD: 0.06 K/UL (ref 0–0.2)
PLATELET # BLD AUTO: 111 K/UL (ref 150–450)
PMV BLD AUTO: 12.8 FL (ref 9.4–12.3)
POTASSIUM SERPL-SCNC: 4 MMOL/L (ref 3.5–5.1)
PROT SERPL-MCNC: 5.9 G/DL (ref 6.3–8.2)
PTH-INTACT SERPL-MCNC: 19.6 PG/ML (ref 15–65)
RBC # BLD AUTO: 3.47 M/UL (ref 4.05–5.2)
SODIUM SERPL-SCNC: 133 MMOL/L (ref 136–145)
TROPONIN T SERPL HS-MCNC: 35 NG/L (ref 0–14)
WBC # BLD AUTO: 14 K/UL (ref 4.3–11.1)

## 2025-01-29 PROCEDURE — 1100000000 HC RM PRIVATE

## 2025-01-29 PROCEDURE — 87899 AGENT NOS ASSAY W/OPTIC: CPT

## 2025-01-29 PROCEDURE — 94760 N-INVAS EAR/PLS OXIMETRY 1: CPT

## 2025-01-29 PROCEDURE — 82652 VIT D 1 25-DIHYDROXY: CPT

## 2025-01-29 PROCEDURE — 97530 THERAPEUTIC ACTIVITIES: CPT

## 2025-01-29 PROCEDURE — 87046 STOOL CULTR AEROBIC BACT EA: CPT

## 2025-01-29 PROCEDURE — 94761 N-INVAS EAR/PLS OXIMETRY MLT: CPT

## 2025-01-29 PROCEDURE — 2580000003 HC RX 258: Performed by: STUDENT IN AN ORGANIZED HEALTH CARE EDUCATION/TRAINING PROGRAM

## 2025-01-29 PROCEDURE — 2580000003 HC RX 258: Performed by: FAMILY MEDICINE

## 2025-01-29 PROCEDURE — 2700000000 HC OXYGEN THERAPY PER DAY

## 2025-01-29 PROCEDURE — A9537 TC99M MEBROFENIN: HCPCS | Performed by: NURSE PRACTITIONER

## 2025-01-29 PROCEDURE — 6370000000 HC RX 637 (ALT 250 FOR IP): Performed by: STUDENT IN AN ORGANIZED HEALTH CARE EDUCATION/TRAINING PROGRAM

## 2025-01-29 PROCEDURE — 3430000000 HC RX DIAGNOSTIC RADIOPHARMACEUTICAL: Performed by: NURSE PRACTITIONER

## 2025-01-29 PROCEDURE — 80053 COMPREHEN METABOLIC PANEL: CPT

## 2025-01-29 PROCEDURE — 97161 PT EVAL LOW COMPLEX 20 MIN: CPT

## 2025-01-29 PROCEDURE — 6360000004 HC RX CONTRAST MEDICATION: Performed by: NURSE PRACTITIONER

## 2025-01-29 PROCEDURE — 97535 SELF CARE MNGMENT TRAINING: CPT

## 2025-01-29 PROCEDURE — 87493 C DIFF AMPLIFIED PROBE: CPT

## 2025-01-29 PROCEDURE — 6360000002 HC RX W HCPCS: Performed by: STUDENT IN AN ORGANIZED HEALTH CARE EDUCATION/TRAINING PROGRAM

## 2025-01-29 PROCEDURE — 82306 VITAMIN D 25 HYDROXY: CPT

## 2025-01-29 PROCEDURE — 97165 OT EVAL LOW COMPLEX 30 MIN: CPT

## 2025-01-29 PROCEDURE — 87045 FECES CULTURE AEROBIC BACT: CPT

## 2025-01-29 PROCEDURE — 78227 HEPATOBIL SYST IMAGE W/DRUG: CPT

## 2025-01-29 PROCEDURE — 6360000002 HC RX W HCPCS: Performed by: FAMILY MEDICINE

## 2025-01-29 PROCEDURE — 2500000003 HC RX 250 WO HCPCS: Performed by: STUDENT IN AN ORGANIZED HEALTH CARE EDUCATION/TRAINING PROGRAM

## 2025-01-29 PROCEDURE — 85027 COMPLETE CBC AUTOMATED: CPT

## 2025-01-29 PROCEDURE — 87427 SHIGA-LIKE TOXIN AG IA: CPT

## 2025-01-29 PROCEDURE — 83970 ASSAY OF PARATHORMONE: CPT

## 2025-01-29 PROCEDURE — 82330 ASSAY OF CALCIUM: CPT

## 2025-01-29 PROCEDURE — 36415 COLL VENOUS BLD VENIPUNCTURE: CPT

## 2025-01-29 RX ORDER — ALLOPURINOL 100 MG/1
100 TABLET ORAL DAILY
Status: DISCONTINUED | OUTPATIENT
Start: 2025-01-29 | End: 2025-02-09 | Stop reason: HOSPADM

## 2025-01-29 RX ORDER — LOSARTAN POTASSIUM 25 MG/1
25 TABLET ORAL DAILY
Status: DISCONTINUED | OUTPATIENT
Start: 2025-01-29 | End: 2025-02-03

## 2025-01-29 RX ORDER — SODIUM CHLORIDE 0.9 % (FLUSH) 0.9 %
5-40 SYRINGE (ML) INJECTION PRN
Status: DISCONTINUED | OUTPATIENT
Start: 2025-01-29 | End: 2025-02-09 | Stop reason: HOSPADM

## 2025-01-29 RX ORDER — KIT FOR THE PREPARATION OF TECHNETIUM TC 99M MEBROFENIN 45 MG/10ML
5.5 INJECTION, POWDER, LYOPHILIZED, FOR SOLUTION INTRAVENOUS AS NEEDED
Status: DISCONTINUED | OUTPATIENT
Start: 2025-01-29 | End: 2025-02-09 | Stop reason: HOSPADM

## 2025-01-29 RX ORDER — ONDANSETRON 4 MG/1
4 TABLET, ORALLY DISINTEGRATING ORAL EVERY 8 HOURS PRN
Status: DISCONTINUED | OUTPATIENT
Start: 2025-01-29 | End: 2025-02-09 | Stop reason: HOSPADM

## 2025-01-29 RX ORDER — SINCALIDE 5 UG/5ML
0.02 INJECTION, POWDER, LYOPHILIZED, FOR SOLUTION INTRAVENOUS ONCE
Status: COMPLETED | OUTPATIENT
Start: 2025-01-29 | End: 2025-01-29

## 2025-01-29 RX ORDER — MAGNESIUM SULFATE IN WATER 40 MG/ML
2000 INJECTION, SOLUTION INTRAVENOUS ONCE
Status: COMPLETED | OUTPATIENT
Start: 2025-01-29 | End: 2025-01-29

## 2025-01-29 RX ORDER — FUROSEMIDE 10 MG/ML
20 INJECTION INTRAMUSCULAR; INTRAVENOUS DAILY
Status: DISCONTINUED | OUTPATIENT
Start: 2025-01-29 | End: 2025-01-30

## 2025-01-29 RX ORDER — SODIUM CHLORIDE 9 MG/ML
INJECTION, SOLUTION INTRAVENOUS PRN
Status: DISCONTINUED | OUTPATIENT
Start: 2025-01-29 | End: 2025-02-09 | Stop reason: HOSPADM

## 2025-01-29 RX ORDER — ACETAMINOPHEN 650 MG/1
650 SUPPOSITORY RECTAL EVERY 6 HOURS PRN
Status: DISCONTINUED | OUTPATIENT
Start: 2025-01-29 | End: 2025-02-09 | Stop reason: HOSPADM

## 2025-01-29 RX ORDER — SODIUM CHLORIDE 0.9 % (FLUSH) 0.9 %
5-40 SYRINGE (ML) INJECTION EVERY 12 HOURS SCHEDULED
Status: DISCONTINUED | OUTPATIENT
Start: 2025-01-29 | End: 2025-02-09 | Stop reason: HOSPADM

## 2025-01-29 RX ORDER — SODIUM CHLORIDE 9 MG/ML
INJECTION, SOLUTION INTRAVENOUS CONTINUOUS
Status: DISCONTINUED | OUTPATIENT
Start: 2025-01-29 | End: 2025-01-29

## 2025-01-29 RX ORDER — ACETAMINOPHEN 325 MG/1
650 TABLET ORAL EVERY 6 HOURS PRN
Status: DISCONTINUED | OUTPATIENT
Start: 2025-01-29 | End: 2025-02-09 | Stop reason: HOSPADM

## 2025-01-29 RX ORDER — ONDANSETRON 2 MG/ML
4 INJECTION INTRAMUSCULAR; INTRAVENOUS EVERY 6 HOURS PRN
Status: DISCONTINUED | OUTPATIENT
Start: 2025-01-29 | End: 2025-02-09 | Stop reason: HOSPADM

## 2025-01-29 RX ADMIN — PIPERACILLIN AND TAZOBACTAM 3375 MG: 3; .375 INJECTION, POWDER, LYOPHILIZED, FOR SOLUTION INTRAVENOUS at 15:45

## 2025-01-29 RX ADMIN — MAGNESIUM SULFATE HEPTAHYDRATE 2000 MG: 40 INJECTION, SOLUTION INTRAVENOUS at 15:47

## 2025-01-29 RX ADMIN — SODIUM CHLORIDE 40 MG: 9 INJECTION INTRAMUSCULAR; INTRAVENOUS; SUBCUTANEOUS at 02:50

## 2025-01-29 RX ADMIN — SODIUM CHLORIDE: 9 INJECTION, SOLUTION INTRAVENOUS at 15:44

## 2025-01-29 RX ADMIN — MEBROFENIN 5.5 MILLICURIE: 45 INJECTION, POWDER, LYOPHILIZED, FOR SOLUTION INTRAVENOUS at 12:10

## 2025-01-29 RX ADMIN — FUROSEMIDE 20 MG: 10 INJECTION, SOLUTION INTRAMUSCULAR; INTRAVENOUS at 20:41

## 2025-01-29 RX ADMIN — SINCALIDE 1.38 MCG: 5 INJECTION, POWDER, LYOPHILIZED, FOR SOLUTION INTRAVENOUS at 13:30

## 2025-01-29 RX ADMIN — LOSARTAN POTASSIUM 25 MG: 25 TABLET, FILM COATED ORAL at 08:49

## 2025-01-29 RX ADMIN — SODIUM CHLORIDE 40 MG: 9 INJECTION INTRAMUSCULAR; INTRAVENOUS; SUBCUTANEOUS at 15:41

## 2025-01-29 RX ADMIN — SODIUM CHLORIDE, PRESERVATIVE FREE 10 ML: 5 INJECTION INTRAVENOUS at 20:41

## 2025-01-29 RX ADMIN — PIPERACILLIN AND TAZOBACTAM 3375 MG: 3; .375 INJECTION, POWDER, LYOPHILIZED, FOR SOLUTION INTRAVENOUS at 02:52

## 2025-01-29 RX ADMIN — ALLOPURINOL 100 MG: 100 TABLET ORAL at 08:49

## 2025-01-29 NOTE — ED NOTES
Report given to AUNG Chaudhary to assume care at this time.       Richie Pagan, RN  01/29/25 1023

## 2025-01-29 NOTE — CONSULTS
Consult Note            Date:1/29/2025        Patient Name:Jacklyn Castillo     YOB: 1934     Age:89 y.o.    Inpatient consult to GI  Consult performed by: Cindy Choudhary APRN - CNP  Consult ordered by: Cr Yeung MD          Chief Complaint     Chief Complaint   Patient presents with    Shortness of Breath        History Obtained From   patient    History of Present Illness   Jacklyn Castillo is a 89 yo female who presented to the ED with nausea, abdominal pain and diarrhea. PMH includes colon cancer, HTN. GI was consulted for prominent CBD. Patient states for about a month now she has had ongoing nausea and abdominal pain but the last week and half it has really worsened. She states she has never vomited but has constant nausea where she feels like she might. She has diarrhea as well and typically mid to left side abdominal pain but right now there is pain throughout. She denies any history of liver disease or gallbladder disease. She denies blood thinner or NSAID use. She denies alcohol or tobacco use. CT AP showed gallbladder distention with mild intrahepatic biliary ductal dilation. Ultrasound showed nonspecific moderate intrahepatic biliary ductal dilation with the CBD at 8mm as well as gallbladder sludge with distention and wall thickening. TB/LFT's normal.     Labs: Hgb 11.3, Hct 35.6, Plt 111, Bili 0.5, ALT 10, AST 33, Alk Phos 68    Medications: PPI BID    Imaging: US ABDOMEN LIMITED Specify organ? GALLBLADDER    Result Date: 1/28/2025  Findings/impression: Nonspecific moderate intrahepatic biliary ductal dilatation. Common bile duct prominent at 8 mm. Gallbladder sludge with distention and wall thickening measuring up to 3 mm is noted. No stones are seen. Negative sonographic Norton sign. Further clinical correlation is advised. Moderate right-sided hydronephrosis is identified. Clinical and imaging surveillance is advised. Aorta and IVC are unremarkable.     CT ABDOMEN

## 2025-01-29 NOTE — PERIOP NOTE
2nd floor staff informed of patient procedure on 1/30 with Dr. Corea. Pre-op arrival of 1300 for procedure time of 1430.

## 2025-01-29 NOTE — CONSULTS
H&P/Consult Note/Progress Note/Office Note:   Jacklyn Castillo  MRN: [id number]  :10/13/1934  Age:89 y.o.    HPI: Jacklyn Castillo is a 89 y.o. female who we are asked by IM to see for abdominal pain with CT evidence of GB distension, ductal dilation and diverticulitis. The patient has a PMHx of Hx of colorectal  cancer 2007 s/p resection with ostomy then reversal & chemotherapy, HTN, HLD, bradycardia s/p PPM, thromboembolus RUE years ago. She presented with a reported month long history of nausea, vomiting and watery diarrhea--all symptoms that have worsened over the past week. She was admitted on 2025 for shortness of breath with elevated BNP 2866.  Additionally, she has a UTI.      CT abd/pelvis and US gallbladder reviewed as below.     The patient describes ill- defined abdominal symptoms.  No RUQ pain described.  Nausea mostly with lower abdominal pain.  Never or chills. No sick family members at home.      Abdominal Surgery Hx: Colorectal malignancy s/p total colectomy  with ostomy/reversal, Hysterectomy.   Anticoagulation: none prior to admission    Admission labs: WBC 13.3-->14.0 today  Hgb 12.4, plts 118 (111 today)  Na 133, LFTs unremarkable        Past Medical History:   Diagnosis Date    Bradycardia      Biotronik pacemaker left chest, [facility] Cardiology follows; last in person interrogation 21 pacer dependent, last remote interrogation 22    Cancer (HCC)     colorectal cancer    Essential hypertension     managed with med    HLD (hyperlipidemia)     Hx of echocardiogram 2017    EF 64.5%    Nausea & vomiting     medications relieved    Osteopenia     Sciatica     Thromboembolus (HCC)     right arm, due to chemo 15 years ago     Past Surgical History:   Procedure Laterality Date    CARPAL TUNNEL RELEASE Left 2022    CATARACT REMOVAL Bilateral 2018    COLONOSCOPY N/A 2018    COLONOSCOPY  BMI 28 performed by Camden Engle MD at [facility] ENDOSCOPY

## 2025-01-29 NOTE — ED NOTES
Report received from Kaylee HORAN to assume care at this time.       Corey Frazier, RN  01/29/25 2277

## 2025-01-29 NOTE — H&P
PM   Result Value Ref Range    Lipase 28 13 - 60 U/L   CBC with Auto Differential    Collection Time: 01/28/25  5:31 PM   Result Value Ref Range    WBC 13.3 (H) 4.3 - 11.1 K/uL    RBC 3.87 (L) 4.05 - 5.2 M/uL    Hemoglobin 12.4 11.7 - 15.4 g/dL    Hematocrit 37.3 35.8 - 46.3 %    MCV 96.4 82 - 102 FL    MCH 32.0 26.1 - 32.9 PG    MCHC 33.2 31.4 - 35.0 g/dL    RDW 16.5 (H) 11.9 - 14.6 %    Platelets 118 (L) 150 - 450 K/uL    MPV 12.3 9.4 - 12.3 FL    nRBC 0.06 0.0 - 0.2 K/uL    Differential Type AUTOMATED      Neutrophils % 49.3 43.0 - 78.0 %    Lymphocytes % 37.8 13.0 - 44.0 %    Monocytes % 11.4 4.0 - 12.0 %    Eosinophils % 0.5 0.5 - 7.8 %    Basophils % 0.5 0.0 - 2.0 %    Immature Granulocytes % 0.5 0.0 - 5.0 %    Neutrophils Absolute 6.56 1.70 - 8.20 K/UL    Lymphocytes Absolute 5.03 (H) 0.50 - 4.60 K/UL    Monocytes Absolute 1.51 (H) 0.10 - 1.30 K/UL    Eosinophils Absolute 0.06 0.00 - 0.80 K/UL    Basophils Absolute 0.06 0.00 - 0.20 K/UL    Immature Granulocytes Absolute 0.07 0.0 - 0.5 K/UL   Comprehensive Metabolic Panel w/ Reflex to MG    Collection Time: 01/28/25  5:31 PM   Result Value Ref Range    Sodium 133 (L) 136 - 145 mmol/L    Potassium 3.9 3.5 - 5.1 mmol/L    Chloride 95 (L) 98 - 107 mmol/L    CO2 24 20 - 29 mmol/L    Anion Gap 14 7 - 16 mmol/L    Glucose 113 (H) 70 - 99 mg/dL    BUN 11 8 - 23 MG/DL    Creatinine 0.79 0.60 - 1.10 MG/DL    Est, Glom Filt Rate 71 >60 ml/min/1.73m2    Calcium 12.1 (H) 8.8 - 10.2 MG/DL    Total Bilirubin 0.7 0.0 - 1.2 MG/DL    ALT 11 8 - 45 U/L    AST 34 15 - 37 U/L    Alk Phosphatase 77 35 - 104 U/L    Total Protein 6.6 6.3 - 8.2 g/dL    Albumin 3.6 3.2 - 4.6 g/dL    Globulin 3.0 2.3 - 3.5 g/dL    Albumin/Globulin Ratio 1.2 1.0 - 1.9     Brain Natriuretic Peptide    Collection Time: 01/28/25  5:31 PM   Result Value Ref Range    NT Pro-BNP 2,866 (H) 0 - 450 PG/ML   Troponin    Collection Time: 01/28/25  5:31 PM   Result Value Ref Range    Troponin T 33.0 (H) 0 - 14 ng/L

## 2025-01-29 NOTE — ED NOTES
Report received from AUNG Nicole to assume care at this time.       Richie Pagan RN  01/29/25 0718

## 2025-01-30 ENCOUNTER — ANESTHESIA (OUTPATIENT)
Dept: ENDOSCOPY | Age: 89
End: 2025-01-30
Payer: MEDICARE

## 2025-01-30 ENCOUNTER — APPOINTMENT (OUTPATIENT)
Dept: GENERAL RADIOLOGY | Age: 89
DRG: 444 | End: 2025-01-30
Payer: MEDICARE

## 2025-01-30 ENCOUNTER — ANESTHESIA EVENT (OUTPATIENT)
Dept: ENDOSCOPY | Age: 89
End: 2025-01-30
Payer: MEDICARE

## 2025-01-30 LAB
1,25(OH)2D3 SERPL-MCNC: 23.6 PG/ML (ref 24.8–81.5)
ALBUMIN SERPL-MCNC: 3.1 G/DL (ref 3.2–4.6)
ALBUMIN/GLOB SERPL: 1.1 (ref 1–1.9)
ALP SERPL-CCNC: 69 U/L (ref 35–104)
ALT SERPL-CCNC: 8 U/L (ref 8–45)
ANION GAP SERPL CALC-SCNC: 13 MMOL/L (ref 7–16)
AST SERPL-CCNC: 29 U/L (ref 15–37)
BASOPHILS # BLD: 0.05 K/UL (ref 0–0.2)
BASOPHILS NFR BLD: 0.4 % (ref 0–2)
BILIRUB SERPL-MCNC: 0.5 MG/DL (ref 0–1.2)
BUN SERPL-MCNC: 11 MG/DL (ref 8–23)
CALCIUM SERPL-MCNC: 11.4 MG/DL (ref 8.8–10.2)
CHLORIDE SERPL-SCNC: 98 MMOL/L (ref 98–107)
CO2 SERPL-SCNC: 24 MMOL/L (ref 20–29)
CREAT SERPL-MCNC: 0.98 MG/DL (ref 0.6–1.1)
DIFFERENTIAL METHOD BLD: ABNORMAL
EOSINOPHIL # BLD: 0.3 K/UL (ref 0–0.8)
EOSINOPHIL NFR BLD: 2.3 % (ref 0.5–7.8)
ERYTHROCYTE [DISTWIDTH] IN BLOOD BY AUTOMATED COUNT: 16.6 % (ref 11.9–14.6)
GLOBULIN SER CALC-MCNC: 2.7 G/DL (ref 2.3–3.5)
GLUCOSE SERPL-MCNC: 90 MG/DL (ref 70–99)
HCT VFR BLD AUTO: 34.3 % (ref 35.8–46.3)
HGB BLD-MCNC: 11.2 G/DL (ref 11.7–15.4)
IMM GRANULOCYTES # BLD AUTO: 0.05 K/UL (ref 0–0.5)
IMM GRANULOCYTES NFR BLD AUTO: 0.4 % (ref 0–5)
LYMPHOCYTES # BLD: 5.7 K/UL (ref 0.5–4.6)
LYMPHOCYTES NFR BLD: 43.2 % (ref 13–44)
MAGNESIUM SERPL-MCNC: 1.6 MG/DL (ref 1.8–2.4)
MCH RBC QN AUTO: 32.7 PG (ref 26.1–32.9)
MCHC RBC AUTO-ENTMCNC: 32.7 G/DL (ref 31.4–35)
MCV RBC AUTO: 100.3 FL (ref 82–102)
MONOCYTES # BLD: 1.52 K/UL (ref 0.1–1.3)
MONOCYTES NFR BLD: 11.5 % (ref 4–12)
NEUTS SEG # BLD: 5.57 K/UL (ref 1.7–8.2)
NEUTS SEG NFR BLD: 42.2 % (ref 43–78)
NRBC # BLD: 0.03 K/UL (ref 0–0.2)
PLATELET # BLD AUTO: 112 K/UL (ref 150–450)
PLATELET COMMENT: ABNORMAL
PMV BLD AUTO: 12.6 FL (ref 9.4–12.3)
POTASSIUM SERPL-SCNC: 3.6 MMOL/L (ref 3.5–5.1)
PROT SERPL-MCNC: 5.7 G/DL (ref 6.3–8.2)
RBC # BLD AUTO: 3.42 M/UL (ref 4.05–5.2)
RBC MORPH BLD: ABNORMAL
SODIUM SERPL-SCNC: 134 MMOL/L (ref 136–145)
WBC # BLD AUTO: 13.2 K/UL (ref 4.3–11.1)
WBC MORPH BLD: ABNORMAL

## 2025-01-30 PROCEDURE — 7100000000 HC PACU RECOVERY - FIRST 15 MIN: Performed by: INTERNAL MEDICINE

## 2025-01-30 PROCEDURE — 0F9480Z DRAINAGE OF GALLBLADDER WITH DRAINAGE DEVICE, VIA NATURAL OR ARTIFICIAL OPENING ENDOSCOPIC: ICD-10-PCS | Performed by: INTERNAL MEDICINE

## 2025-01-30 PROCEDURE — 0DH63UZ INSERTION OF FEEDING DEVICE INTO STOMACH, PERCUTANEOUS APPROACH: ICD-10-PCS | Performed by: INTERNAL MEDICINE

## 2025-01-30 PROCEDURE — 2500000003 HC RX 250 WO HCPCS: Performed by: PHYSICIAN ASSISTANT

## 2025-01-30 PROCEDURE — 3700000000 HC ANESTHESIA ATTENDED CARE: Performed by: INTERNAL MEDICINE

## 2025-01-30 PROCEDURE — 76937 US GUIDE VASCULAR ACCESS: CPT

## 2025-01-30 PROCEDURE — 36415 COLL VENOUS BLD VENIPUNCTURE: CPT

## 2025-01-30 PROCEDURE — 6360000002 HC RX W HCPCS: Performed by: STUDENT IN AN ORGANIZED HEALTH CARE EDUCATION/TRAINING PROGRAM

## 2025-01-30 PROCEDURE — 6370000000 HC RX 637 (ALT 250 FOR IP): Performed by: STUDENT IN AN ORGANIZED HEALTH CARE EDUCATION/TRAINING PROGRAM

## 2025-01-30 PROCEDURE — 2580000003 HC RX 258: Performed by: NURSE ANESTHETIST, CERTIFIED REGISTERED

## 2025-01-30 PROCEDURE — 83735 ASSAY OF MAGNESIUM: CPT

## 2025-01-30 PROCEDURE — 3609018500 HC EGD US SCOPE W/ADJACENT STRUCTURES: Performed by: INTERNAL MEDICINE

## 2025-01-30 PROCEDURE — 85025 COMPLETE CBC W/AUTO DIFF WBC: CPT

## 2025-01-30 PROCEDURE — 1100000000 HC RM PRIVATE

## 2025-01-30 PROCEDURE — 2580000003 HC RX 258: Performed by: STUDENT IN AN ORGANIZED HEALTH CARE EDUCATION/TRAINING PROGRAM

## 2025-01-30 PROCEDURE — 97530 THERAPEUTIC ACTIVITIES: CPT

## 2025-01-30 PROCEDURE — C1874 STENT, COATED/COV W/DEL SYS: HCPCS | Performed by: INTERNAL MEDICINE

## 2025-01-30 PROCEDURE — 6360000002 HC RX W HCPCS: Performed by: PHYSICIAN ASSISTANT

## 2025-01-30 PROCEDURE — 7100000001 HC PACU RECOVERY - ADDTL 15 MIN: Performed by: INTERNAL MEDICINE

## 2025-01-30 PROCEDURE — 2709999900 HC NON-CHARGEABLE SUPPLY: Performed by: INTERNAL MEDICINE

## 2025-01-30 PROCEDURE — 2500000003 HC RX 250 WO HCPCS: Performed by: STUDENT IN AN ORGANIZED HEALTH CARE EDUCATION/TRAINING PROGRAM

## 2025-01-30 PROCEDURE — C1753 CATH, INTRAVAS ULTRASOUND: HCPCS | Performed by: INTERNAL MEDICINE

## 2025-01-30 PROCEDURE — 3700000001 HC ADD 15 MINUTES (ANESTHESIA): Performed by: INTERNAL MEDICINE

## 2025-01-30 PROCEDURE — 3E0G76Z INTRODUCTION OF NUTRITIONAL SUBSTANCE INTO UPPER GI, VIA NATURAL OR ARTIFICIAL OPENING: ICD-10-PCS | Performed by: INTERNAL MEDICINE

## 2025-01-30 PROCEDURE — 6360000002 HC RX W HCPCS: Performed by: FAMILY MEDICINE

## 2025-01-30 PROCEDURE — 6360000002 HC RX W HCPCS: Performed by: NURSE ANESTHETIST, CERTIFIED REGISTERED

## 2025-01-30 PROCEDURE — 80053 COMPREHEN METABOLIC PANEL: CPT

## 2025-01-30 PROCEDURE — 43240 EGD W/TRANSMURAL DRAIN CYST: CPT | Performed by: INTERNAL MEDICINE

## 2025-01-30 DEVICE — STENT AND ELECTROCAUTERY - ENHANCED DELIVERY SYSTEM
Type: IMPLANTABLE DEVICE | Site: GALLBLADDER | Status: FUNCTIONAL
Brand: AXIOS™

## 2025-01-30 RX ORDER — METRONIDAZOLE 500 MG/100ML
500 INJECTION, SOLUTION INTRAVENOUS EVERY 12 HOURS
Status: COMPLETED | OUTPATIENT
Start: 2025-01-30 | End: 2025-02-07

## 2025-01-30 RX ORDER — FUROSEMIDE 20 MG/1
20 TABLET ORAL DAILY
Status: DISCONTINUED | OUTPATIENT
Start: 2025-01-31 | End: 2025-02-03

## 2025-01-30 RX ORDER — SODIUM CHLORIDE, SODIUM LACTATE, POTASSIUM CHLORIDE, CALCIUM CHLORIDE 600; 310; 30; 20 MG/100ML; MG/100ML; MG/100ML; MG/100ML
INJECTION, SOLUTION INTRAVENOUS
Status: DISCONTINUED | OUTPATIENT
Start: 2025-01-30 | End: 2025-01-30 | Stop reason: SDUPTHER

## 2025-01-30 RX ORDER — ONDANSETRON 2 MG/ML
4 INJECTION INTRAMUSCULAR; INTRAVENOUS EVERY 6 HOURS PRN
Status: DISCONTINUED | OUTPATIENT
Start: 2025-01-30 | End: 2025-01-30 | Stop reason: SDUPTHER

## 2025-01-30 RX ORDER — MAGNESIUM SULFATE IN WATER 40 MG/ML
2000 INJECTION, SOLUTION INTRAVENOUS ONCE
Status: COMPLETED | OUTPATIENT
Start: 2025-01-30 | End: 2025-01-30

## 2025-01-30 RX ORDER — PROPOFOL 10 MG/ML
INJECTION, EMULSION INTRAVENOUS
Status: DISCONTINUED | OUTPATIENT
Start: 2025-01-30 | End: 2025-01-30 | Stop reason: SDUPTHER

## 2025-01-30 RX ADMIN — PROPOFOL 150 MCG/KG/MIN: 10 INJECTION, EMULSION INTRAVENOUS at 17:43

## 2025-01-30 RX ADMIN — SODIUM CHLORIDE, PRESERVATIVE FREE 10 ML: 5 INJECTION INTRAVENOUS at 09:18

## 2025-01-30 RX ADMIN — ACETAMINOPHEN 650 MG: 325 TABLET ORAL at 22:52

## 2025-01-30 RX ADMIN — PIPERACILLIN AND TAZOBACTAM 3375 MG: 3; .375 INJECTION, POWDER, LYOPHILIZED, FOR SOLUTION INTRAVENOUS at 04:53

## 2025-01-30 RX ADMIN — WATER 1000 MG: 1 INJECTION INTRAMUSCULAR; INTRAVENOUS; SUBCUTANEOUS at 11:27

## 2025-01-30 RX ADMIN — PROPOFOL 50 MG: 10 INJECTION, EMULSION INTRAVENOUS at 17:42

## 2025-01-30 RX ADMIN — MAGNESIUM SULFATE HEPTAHYDRATE 2000 MG: 40 INJECTION, SOLUTION INTRAVENOUS at 09:24

## 2025-01-30 RX ADMIN — SODIUM CHLORIDE 40 MG: 9 INJECTION INTRAMUSCULAR; INTRAVENOUS; SUBCUTANEOUS at 02:59

## 2025-01-30 RX ADMIN — SODIUM CHLORIDE 40 MG: 9 INJECTION INTRAMUSCULAR; INTRAVENOUS; SUBCUTANEOUS at 16:01

## 2025-01-30 RX ADMIN — METRONIDAZOLE 500 MG: 500 INJECTION, SOLUTION INTRAVENOUS at 11:33

## 2025-01-30 RX ADMIN — ALLOPURINOL 100 MG: 100 TABLET ORAL at 09:18

## 2025-01-30 RX ADMIN — FUROSEMIDE 20 MG: 10 INJECTION, SOLUTION INTRAMUSCULAR; INTRAVENOUS at 09:17

## 2025-01-30 RX ADMIN — SODIUM CHLORIDE, SODIUM LACTATE, POTASSIUM CHLORIDE, AND CALCIUM CHLORIDE: 600; 310; 30; 20 INJECTION, SOLUTION INTRAVENOUS at 17:32

## 2025-01-30 RX ADMIN — LOSARTAN POTASSIUM 25 MG: 25 TABLET, FILM COATED ORAL at 09:18

## 2025-01-30 ASSESSMENT — PAIN SCALES - GENERAL
PAINLEVEL_OUTOF10: 3
PAINLEVEL_OUTOF10: 0

## 2025-01-30 ASSESSMENT — PAIN DESCRIPTION - DESCRIPTORS: DESCRIPTORS: DULL

## 2025-01-30 ASSESSMENT — PAIN DESCRIPTION - LOCATION: LOCATION: ABDOMEN

## 2025-01-30 ASSESSMENT — PAIN - FUNCTIONAL ASSESSMENT
PAIN_FUNCTIONAL_ASSESSMENT: 0-10
PAIN_FUNCTIONAL_ASSESSMENT: ACTIVITIES ARE NOT PREVENTED
PAIN_FUNCTIONAL_ASSESSMENT: 0-10
PAIN_FUNCTIONAL_ASSESSMENT: NONE - DENIES PAIN

## 2025-01-30 NOTE — ACP (ADVANCE CARE PLANNING)
Advance Care Planning   Healthcare Decision Maker:    Primary Decision Maker: Walker Castillo - [facility] - [phone]    Click here to complete Healthcare Decision Makers including selection of the Healthcare Decision Maker Relationship (ie \"Primary\").

## 2025-01-30 NOTE — PLAN OF CARE
Problem: Discharge Planning  Goal: Discharge to home or other facility with appropriate resources  1/30/2025 0753 by Nahomi Mason, RN  Outcome: Progressing  1/30/2025 0519 by Agustina Bess, RN  Outcome: Progressing     Problem: Safety - Adult  Goal: Free from fall injury  1/30/2025 0753 by Nahomi Mason, RN  Outcome: Progressing  1/30/2025 0519 by Agustina Bess, RN  Outcome: Progressing

## 2025-01-30 NOTE — ANESTHESIA PRE PROCEDURE
size is normal. Normal wall thickness. Normal wall motion. Abnormal diastolic function.  ·  Aortic Valve: Moderately thickened right cusp. Moderately calcified right cusp. Trace regurgitation. Mild stenosis of the aortic valve. AV mean gradient is 15 mmHg. AV area by continuity VTI is 1.5 cm2.  ·  Mitral Valve: Mild annular calcification of the mitral valve. Mild regurgitation.  ·  Tricuspid Valve: Mild regurgitation. Mildly elevated RVSP. The estimated RVSP is 39 mmHg.       Neuro/Psych:   Negative Neuro/Psych ROS              GI/Hepatic/Renal: Neg GI/Hepatic/Renal ROS            Endo/Other:    (+) malignancy/cancer (colon cancer).                 Abdominal:             Vascular: negative vascular ROS.         Other Findings:           Anesthesia Plan      TIVA     ASA 3       Induction: intravenous.      Anesthetic plan and risks discussed with patient.    Use of blood products discussed with whom consented to blood products.    Plan discussed with CRNA.                    LYNN FLOYD JR, MD   1/30/2025

## 2025-01-30 NOTE — PERIOP NOTE
TRANSFER - OUT REPORT:    Verbal report given to Nahomi HORAN on Jacklyn Castillo  being transferred to [facility] for routine post-op       Report consisted of patient’s Situation, Background, Assessment and   Recommendations(SBAR).     Information from the following report(s) Nurse Handoff Report, Adult Overview, Surgery Report, Intake/Output, and MAR was reviewed with the receiving nurse.    Lines:   Peripheral IV 01/28/25 Left Antecubital (Active)   Site Assessment Clean, dry & intact 01/30/25 1809   Line Status Flushed 01/30/25 1809   Line Care Connections checked and tightened 01/30/25 1809   Phlebitis Assessment No symptoms 01/30/25 1809   Infiltration Assessment 0 01/30/25 1809   Alcohol Cap Used No 01/30/25 1809   Dressing Status Clean, dry & intact 01/30/25 1809   Dressing Type Transparent 01/30/25 1809   Dressing Intervention New 01/28/25 1730        Opportunity for questions and clarification was provided.      Patient transported with:   O2 @ 4 liters    VTE prophylaxis orders have been written for Jacklyn Castillo.    Patient and family given floor number and nurses name.  Family updated re: pt status after security code verified.

## 2025-01-30 NOTE — CARE COORDINATION
RNCM met with patient and son in room 216 to discuss discharge planning.    Patient lives with spouse in a one level home with 3 steps for entry. Patient completes ADLs independently at baseline and ambulates with a cane or rollator. Patient has no current home care services. Demographics and PCP verified.    PT/OT recommend home health at discharge. Patient given home health choice list for review. CM following.       01/30/25 1020   Service Assessment   Patient Orientation Alert and Oriented   Cognition Alert   History Provided By Patient   Primary Caregiver Self   Accompanied By/Relationship Son   Support Systems Spouse/Significant Other;Children;Family Members   Patient's Healthcare Decision Maker is: Named in Scanned ACP Document   PCP Verified by CM Yes   Last Visit to PCP Within last 3 months   Prior Functional Level Independent in ADLs/IADLs   Current Functional Level Independent in ADLs/IADLs   Can patient return to prior living arrangement Yes   Ability to make needs known: Good   Family able to assist with home care needs: Yes   Would you like for me to discuss the discharge plan with any other family members/significant others, and if so, who? Yes  (Spouse and children)   Financial Resources Medicare   Community Resources None   Social/Functional History   Lives With Spouse   Type of Home House   Home Layout One level   Home Access Stairs to enter with rails   Entrance Stairs - Number of Steps 3   Home Equipment Cane;Rollator   Receives Help From Family   Prior Level of Assist for ADLs Independent   Ambulation Assistance Independent   Prior Level of Assist for Transfers Independent   Active  Yes   Occupation Retired   Discharge Planning   Type of Residence House   Living Arrangements Spouse/Significant Other   Current Services Prior To Admission None   Potential Assistance Needed Home Care   Potential Assistance Purchasing Medications No   Patient expects to be discharged to: House   One/Two Story

## 2025-01-31 ENCOUNTER — TELEPHONE (OUTPATIENT)
Dept: INTERNAL MEDICINE CLINIC | Facility: CLINIC | Age: 89
End: 2025-01-31

## 2025-01-31 ENCOUNTER — APPOINTMENT (OUTPATIENT)
Dept: NON INVASIVE DIAGNOSTICS | Age: 89
DRG: 444 | End: 2025-01-31
Payer: MEDICARE

## 2025-01-31 ENCOUNTER — APPOINTMENT (OUTPATIENT)
Dept: CT IMAGING | Age: 89
DRG: 444 | End: 2025-01-31
Payer: MEDICARE

## 2025-01-31 PROBLEM — J90 BILATERAL PLEURAL EFFUSION: Status: ACTIVE | Noted: 2025-01-31

## 2025-01-31 PROBLEM — K57.32 DIVERTICULITIS OF COLON: Status: ACTIVE | Noted: 2025-01-31

## 2025-01-31 LAB
ALBUMIN SERPL-MCNC: 3.2 G/DL (ref 3.2–4.6)
ALBUMIN/GLOB SERPL: 1.1 (ref 1–1.9)
ALP SERPL-CCNC: 76 U/L (ref 35–104)
ALT SERPL-CCNC: 10 U/L (ref 8–45)
ANION GAP SERPL CALC-SCNC: 16 MMOL/L (ref 7–16)
AST SERPL-CCNC: 36 U/L (ref 15–37)
BACTERIA SPEC CULT: ABNORMAL
BACTERIA SPEC CULT: NORMAL
BASOPHILS # BLD: 0.04 K/UL (ref 0–0.2)
BASOPHILS NFR BLD: 0.3 % (ref 0–2)
BILIRUB SERPL-MCNC: 0.5 MG/DL (ref 0–1.2)
BUN SERPL-MCNC: 12 MG/DL (ref 8–23)
CALCIUM SERPL-MCNC: 11.6 MG/DL (ref 8.8–10.2)
CEA SERPL-MCNC: 7.1 NG/ML (ref 0–3.8)
CHLORIDE SERPL-SCNC: 98 MMOL/L (ref 98–107)
CO2 SERPL-SCNC: 20 MMOL/L (ref 20–29)
CREAT SERPL-MCNC: 0.94 MG/DL (ref 0.6–1.1)
DIFFERENTIAL METHOD BLD: ABNORMAL
ECHO AO ASC DIAM: 3.3 CM
ECHO AO ASCENDING AORTA INDEX: 1.96 CM/M2
ECHO AO ROOT DIAM: 3 CM
ECHO AO ROOT INDEX: 1.79 CM/M2
ECHO AV AREA PEAK VELOCITY: 1.3 CM2
ECHO AV AREA VTI: 1.3 CM2
ECHO AV AREA/BSA PEAK VELOCITY: 0.8 CM2/M2
ECHO AV AREA/BSA VTI: 0.8 CM2/M2
ECHO AV MEAN GRADIENT: 20 MMHG
ECHO AV MEAN VELOCITY: 2.1 M/S
ECHO AV PEAK GRADIENT: 33 MMHG
ECHO AV PEAK VELOCITY: 2.9 M/S
ECHO AV VELOCITY RATIO: 0.41
ECHO AV VTI: 63.9 CM
ECHO BSA: 1.72 M2
ECHO EST RA PRESSURE: 8 MMHG
ECHO LA AREA 2C: 20.9 CM2
ECHO LA AREA 4C: 23.4 CM2
ECHO LA DIAMETER INDEX: 1.96 CM/M2
ECHO LA DIAMETER: 3.3 CM
ECHO LA MAJOR AXIS: 6.4 CM
ECHO LA MINOR AXIS: 5.7 CM
ECHO LA TO AORTIC ROOT RATIO: 1.1
ECHO LA VOL BP: 67 ML (ref 22–52)
ECHO LA VOL MOD A2C: 60 ML (ref 22–52)
ECHO LA VOL MOD A4C: 67 ML (ref 22–52)
ECHO LA VOL/BSA BIPLANE: 40 ML/M2 (ref 16–34)
ECHO LA VOLUME INDEX MOD A2C: 36 ML/M2 (ref 16–34)
ECHO LA VOLUME INDEX MOD A4C: 40 ML/M2 (ref 16–34)
ECHO LV E' LATERAL VELOCITY: 12.7 CM/S
ECHO LV E' SEPTAL VELOCITY: 6.1 CM/S
ECHO LV EDV A2C: 112 ML
ECHO LV EDV A4C: 109 ML
ECHO LV EDV INDEX A4C: 65 ML/M2
ECHO LV EDV NDEX A2C: 67 ML/M2
ECHO LV EJECTION FRACTION A2C: 67 %
ECHO LV EJECTION FRACTION A4C: 63 %
ECHO LV EJECTION FRACTION BIPLANE: 65 % (ref 55–100)
ECHO LV ESV A2C: 37 ML
ECHO LV ESV A4C: 41 ML
ECHO LV ESV INDEX A2C: 22 ML/M2
ECHO LV ESV INDEX A4C: 24 ML/M2
ECHO LV FRACTIONAL SHORTENING: 33 % (ref 28–44)
ECHO LV INTERNAL DIMENSION DIASTOLE INDEX: 2.38 CM/M2
ECHO LV INTERNAL DIMENSION DIASTOLIC: 4 CM (ref 3.9–5.3)
ECHO LV INTERNAL DIMENSION SYSTOLIC INDEX: 1.61 CM/M2
ECHO LV INTERNAL DIMENSION SYSTOLIC: 2.7 CM
ECHO LV IVSD: 1 CM (ref 0.6–0.9)
ECHO LV MASS 2D: 127.1 G (ref 67–162)
ECHO LV MASS INDEX 2D: 75.6 G/M2 (ref 43–95)
ECHO LV POSTERIOR WALL DIASTOLIC: 1 CM (ref 0.6–0.9)
ECHO LV RELATIVE WALL THICKNESS RATIO: 0.5
ECHO LVOT AREA: 3.1 CM2
ECHO LVOT AV VTI INDEX: 0.42
ECHO LVOT DIAM: 2 CM
ECHO LVOT MEAN GRADIENT: 3 MMHG
ECHO LVOT PEAK GRADIENT: 6 MMHG
ECHO LVOT PEAK VELOCITY: 1.2 M/S
ECHO LVOT STROKE VOLUME INDEX: 50.7 ML/M2
ECHO LVOT SV: 85.1 ML
ECHO LVOT VTI: 27.1 CM
ECHO MV A VELOCITY: 1.07 M/S
ECHO MV AREA VTI: 1.6 CM2
ECHO MV E DECELERATION TIME (DT): 369 MS
ECHO MV E VELOCITY: 1.03 M/S
ECHO MV E/A RATIO: 0.96
ECHO MV E/E' LATERAL: 8.11
ECHO MV E/E' RATIO (AVERAGED): 12.5
ECHO MV E/E' SEPTAL: 16.89
ECHO MV LVOT VTI INDEX: 1.93
ECHO MV MAX VELOCITY: 1.8 M/S
ECHO MV MEAN GRADIENT: 5 MMHG
ECHO MV MEAN VELOCITY: 1.1 M/S
ECHO MV PEAK GRADIENT: 13 MMHG
ECHO MV REGURGITANT PEAK GRADIENT: 36 MMHG
ECHO MV REGURGITANT PEAK VELOCITY: 3 M/S
ECHO MV VTI: 52.4 CM
ECHO PV ACCELERATION TIME (AT): 119 MS
ECHO PV MAX VELOCITY: 1.2 M/S
ECHO PV PEAK GRADIENT: 6 MMHG
ECHO RIGHT VENTRICULAR SYSTOLIC PRESSURE (RVSP): 52 MMHG
ECHO RV BASAL DIMENSION: 3.6 CM
ECHO RV FREE WALL PEAK S': 14.1 CM/S
ECHO RV MID DIMENSION: 3.5 CM
ECHO RV TAPSE: 2.7 CM (ref 1.7–?)
ECHO TV REGURGITANT MAX VELOCITY: 3.31 M/S
ECHO TV REGURGITANT PEAK GRADIENT: 44 MMHG
EOSINOPHIL # BLD: 0.03 K/UL (ref 0–0.8)
EOSINOPHIL NFR BLD: 0.2 % (ref 0.5–7.8)
ERYTHROCYTE [DISTWIDTH] IN BLOOD BY AUTOMATED COUNT: 17.2 % (ref 11.9–14.6)
GLOBULIN SER CALC-MCNC: 3 G/DL (ref 2.3–3.5)
GLUCOSE SERPL-MCNC: 117 MG/DL (ref 70–99)
HCT VFR BLD AUTO: 35.6 % (ref 35.8–46.3)
HGB BLD-MCNC: 11.7 G/DL (ref 11.7–15.4)
IMM GRANULOCYTES # BLD AUTO: 0.1 K/UL (ref 0–0.5)
IMM GRANULOCYTES NFR BLD AUTO: 0.8 % (ref 0–5)
LYMPHOCYTES # BLD: 3.19 K/UL (ref 0.5–4.6)
LYMPHOCYTES NFR BLD: 25 % (ref 13–44)
MAGNESIUM SERPL-MCNC: 1.7 MG/DL (ref 1.8–2.4)
MCH RBC QN AUTO: 33.1 PG (ref 26.1–32.9)
MCHC RBC AUTO-ENTMCNC: 32.9 G/DL (ref 31.4–35)
MCV RBC AUTO: 100.8 FL (ref 82–102)
MONOCYTES # BLD: 1.08 K/UL (ref 0.1–1.3)
MONOCYTES NFR BLD: 8.5 % (ref 4–12)
NEUTS SEG # BLD: 8.3 K/UL (ref 1.7–8.2)
NEUTS SEG NFR BLD: 65.2 % (ref 43–78)
NRBC # BLD: 0.03 K/UL (ref 0–0.2)
NT PRO BNP: 2950 PG/ML (ref 0–450)
PLATELET # BLD AUTO: 123 K/UL (ref 150–450)
PMV BLD AUTO: 13 FL (ref 9.4–12.3)
POTASSIUM SERPL-SCNC: 3.8 MMOL/L (ref 3.5–5.1)
PROT SERPL-MCNC: 6.2 G/DL (ref 6.3–8.2)
RBC # BLD AUTO: 3.53 M/UL (ref 4.05–5.2)
SERVICE CMNT-IMP: ABNORMAL
SERVICE CMNT-IMP: NORMAL
SODIUM SERPL-SCNC: 134 MMOL/L (ref 136–145)
WBC # BLD AUTO: 12.7 K/UL (ref 4.3–11.1)

## 2025-01-31 PROCEDURE — 6360000002 HC RX W HCPCS: Performed by: PHYSICIAN ASSISTANT

## 2025-01-31 PROCEDURE — 80053 COMPREHEN METABOLIC PANEL: CPT

## 2025-01-31 PROCEDURE — 2580000003 HC RX 258: Performed by: STUDENT IN AN ORGANIZED HEALTH CARE EDUCATION/TRAINING PROGRAM

## 2025-01-31 PROCEDURE — 83735 ASSAY OF MAGNESIUM: CPT

## 2025-01-31 PROCEDURE — 6360000002 HC RX W HCPCS: Performed by: STUDENT IN AN ORGANIZED HEALTH CARE EDUCATION/TRAINING PROGRAM

## 2025-01-31 PROCEDURE — 94640 AIRWAY INHALATION TREATMENT: CPT

## 2025-01-31 PROCEDURE — 94761 N-INVAS EAR/PLS OXIMETRY MLT: CPT

## 2025-01-31 PROCEDURE — 2700000000 HC OXYGEN THERAPY PER DAY

## 2025-01-31 PROCEDURE — 86301 IMMUNOASSAY TUMOR CA 19-9: CPT

## 2025-01-31 PROCEDURE — 2500000003 HC RX 250 WO HCPCS: Performed by: STUDENT IN AN ORGANIZED HEALTH CARE EDUCATION/TRAINING PROGRAM

## 2025-01-31 PROCEDURE — 94760 N-INVAS EAR/PLS OXIMETRY 1: CPT

## 2025-01-31 PROCEDURE — 93306 TTE W/DOPPLER COMPLETE: CPT | Performed by: INTERNAL MEDICINE

## 2025-01-31 PROCEDURE — 2500000003 HC RX 250 WO HCPCS: Performed by: PHYSICIAN ASSISTANT

## 2025-01-31 PROCEDURE — 36415 COLL VENOUS BLD VENIPUNCTURE: CPT

## 2025-01-31 PROCEDURE — 71250 CT THORAX DX C-: CPT

## 2025-01-31 PROCEDURE — 82378 CARCINOEMBRYONIC ANTIGEN: CPT

## 2025-01-31 PROCEDURE — 83880 ASSAY OF NATRIURETIC PEPTIDE: CPT

## 2025-01-31 PROCEDURE — 1100000000 HC RM PRIVATE

## 2025-01-31 PROCEDURE — 82105 ALPHA-FETOPROTEIN SERUM: CPT

## 2025-01-31 PROCEDURE — 6370000000 HC RX 637 (ALT 250 FOR IP): Performed by: PHYSICIAN ASSISTANT

## 2025-01-31 PROCEDURE — 93306 TTE W/DOPPLER COMPLETE: CPT

## 2025-01-31 PROCEDURE — 99232 SBSQ HOSP IP/OBS MODERATE 35: CPT | Performed by: INTERNAL MEDICINE

## 2025-01-31 PROCEDURE — 85025 COMPLETE CBC W/AUTO DIFF WBC: CPT

## 2025-01-31 PROCEDURE — 6370000000 HC RX 637 (ALT 250 FOR IP): Performed by: STUDENT IN AN ORGANIZED HEALTH CARE EDUCATION/TRAINING PROGRAM

## 2025-01-31 RX ORDER — LOPERAMIDE HYDROCHLORIDE 2 MG/1
2 CAPSULE ORAL 3 TIMES DAILY PRN
Status: DISCONTINUED | OUTPATIENT
Start: 2025-01-31 | End: 2025-02-09 | Stop reason: HOSPADM

## 2025-01-31 RX ORDER — MENTHOL/CAMPHOR/ALLANTOIN/PHE 0.6-0.5-1%
OINTMENT(EA) TOPICAL 2 TIMES DAILY PRN
Status: DISCONTINUED | OUTPATIENT
Start: 2025-01-31 | End: 2025-02-09 | Stop reason: HOSPADM

## 2025-01-31 RX ORDER — FAMOTIDINE 20 MG/1
20 TABLET, FILM COATED ORAL 2 TIMES DAILY
Status: DISCONTINUED | OUTPATIENT
Start: 2025-01-31 | End: 2025-01-31

## 2025-01-31 RX ORDER — ALBUTEROL SULFATE 0.83 MG/ML
2.5 SOLUTION RESPIRATORY (INHALATION)
Status: DISPENSED | OUTPATIENT
Start: 2025-01-31 | End: 2025-02-01

## 2025-01-31 RX ORDER — PANTOPRAZOLE SODIUM 40 MG/1
40 TABLET, DELAYED RELEASE ORAL
Status: DISCONTINUED | OUTPATIENT
Start: 2025-02-01 | End: 2025-02-05

## 2025-01-31 RX ORDER — MAGNESIUM SULFATE IN WATER 40 MG/ML
2000 INJECTION, SOLUTION INTRAVENOUS ONCE
Status: COMPLETED | OUTPATIENT
Start: 2025-01-31 | End: 2025-01-31

## 2025-01-31 RX ORDER — LOPERAMIDE HYDROCHLORIDE 2 MG/1
4 CAPSULE ORAL ONCE
Status: DISCONTINUED | OUTPATIENT
Start: 2025-01-31 | End: 2025-01-31

## 2025-01-31 RX ORDER — FAMOTIDINE 20 MG/1
20 TABLET, FILM COATED ORAL DAILY
Status: DISCONTINUED | OUTPATIENT
Start: 2025-01-31 | End: 2025-02-09 | Stop reason: HOSPADM

## 2025-01-31 RX ADMIN — METRONIDAZOLE 500 MG: 500 INJECTION, SOLUTION INTRAVENOUS at 00:01

## 2025-01-31 RX ADMIN — MAGNESIUM SULFATE HEPTAHYDRATE 2000 MG: 40 INJECTION, SOLUTION INTRAVENOUS at 12:55

## 2025-01-31 RX ADMIN — WATER 1000 MG: 1 INJECTION INTRAMUSCULAR; INTRAVENOUS; SUBCUTANEOUS at 12:35

## 2025-01-31 RX ADMIN — WATER 2000 MG: 1 INJECTION INTRAMUSCULAR; INTRAVENOUS; SUBCUTANEOUS at 15:18

## 2025-01-31 RX ADMIN — ALBUTEROL SULFATE 2.5 MG: 2.5 SOLUTION RESPIRATORY (INHALATION) at 19:31

## 2025-01-31 RX ADMIN — ALLOPURINOL 100 MG: 100 TABLET ORAL at 08:28

## 2025-01-31 RX ADMIN — ALBUTEROL SULFATE 2.5 MG: 2.5 SOLUTION RESPIRATORY (INHALATION) at 12:05

## 2025-01-31 RX ADMIN — LOPERAMIDE HYDROCHLORIDE 2 MG: 2 CAPSULE ORAL at 12:33

## 2025-01-31 RX ADMIN — METRONIDAZOLE 500 MG: 500 INJECTION, SOLUTION INTRAVENOUS at 12:55

## 2025-01-31 RX ADMIN — SODIUM CHLORIDE 40 MG: 9 INJECTION INTRAMUSCULAR; INTRAVENOUS; SUBCUTANEOUS at 02:38

## 2025-01-31 RX ADMIN — FAMOTIDINE 20 MG: 20 TABLET, FILM COATED ORAL at 17:47

## 2025-01-31 RX ADMIN — SODIUM CHLORIDE, PRESERVATIVE FREE 10 ML: 5 INJECTION INTRAVENOUS at 08:29

## 2025-01-31 RX ADMIN — LOSARTAN POTASSIUM 25 MG: 25 TABLET, FILM COATED ORAL at 08:29

## 2025-01-31 RX ADMIN — SODIUM CHLORIDE 40 MG: 9 INJECTION INTRAMUSCULAR; INTRAVENOUS; SUBCUTANEOUS at 15:18

## 2025-01-31 RX ADMIN — FUROSEMIDE 20 MG: 20 TABLET ORAL at 08:28

## 2025-01-31 ASSESSMENT — PAIN SCALES - GENERAL: PAINLEVEL_OUTOF10: 0

## 2025-01-31 NOTE — ANESTHESIA POSTPROCEDURE EVALUATION
Department of Anesthesiology  Postprocedure Note    Patient: Jacklyn Castillo  MRN: [id number]  YOB: 1934  Date of evaluation: 1/30/2025    Procedure Summary       Date: 01/30/25 Room / Location: [facility] ENDO FLOURO 1 / [facility] ENDOSCOPY    Anesthesia Start: 1732 Anesthesia Stop: 1810    Procedure: ENDOSCOPIC ULTRASOUND, axios stent placement Diagnosis:       Cholecystitis      (Cholecystitis [K81.9])    Surgeons: Diane Corea MD Responsible Provider: Kyra Jewell MD    Anesthesia Type: TIVA ASA Status: Not recorded            Anesthesia Type: TIVA    Ying Phase I: Ying Score: 10    Ying Phase II:      Anesthesia Post Evaluation    Patient location during evaluation: PACU  Patient participation: complete - patient participated  Level of consciousness: awake and alert  Airway patency: patent  Nausea & Vomiting: no nausea  Cardiovascular status: hemodynamically stable  Respiratory status: acceptable  Hydration status: euvolemic  Pain management: adequate and satisfactory to patient        No notable events documented.

## 2025-01-31 NOTE — PLAN OF CARE
Problem: Discharge Planning  Goal: Discharge to home or other facility with appropriate resources  1/31/2025 0958 by Rupal Pickering RN  Outcome: Progressing  1/31/2025 0108 by Agustina Bess RN  Outcome: Progressing     Problem: Pain  Goal: Verbalizes/displays adequate comfort level or baseline comfort level  1/31/2025 0958 by Rupal Pickering RN  Outcome: Progressing  1/31/2025 0108 by Agustina Bess RN  Outcome: Progressing     Problem: Safety - Adult  Goal: Free from fall injury  1/31/2025 0958 by Rupal Pickering RN  Outcome: Progressing  1/31/2025 0108 by Agustina Bess RN  Outcome: Progressing  Flowsheets (Taken 1/30/2025 1622 by Gema Carreon, RN)  Free From Fall Injury:   Instruct family/caregiver on patient safety   Based on caregiver fall risk screen, instruct family/caregiver to ask for assistance with transferring infant if caregiver noted to have fall risk factors     Problem: Skin/Tissue Integrity  Goal: Skin integrity remains intact  Description: 1.  Monitor for areas of redness and/or skin breakdown  2.  Assess vascular access sites hourly  3.  Every 4-6 hours minimum:  Change oxygen saturation probe site  4.  Every 4-6 hours:  If on nasal continuous positive airway pressure, respiratory therapy assess nares and determine need for appliance change or resting period  1/31/2025 0958 by Rupal Pickering RN  Outcome: Progressing  1/31/2025 0108 by Agustina Bess RN  Outcome: Progressing  Flowsheets (Taken 1/30/2025 1622 by Gema Carreon, RN)  Skin Integrity Remains Intact:   Monitor for areas of redness and/or skin breakdown   Assess vascular access sites hourly   Every 4-6 hours minimum: Change oxygen saturation probe site   Every 4-6 hours: If on nasal continuous positive airway pressure, respiratory therapy assesses nares and determine need for appliance change or resting period     Problem: ABCDS Injury Assessment  Goal: Absence of physical injury  1/31/2025 0958 by Rupal Pickering

## 2025-01-31 NOTE — TELEPHONE ENCOUNTER
Name of the Nurse Calling and Facility:   Nora with [facility]     Best Contact Number to Reach the Nurse: [phone]    Reason For Call:  Pt has been admitted to the hospital. Nora wants to be sure that you will sign off on home health orders when she is released.

## 2025-01-31 NOTE — OP NOTE
parenchyma. The pancreatic body and tail were normal. The pancreatic duct was identified.PD was normal throughout.     The scope was then introduced further toward the gastric antrum. The gallbladder was identified. This was distended with sludge. No CBD stones noted. There is pericholecystic fluid. The distance from the gallbladder wall to the duodenum was 2mm. The CBD measured 9 mm. No obvious CBD stones noted. The portal vein was idenitified. The portal confluence was normal.     A 15mm x 10mm LAMS (ThemBid) successfully placed in the gallbladder. Significant bilious drainage was noted     The scope was advanced to the deep view, and the uncinate process was identified. This was normal.      The scope was pulled back, and the procedure was subsequently ended.     Impression:  --Cholecystitis s/p drainage of the gallbladder     Recommendations:  --Advance tube feeds   --Monitor clinical status, can consider replacement of the gallbladder stent    --follow up in 3 months

## 2025-02-01 LAB
ALBUMIN SERPL-MCNC: 3.1 G/DL (ref 3.2–4.6)
ALBUMIN/GLOB SERPL: 1.1 (ref 1–1.9)
ALP SERPL-CCNC: 73 U/L (ref 35–104)
ALT SERPL-CCNC: 10 U/L (ref 8–45)
ANION GAP SERPL CALC-SCNC: 14 MMOL/L (ref 7–16)
AST SERPL-CCNC: 32 U/L (ref 15–37)
BASOPHILS # BLD: 0.05 K/UL (ref 0–0.2)
BASOPHILS NFR BLD: 0.4 % (ref 0–2)
BILIRUB SERPL-MCNC: 0.4 MG/DL (ref 0–1.2)
BUN SERPL-MCNC: 14 MG/DL (ref 8–23)
CALCIUM SERPL-MCNC: 11.4 MG/DL (ref 8.8–10.2)
CHLORIDE SERPL-SCNC: 96 MMOL/L (ref 98–107)
CO2 SERPL-SCNC: 23 MMOL/L (ref 20–29)
CREAT SERPL-MCNC: 0.98 MG/DL (ref 0.6–1.1)
DIFFERENTIAL METHOD BLD: ABNORMAL
EOSINOPHIL # BLD: 0.03 K/UL (ref 0–0.8)
EOSINOPHIL NFR BLD: 0.2 % (ref 0.5–7.8)
ERYTHROCYTE [DISTWIDTH] IN BLOOD BY AUTOMATED COUNT: 16.7 % (ref 11.9–14.6)
GLOBULIN SER CALC-MCNC: 2.8 G/DL (ref 2.3–3.5)
GLUCOSE SERPL-MCNC: 105 MG/DL (ref 70–99)
HCT VFR BLD AUTO: 34.2 % (ref 35.8–46.3)
HGB BLD-MCNC: 11.6 G/DL (ref 11.7–15.4)
IMM GRANULOCYTES # BLD AUTO: 0.07 K/UL (ref 0–0.5)
IMM GRANULOCYTES NFR BLD AUTO: 0.6 % (ref 0–5)
LYMPHOCYTES # BLD: 3.43 K/UL (ref 0.5–4.6)
LYMPHOCYTES NFR BLD: 28.5 % (ref 13–44)
MAGNESIUM SERPL-MCNC: 1.8 MG/DL (ref 1.8–2.4)
MCH RBC QN AUTO: 33 PG (ref 26.1–32.9)
MCHC RBC AUTO-ENTMCNC: 33.9 G/DL (ref 31.4–35)
MCV RBC AUTO: 97.4 FL (ref 82–102)
MONOCYTES # BLD: 1.27 K/UL (ref 0.1–1.3)
MONOCYTES NFR BLD: 10.6 % (ref 4–12)
NEUTS SEG # BLD: 7.18 K/UL (ref 1.7–8.2)
NEUTS SEG NFR BLD: 59.7 % (ref 43–78)
NRBC # BLD: 0.02 K/UL (ref 0–0.2)
PLATELET # BLD AUTO: 108 K/UL (ref 150–450)
PMV BLD AUTO: 13.3 FL (ref 9.4–12.3)
POTASSIUM SERPL-SCNC: 3.3 MMOL/L (ref 3.5–5.1)
PROT SERPL-MCNC: 5.9 G/DL (ref 6.3–8.2)
RBC # BLD AUTO: 3.51 M/UL (ref 4.05–5.2)
SODIUM SERPL-SCNC: 134 MMOL/L (ref 136–145)
WBC # BLD AUTO: 12 K/UL (ref 4.3–11.1)

## 2025-02-01 PROCEDURE — 2500000003 HC RX 250 WO HCPCS: Performed by: PHYSICIAN ASSISTANT

## 2025-02-01 PROCEDURE — 2700000000 HC OXYGEN THERAPY PER DAY

## 2025-02-01 PROCEDURE — 6360000002 HC RX W HCPCS: Performed by: PHYSICIAN ASSISTANT

## 2025-02-01 PROCEDURE — 83735 ASSAY OF MAGNESIUM: CPT

## 2025-02-01 PROCEDURE — 1100000000 HC RM PRIVATE

## 2025-02-01 PROCEDURE — 94760 N-INVAS EAR/PLS OXIMETRY 1: CPT

## 2025-02-01 PROCEDURE — 6370000000 HC RX 637 (ALT 250 FOR IP)

## 2025-02-01 PROCEDURE — 36415 COLL VENOUS BLD VENIPUNCTURE: CPT

## 2025-02-01 PROCEDURE — 2500000003 HC RX 250 WO HCPCS: Performed by: STUDENT IN AN ORGANIZED HEALTH CARE EDUCATION/TRAINING PROGRAM

## 2025-02-01 PROCEDURE — 94640 AIRWAY INHALATION TREATMENT: CPT

## 2025-02-01 PROCEDURE — 6370000000 HC RX 637 (ALT 250 FOR IP): Performed by: STUDENT IN AN ORGANIZED HEALTH CARE EDUCATION/TRAINING PROGRAM

## 2025-02-01 PROCEDURE — 80053 COMPREHEN METABOLIC PANEL: CPT

## 2025-02-01 PROCEDURE — 85025 COMPLETE CBC W/AUTO DIFF WBC: CPT

## 2025-02-01 PROCEDURE — 6360000002 HC RX W HCPCS: Performed by: STUDENT IN AN ORGANIZED HEALTH CARE EDUCATION/TRAINING PROGRAM

## 2025-02-01 PROCEDURE — 6370000000 HC RX 637 (ALT 250 FOR IP): Performed by: PHYSICIAN ASSISTANT

## 2025-02-01 RX ORDER — POTASSIUM CHLORIDE 1500 MG/1
40 TABLET, EXTENDED RELEASE ORAL ONCE
Status: COMPLETED | OUTPATIENT
Start: 2025-02-01 | End: 2025-02-01

## 2025-02-01 RX ORDER — MAGNESIUM HYDROXIDE/ALUMINUM HYDROXICE/SIMETHICONE 120; 1200; 1200 MG/30ML; MG/30ML; MG/30ML
30 SUSPENSION ORAL EVERY 6 HOURS PRN
Status: DISCONTINUED | OUTPATIENT
Start: 2025-02-01 | End: 2025-02-09 | Stop reason: HOSPADM

## 2025-02-01 RX ADMIN — FAMOTIDINE 20 MG: 20 TABLET, FILM COATED ORAL at 08:17

## 2025-02-01 RX ADMIN — FUROSEMIDE 20 MG: 20 TABLET ORAL at 08:17

## 2025-02-01 RX ADMIN — WATER 2000 MG: 1 INJECTION INTRAMUSCULAR; INTRAVENOUS; SUBCUTANEOUS at 13:37

## 2025-02-01 RX ADMIN — SODIUM CHLORIDE, PRESERVATIVE FREE 10 ML: 5 INJECTION INTRAVENOUS at 00:03

## 2025-02-01 RX ADMIN — LOPERAMIDE HYDROCHLORIDE 2 MG: 2 CAPSULE ORAL at 21:37

## 2025-02-01 RX ADMIN — ONDANSETRON 4 MG: 2 INJECTION, SOLUTION INTRAMUSCULAR; INTRAVENOUS at 21:26

## 2025-02-01 RX ADMIN — ALUMINUM HYDROXIDE, MAGNESIUM HYDROXIDE, AND SIMETHICONE 30 ML: 1200; 120; 1200 SUSPENSION ORAL at 18:21

## 2025-02-01 RX ADMIN — LOSARTAN POTASSIUM 25 MG: 25 TABLET, FILM COATED ORAL at 08:17

## 2025-02-01 RX ADMIN — POTASSIUM CHLORIDE 40 MEQ: 1500 TABLET, EXTENDED RELEASE ORAL at 09:37

## 2025-02-01 RX ADMIN — ALBUTEROL SULFATE 2.5 MG: 2.5 SOLUTION RESPIRATORY (INHALATION) at 07:53

## 2025-02-01 RX ADMIN — METRONIDAZOLE 500 MG: 500 INJECTION, SOLUTION INTRAVENOUS at 12:33

## 2025-02-01 RX ADMIN — ALLOPURINOL 100 MG: 100 TABLET ORAL at 08:17

## 2025-02-01 RX ADMIN — SODIUM CHLORIDE, PRESERVATIVE FREE 10 ML: 5 INJECTION INTRAVENOUS at 21:19

## 2025-02-01 RX ADMIN — METRONIDAZOLE 500 MG: 500 INJECTION, SOLUTION INTRAVENOUS at 00:06

## 2025-02-01 RX ADMIN — PANTOPRAZOLE SODIUM 40 MG: 40 TABLET, DELAYED RELEASE ORAL at 06:03

## 2025-02-01 RX ADMIN — SODIUM CHLORIDE, PRESERVATIVE FREE 10 ML: 5 INJECTION INTRAVENOUS at 08:17

## 2025-02-01 ASSESSMENT — PAIN SCALES - GENERAL: PAINLEVEL_OUTOF10: 0

## 2025-02-01 NOTE — PLAN OF CARE
Problem: Discharge Planning  Goal: Discharge to home or other facility with appropriate resources  2/1/2025 1059 by Marni Mendieta RN  Outcome: Progressing  2/1/2025 0108 by Sarah Faulkner RN  Outcome: Progressing     Problem: Pain  Goal: Verbalizes/displays adequate comfort level or baseline comfort level  2/1/2025 1059 by Marni Mendieta RN  Outcome: Progressing  2/1/2025 0108 by Sarah Faulkner RN  Outcome: Progressing     Problem: Safety - Adult  Goal: Free from fall injury  2/1/2025 1059 by Marni Mendieta RN  Outcome: Progressing  Flowsheets (Taken 2/1/2025 0742)  Free From Fall Injury: Instruct family/caregiver on patient safety  2/1/2025 0108 by Sarah Faulkner RN  Outcome: Progressing     Problem: Skin/Tissue Integrity  Goal: Skin integrity remains intact  Description: 1.  Monitor for areas of redness and/or skin breakdown  2.  Assess vascular access sites hourly  3.  Every 4-6 hours minimum:  Change oxygen saturation probe site  4.  Every 4-6 hours:  If on nasal continuous positive airway pressure, respiratory therapy assess nares and determine need for appliance change or resting period  2/1/2025 1059 by Marni Mendieta RN  Outcome: Progressing  Flowsheets (Taken 2/1/2025 0742)  Skin Integrity Remains Intact: Monitor for areas of redness and/or skin breakdown  2/1/2025 0108 by Sarah Faulkner RN  Outcome: Progressing     Problem: ABCDS Injury Assessment  Goal: Absence of physical injury  2/1/2025 1059 by Marni Mendieta RN  Outcome: Progressing  2/1/2025 0108 by Sarah Faulkner RN  Outcome: Progressing     Problem: Respiratory - Adult  Goal: Achieves optimal ventilation and oxygenation  2/1/2025 1059 by Marni Mendieta RN  Outcome: Progressing  2/1/2025 0755 by Myriam Cardona RCP  Outcome: Progressing      normal...

## 2025-02-02 LAB
ALBUMIN SERPL-MCNC: 2.9 G/DL (ref 3.2–4.6)
ALBUMIN/GLOB SERPL: 1.1 (ref 1–1.9)
ALP SERPL-CCNC: 67 U/L (ref 35–104)
ALT SERPL-CCNC: 8 U/L (ref 8–45)
ANION GAP SERPL CALC-SCNC: 11 MMOL/L (ref 7–16)
AST SERPL-CCNC: 30 U/L (ref 15–37)
BACTERIA SPEC CULT: NORMAL
BACTERIA SPEC CULT: NORMAL
BASOPHILS # BLD: 0.05 K/UL (ref 0–0.2)
BASOPHILS NFR BLD: 0.3 % (ref 0–2)
BILIRUB SERPL-MCNC: 0.4 MG/DL (ref 0–1.2)
BUN SERPL-MCNC: 16 MG/DL (ref 8–23)
CALCIUM SERPL-MCNC: 10.8 MG/DL (ref 8.8–10.2)
CHLORIDE SERPL-SCNC: 96 MMOL/L (ref 98–107)
CO2 SERPL-SCNC: 22 MMOL/L (ref 20–29)
CREAT SERPL-MCNC: 0.91 MG/DL (ref 0.6–1.1)
DIFFERENTIAL METHOD BLD: ABNORMAL
EOSINOPHIL # BLD: 0.11 K/UL (ref 0–0.8)
EOSINOPHIL NFR BLD: 0.7 % (ref 0.5–7.8)
ERYTHROCYTE [DISTWIDTH] IN BLOOD BY AUTOMATED COUNT: 16.3 % (ref 11.9–14.6)
GLOBULIN SER CALC-MCNC: 2.8 G/DL (ref 2.3–3.5)
GLUCOSE SERPL-MCNC: 101 MG/DL (ref 70–99)
HCT VFR BLD AUTO: 33.3 % (ref 35.8–46.3)
HGB BLD-MCNC: 11.1 G/DL (ref 11.7–15.4)
IMM GRANULOCYTES # BLD AUTO: 0.11 K/UL (ref 0–0.5)
IMM GRANULOCYTES NFR BLD AUTO: 0.7 % (ref 0–5)
LYMPHOCYTES # BLD: 5.11 K/UL (ref 0.5–4.6)
LYMPHOCYTES NFR BLD: 33.4 % (ref 13–44)
MAGNESIUM SERPL-MCNC: 1.6 MG/DL (ref 1.8–2.4)
MCH RBC QN AUTO: 32.7 PG (ref 26.1–32.9)
MCHC RBC AUTO-ENTMCNC: 33.3 G/DL (ref 31.4–35)
MCV RBC AUTO: 98.2 FL (ref 82–102)
MONOCYTES # BLD: 1.64 K/UL (ref 0.1–1.3)
MONOCYTES NFR BLD: 10.7 % (ref 4–12)
NEUTS SEG # BLD: 8.28 K/UL (ref 1.7–8.2)
NEUTS SEG NFR BLD: 54.2 % (ref 43–78)
NRBC # BLD: 0.05 K/UL (ref 0–0.2)
PLATELET # BLD AUTO: 108 K/UL (ref 150–450)
PLATELET COMMENT: SLIGHT
PMV BLD AUTO: 12.7 FL (ref 9.4–12.3)
POTASSIUM SERPL-SCNC: 3.7 MMOL/L (ref 3.5–5.1)
PROT SERPL-MCNC: 5.7 G/DL (ref 6.3–8.2)
RBC # BLD AUTO: 3.39 M/UL (ref 4.05–5.2)
RBC MORPH BLD: ABNORMAL
RBC MORPH BLD: ABNORMAL
SERVICE CMNT-IMP: NORMAL
SERVICE CMNT-IMP: NORMAL
SODIUM SERPL-SCNC: 129 MMOL/L (ref 136–145)
WBC # BLD AUTO: 15.3 K/UL (ref 4.3–11.1)
WBC MORPH BLD: ABNORMAL

## 2025-02-02 PROCEDURE — 36415 COLL VENOUS BLD VENIPUNCTURE: CPT

## 2025-02-02 PROCEDURE — 6370000000 HC RX 637 (ALT 250 FOR IP): Performed by: PHYSICIAN ASSISTANT

## 2025-02-02 PROCEDURE — 2500000003 HC RX 250 WO HCPCS: Performed by: STUDENT IN AN ORGANIZED HEALTH CARE EDUCATION/TRAINING PROGRAM

## 2025-02-02 PROCEDURE — 83735 ASSAY OF MAGNESIUM: CPT

## 2025-02-02 PROCEDURE — 6370000000 HC RX 637 (ALT 250 FOR IP): Performed by: INTERNAL MEDICINE

## 2025-02-02 PROCEDURE — 2580000003 HC RX 258: Performed by: FAMILY MEDICINE

## 2025-02-02 PROCEDURE — 6360000002 HC RX W HCPCS: Performed by: FAMILY MEDICINE

## 2025-02-02 PROCEDURE — 6360000002 HC RX W HCPCS: Performed by: INTERNAL MEDICINE

## 2025-02-02 PROCEDURE — 1100000000 HC RM PRIVATE

## 2025-02-02 PROCEDURE — 85025 COMPLETE CBC W/AUTO DIFF WBC: CPT

## 2025-02-02 PROCEDURE — 80053 COMPREHEN METABOLIC PANEL: CPT

## 2025-02-02 PROCEDURE — 6370000000 HC RX 637 (ALT 250 FOR IP): Performed by: STUDENT IN AN ORGANIZED HEALTH CARE EDUCATION/TRAINING PROGRAM

## 2025-02-02 PROCEDURE — 6360000002 HC RX W HCPCS: Performed by: PHYSICIAN ASSISTANT

## 2025-02-02 RX ORDER — MAGNESIUM SULFATE IN WATER 40 MG/ML
2000 INJECTION, SOLUTION INTRAVENOUS ONCE
Status: COMPLETED | OUTPATIENT
Start: 2025-02-02 | End: 2025-02-02

## 2025-02-02 RX ORDER — FUROSEMIDE 20 MG/1
20 TABLET ORAL DAILY
Status: CANCELLED | OUTPATIENT
Start: 2025-02-02

## 2025-02-02 RX ORDER — FUROSEMIDE 10 MG/ML
20 INJECTION INTRAMUSCULAR; INTRAVENOUS ONCE
Status: COMPLETED | OUTPATIENT
Start: 2025-02-03 | End: 2025-02-03

## 2025-02-02 RX ORDER — LANOLIN ALCOHOL/MO/W.PET/CERES
400 CREAM (GRAM) TOPICAL DAILY
Status: DISCONTINUED | OUTPATIENT
Start: 2025-02-02 | End: 2025-02-09 | Stop reason: HOSPADM

## 2025-02-02 RX ADMIN — METRONIDAZOLE 500 MG: 500 INJECTION, SOLUTION INTRAVENOUS at 12:09

## 2025-02-02 RX ADMIN — SODIUM CHLORIDE, PRESERVATIVE FREE 10 ML: 5 INJECTION INTRAVENOUS at 22:44

## 2025-02-02 RX ADMIN — SODIUM CHLORIDE, PRESERVATIVE FREE 10 ML: 5 INJECTION INTRAVENOUS at 10:12

## 2025-02-02 RX ADMIN — METRONIDAZOLE 500 MG: 500 INJECTION, SOLUTION INTRAVENOUS at 23:46

## 2025-02-02 RX ADMIN — METRONIDAZOLE 500 MG: 500 INJECTION, SOLUTION INTRAVENOUS at 00:47

## 2025-02-02 RX ADMIN — CEFEPIME 2000 MG: 2 INJECTION, POWDER, FOR SOLUTION INTRAVENOUS at 02:00

## 2025-02-02 RX ADMIN — PANTOPRAZOLE SODIUM 40 MG: 40 TABLET, DELAYED RELEASE ORAL at 06:19

## 2025-02-02 RX ADMIN — MAGNESIUM SULFATE HEPTAHYDRATE 2000 MG: 40 INJECTION, SOLUTION INTRAVENOUS at 10:04

## 2025-02-02 RX ADMIN — LOSARTAN POTASSIUM 25 MG: 25 TABLET, FILM COATED ORAL at 09:57

## 2025-02-02 RX ADMIN — CEFEPIME 2000 MG: 2 INJECTION, POWDER, FOR SOLUTION INTRAVENOUS at 13:16

## 2025-02-02 RX ADMIN — MAGNESIUM GLUCONATE 500 MG ORAL TABLET 400 MG: 500 TABLET ORAL at 09:57

## 2025-02-02 RX ADMIN — FAMOTIDINE 20 MG: 20 TABLET, FILM COATED ORAL at 09:57

## 2025-02-02 RX ADMIN — ALLOPURINOL 100 MG: 100 TABLET ORAL at 09:57

## 2025-02-03 ENCOUNTER — APPOINTMENT (OUTPATIENT)
Dept: GENERAL RADIOLOGY | Age: 89
DRG: 444 | End: 2025-02-03
Payer: MEDICARE

## 2025-02-03 PROBLEM — I50.31 ACUTE HEART FAILURE WITH PRESERVED EJECTION FRACTION (HCC): Status: ACTIVE | Noted: 2025-01-28

## 2025-02-03 PROBLEM — E87.1 HYPONATREMIA: Status: ACTIVE | Noted: 2025-02-03

## 2025-02-03 LAB
AFP-TM SERPL-MCNC: 1.99 NG/ML (ref 0–8.3)
ALBUMIN SERPL-MCNC: 3 G/DL (ref 3.2–4.6)
ALBUMIN/GLOB SERPL: 1 (ref 1–1.9)
ALP SERPL-CCNC: 72 U/L (ref 35–104)
ALT SERPL-CCNC: 8 U/L (ref 8–45)
ANION GAP SERPL CALC-SCNC: 11 MMOL/L (ref 7–16)
ANION GAP SERPL CALC-SCNC: 14 MMOL/L (ref 7–16)
AST SERPL-CCNC: 30 U/L (ref 15–37)
BASOPHILS # BLD: 0.05 K/UL (ref 0–0.2)
BASOPHILS NFR BLD: 0.3 % (ref 0–2)
BILIRUB SERPL-MCNC: 0.5 MG/DL (ref 0–1.2)
BUN SERPL-MCNC: 19 MG/DL (ref 8–23)
BUN SERPL-MCNC: 21 MG/DL (ref 8–23)
CALCIUM SERPL-MCNC: 10.8 MG/DL (ref 8.8–10.2)
CALCIUM SERPL-MCNC: 11 MG/DL (ref 8.8–10.2)
CANCER AG19-9 SERPL-ACNC: 2 U/ML (ref 0–35)
CHLORIDE SERPL-SCNC: 92 MMOL/L (ref 98–107)
CHLORIDE SERPL-SCNC: 93 MMOL/L (ref 98–107)
CO2 SERPL-SCNC: 21 MMOL/L (ref 20–29)
CO2 SERPL-SCNC: 23 MMOL/L (ref 20–29)
CREAT SERPL-MCNC: 0.97 MG/DL (ref 0.6–1.1)
CREAT SERPL-MCNC: 1.07 MG/DL (ref 0.6–1.1)
DIFFERENTIAL METHOD BLD: ABNORMAL
EOSINOPHIL # BLD: 0.28 K/UL (ref 0–0.8)
EOSINOPHIL NFR BLD: 1.8 % (ref 0.5–7.8)
ERYTHROCYTE [DISTWIDTH] IN BLOOD BY AUTOMATED COUNT: 16.3 % (ref 11.9–14.6)
GLOBULIN SER CALC-MCNC: 3 G/DL (ref 2.3–3.5)
GLUCOSE SERPL-MCNC: 110 MG/DL (ref 70–99)
GLUCOSE SERPL-MCNC: 112 MG/DL (ref 70–99)
HCT VFR BLD AUTO: 34.2 % (ref 35.8–46.3)
HGB BLD-MCNC: 11.7 G/DL (ref 11.7–15.4)
IMM GRANULOCYTES # BLD AUTO: 0.13 K/UL (ref 0–0.5)
IMM GRANULOCYTES NFR BLD AUTO: 0.8 % (ref 0–5)
LYMPHOCYTES # BLD: 4.43 K/UL (ref 0.5–4.6)
LYMPHOCYTES NFR BLD: 28.2 % (ref 13–44)
MAGNESIUM SERPL-MCNC: 2 MG/DL (ref 1.8–2.4)
MCH RBC QN AUTO: 33.3 PG (ref 26.1–32.9)
MCHC RBC AUTO-ENTMCNC: 34.2 G/DL (ref 31.4–35)
MCV RBC AUTO: 97.4 FL (ref 82–102)
MONOCYTES # BLD: 1.37 K/UL (ref 0.1–1.3)
MONOCYTES NFR BLD: 8.7 % (ref 4–12)
NEUTS SEG # BLD: 9.45 K/UL (ref 1.7–8.2)
NEUTS SEG NFR BLD: 60.2 % (ref 43–78)
NRBC # BLD: 0.1 K/UL (ref 0–0.2)
PLATELET # BLD AUTO: 115 K/UL (ref 150–450)
PLATELET COMMENT: ABNORMAL
PMV BLD AUTO: 13.3 FL (ref 9.4–12.3)
POTASSIUM SERPL-SCNC: 3.5 MMOL/L (ref 3.5–5.1)
POTASSIUM SERPL-SCNC: ABNORMAL MMOL/L (ref 3.5–5.1)
PROT SERPL-MCNC: 6 G/DL (ref 6.3–8.2)
RBC # BLD AUTO: 3.51 M/UL (ref 4.05–5.2)
RBC MORPH BLD: ABNORMAL
RBC MORPH BLD: ABNORMAL
SODIUM SERPL-SCNC: 127 MMOL/L (ref 136–145)
SODIUM SERPL-SCNC: 128 MMOL/L (ref 136–145)
WBC # BLD AUTO: 15.7 K/UL (ref 4.3–11.1)
WBC MORPH BLD: ABNORMAL

## 2025-02-03 PROCEDURE — 97535 SELF CARE MNGMENT TRAINING: CPT

## 2025-02-03 PROCEDURE — 1100000000 HC RM PRIVATE

## 2025-02-03 PROCEDURE — 36415 COLL VENOUS BLD VENIPUNCTURE: CPT

## 2025-02-03 PROCEDURE — 71045 X-RAY EXAM CHEST 1 VIEW: CPT

## 2025-02-03 PROCEDURE — 80053 COMPREHEN METABOLIC PANEL: CPT

## 2025-02-03 PROCEDURE — 6360000002 HC RX W HCPCS: Performed by: INTERNAL MEDICINE

## 2025-02-03 PROCEDURE — 6370000000 HC RX 637 (ALT 250 FOR IP): Performed by: INTERNAL MEDICINE

## 2025-02-03 PROCEDURE — 2580000003 HC RX 258: Performed by: INTERNAL MEDICINE

## 2025-02-03 PROCEDURE — 6370000000 HC RX 637 (ALT 250 FOR IP): Performed by: STUDENT IN AN ORGANIZED HEALTH CARE EDUCATION/TRAINING PROGRAM

## 2025-02-03 PROCEDURE — 6360000002 HC RX W HCPCS: Performed by: NURSE PRACTITIONER

## 2025-02-03 PROCEDURE — 83735 ASSAY OF MAGNESIUM: CPT

## 2025-02-03 PROCEDURE — 2500000003 HC RX 250 WO HCPCS: Performed by: STUDENT IN AN ORGANIZED HEALTH CARE EDUCATION/TRAINING PROGRAM

## 2025-02-03 PROCEDURE — 85025 COMPLETE CBC W/AUTO DIFF WBC: CPT

## 2025-02-03 PROCEDURE — 6360000002 HC RX W HCPCS: Performed by: PHYSICIAN ASSISTANT

## 2025-02-03 PROCEDURE — 6370000000 HC RX 637 (ALT 250 FOR IP): Performed by: PHYSICIAN ASSISTANT

## 2025-02-03 RX ORDER — POTASSIUM CHLORIDE 1500 MG/1
20 TABLET, EXTENDED RELEASE ORAL 2 TIMES DAILY WITH MEALS
Status: DISCONTINUED | OUTPATIENT
Start: 2025-02-03 | End: 2025-02-05

## 2025-02-03 RX ORDER — FUROSEMIDE 10 MG/ML
20 INJECTION INTRAMUSCULAR; INTRAVENOUS 2 TIMES DAILY
Status: DISCONTINUED | OUTPATIENT
Start: 2025-02-03 | End: 2025-02-04

## 2025-02-03 RX ORDER — LOSARTAN POTASSIUM 50 MG/1
50 TABLET ORAL DAILY
Status: DISCONTINUED | OUTPATIENT
Start: 2025-02-04 | End: 2025-02-06

## 2025-02-03 RX ADMIN — CEFEPIME 2000 MG: 2 INJECTION, POWDER, FOR SOLUTION INTRAVENOUS at 12:46

## 2025-02-03 RX ADMIN — LOPERAMIDE HYDROCHLORIDE 2 MG: 2 CAPSULE ORAL at 11:37

## 2025-02-03 RX ADMIN — PANTOPRAZOLE SODIUM 40 MG: 40 TABLET, DELAYED RELEASE ORAL at 05:35

## 2025-02-03 RX ADMIN — POTASSIUM CHLORIDE 20 MEQ: 1500 TABLET, EXTENDED RELEASE ORAL at 17:10

## 2025-02-03 RX ADMIN — POTASSIUM CHLORIDE 20 MEQ: 1500 TABLET, EXTENDED RELEASE ORAL at 08:26

## 2025-02-03 RX ADMIN — ALLOPURINOL 100 MG: 100 TABLET ORAL at 08:26

## 2025-02-03 RX ADMIN — FUROSEMIDE 20 MG: 10 INJECTION, SOLUTION INTRAMUSCULAR; INTRAVENOUS at 08:28

## 2025-02-03 RX ADMIN — CEFEPIME 2000 MG: 2 INJECTION, POWDER, FOR SOLUTION INTRAVENOUS at 02:26

## 2025-02-03 RX ADMIN — FUROSEMIDE 20 MG: 10 INJECTION, SOLUTION INTRAMUSCULAR; INTRAVENOUS at 17:10

## 2025-02-03 RX ADMIN — FAMOTIDINE 20 MG: 20 TABLET, FILM COATED ORAL at 08:26

## 2025-02-03 RX ADMIN — SODIUM CHLORIDE, PRESERVATIVE FREE 10 ML: 5 INJECTION INTRAVENOUS at 08:28

## 2025-02-03 RX ADMIN — FUROSEMIDE 20 MG: 10 INJECTION, SOLUTION INTRAMUSCULAR; INTRAVENOUS at 00:02

## 2025-02-03 RX ADMIN — EMPAGLIFLOZIN 10 MG: 10 TABLET, FILM COATED ORAL at 08:26

## 2025-02-03 RX ADMIN — LOSARTAN POTASSIUM 25 MG: 25 TABLET, FILM COATED ORAL at 08:26

## 2025-02-03 RX ADMIN — MAGNESIUM GLUCONATE 500 MG ORAL TABLET 400 MG: 500 TABLET ORAL at 08:26

## 2025-02-03 RX ADMIN — METRONIDAZOLE 500 MG: 500 INJECTION, SOLUTION INTRAVENOUS at 11:40

## 2025-02-03 RX ADMIN — SODIUM CHLORIDE, PRESERVATIVE FREE 10 ML: 5 INJECTION INTRAVENOUS at 21:11

## 2025-02-03 ASSESSMENT — PAIN SCALES - GENERAL: PAINLEVEL_OUTOF10: 0

## 2025-02-03 NOTE — CARE COORDINATION
Chart reviewed by RNCM.    Discharge plan remains for patient to discharge home with [facility].    Please consult case management if any new discharge needs arise.

## 2025-02-04 LAB
ALBUMIN SERPL-MCNC: 3 G/DL (ref 3.2–4.6)
ALBUMIN/GLOB SERPL: 1 (ref 1–1.9)
ALP SERPL-CCNC: 76 U/L (ref 35–104)
ALT SERPL-CCNC: 7 U/L (ref 8–45)
ANION GAP SERPL CALC-SCNC: 13 MMOL/L (ref 7–16)
AST SERPL-CCNC: 31 U/L (ref 15–37)
BASOPHILS # BLD: 0.06 K/UL (ref 0–0.2)
BASOPHILS NFR BLD: 0.4 % (ref 0–2)
BILIRUB SERPL-MCNC: 0.5 MG/DL (ref 0–1.2)
BUN SERPL-MCNC: 21 MG/DL (ref 8–23)
CALCIUM SERPL-MCNC: 11.1 MG/DL (ref 8.8–10.2)
CHLORIDE SERPL-SCNC: 95 MMOL/L (ref 98–107)
CO2 SERPL-SCNC: 23 MMOL/L (ref 20–29)
CREAT SERPL-MCNC: 1 MG/DL (ref 0.6–1.1)
DIFFERENTIAL METHOD BLD: ABNORMAL
EOSINOPHIL # BLD: 0.21 K/UL (ref 0–0.8)
EOSINOPHIL NFR BLD: 1.3 % (ref 0.5–7.8)
ERYTHROCYTE [DISTWIDTH] IN BLOOD BY AUTOMATED COUNT: 16.3 % (ref 11.9–14.6)
GLOBULIN SER CALC-MCNC: 2.9 G/DL (ref 2.3–3.5)
GLUCOSE SERPL-MCNC: 102 MG/DL (ref 70–99)
HCT VFR BLD AUTO: 35.2 % (ref 35.8–46.3)
HGB BLD-MCNC: 11.8 G/DL (ref 11.7–15.4)
IMM GRANULOCYTES # BLD AUTO: 0.16 K/UL (ref 0–0.5)
IMM GRANULOCYTES NFR BLD AUTO: 1 % (ref 0–5)
LYMPHOCYTES # BLD: 4.56 K/UL (ref 0.5–4.6)
LYMPHOCYTES NFR BLD: 28.5 % (ref 13–44)
MCH RBC QN AUTO: 32.5 PG (ref 26.1–32.9)
MCHC RBC AUTO-ENTMCNC: 33.5 G/DL (ref 31.4–35)
MCV RBC AUTO: 97 FL (ref 82–102)
MONOCYTES # BLD: 1.57 K/UL (ref 0.1–1.3)
MONOCYTES NFR BLD: 9.8 % (ref 4–12)
NEUTS SEG # BLD: 9.44 K/UL (ref 1.7–8.2)
NEUTS SEG NFR BLD: 59 % (ref 43–78)
NRBC # BLD: 0.26 K/UL (ref 0–0.2)
NT PRO BNP: 3540 PG/ML (ref 0–450)
PLATELET # BLD AUTO: 126 K/UL (ref 150–450)
PLATELET COMMENT: SLIGHT
PMV BLD AUTO: 13.4 FL (ref 9.4–12.3)
POTASSIUM SERPL-SCNC: 3.8 MMOL/L (ref 3.5–5.1)
PROCALCITONIN SERPL-MCNC: 0.24 NG/ML (ref 0–0.1)
PROT SERPL-MCNC: 5.9 G/DL (ref 6.3–8.2)
RBC # BLD AUTO: 3.63 M/UL (ref 4.05–5.2)
RBC MORPH BLD: ABNORMAL
RBC MORPH BLD: ABNORMAL
SODIUM SERPL-SCNC: 131 MMOL/L (ref 136–145)
WBC # BLD AUTO: 16 K/UL (ref 4.3–11.1)
WBC MORPH BLD: ABNORMAL

## 2025-02-04 PROCEDURE — 94761 N-INVAS EAR/PLS OXIMETRY MLT: CPT

## 2025-02-04 PROCEDURE — 6360000002 HC RX W HCPCS: Performed by: INTERNAL MEDICINE

## 2025-02-04 PROCEDURE — 6370000000 HC RX 637 (ALT 250 FOR IP): Performed by: STUDENT IN AN ORGANIZED HEALTH CARE EDUCATION/TRAINING PROGRAM

## 2025-02-04 PROCEDURE — 6360000002 HC RX W HCPCS: Performed by: PHYSICIAN ASSISTANT

## 2025-02-04 PROCEDURE — 87507 IADNA-DNA/RNA PROBE TQ 12-25: CPT

## 2025-02-04 PROCEDURE — 97535 SELF CARE MNGMENT TRAINING: CPT

## 2025-02-04 PROCEDURE — 6370000000 HC RX 637 (ALT 250 FOR IP): Performed by: PHYSICIAN ASSISTANT

## 2025-02-04 PROCEDURE — 83880 ASSAY OF NATRIURETIC PEPTIDE: CPT

## 2025-02-04 PROCEDURE — 36415 COLL VENOUS BLD VENIPUNCTURE: CPT

## 2025-02-04 PROCEDURE — 2580000003 HC RX 258: Performed by: INTERNAL MEDICINE

## 2025-02-04 PROCEDURE — 6370000000 HC RX 637 (ALT 250 FOR IP): Performed by: NURSE PRACTITIONER

## 2025-02-04 PROCEDURE — 2500000003 HC RX 250 WO HCPCS: Performed by: STUDENT IN AN ORGANIZED HEALTH CARE EDUCATION/TRAINING PROGRAM

## 2025-02-04 PROCEDURE — 97530 THERAPEUTIC ACTIVITIES: CPT

## 2025-02-04 PROCEDURE — 99232 SBSQ HOSP IP/OBS MODERATE 35: CPT | Performed by: INTERNAL MEDICINE

## 2025-02-04 PROCEDURE — 1100000000 HC RM PRIVATE

## 2025-02-04 PROCEDURE — 6360000002 HC RX W HCPCS: Performed by: STUDENT IN AN ORGANIZED HEALTH CARE EDUCATION/TRAINING PROGRAM

## 2025-02-04 PROCEDURE — 80053 COMPREHEN METABOLIC PANEL: CPT

## 2025-02-04 PROCEDURE — 6370000000 HC RX 637 (ALT 250 FOR IP): Performed by: INTERNAL MEDICINE

## 2025-02-04 PROCEDURE — 85025 COMPLETE CBC W/AUTO DIFF WBC: CPT

## 2025-02-04 PROCEDURE — 84145 PROCALCITONIN (PCT): CPT

## 2025-02-04 RX ORDER — TRAMADOL HYDROCHLORIDE 50 MG/1
50 TABLET ORAL ONCE
Status: COMPLETED | OUTPATIENT
Start: 2025-02-04 | End: 2025-02-04

## 2025-02-04 RX ORDER — FUROSEMIDE 20 MG/1
20 TABLET ORAL 2 TIMES DAILY
Status: DISCONTINUED | OUTPATIENT
Start: 2025-02-04 | End: 2025-02-05

## 2025-02-04 RX ORDER — CHOLESTYRAMINE LIGHT 4 G/5.7G
4 POWDER, FOR SUSPENSION ORAL 2 TIMES DAILY
Status: DISCONTINUED | OUTPATIENT
Start: 2025-02-04 | End: 2025-02-09 | Stop reason: HOSPADM

## 2025-02-04 RX ADMIN — FUROSEMIDE 20 MG: 20 TABLET ORAL at 17:25

## 2025-02-04 RX ADMIN — ALLOPURINOL 100 MG: 100 TABLET ORAL at 08:54

## 2025-02-04 RX ADMIN — TRAMADOL HYDROCHLORIDE 50 MG: 50 TABLET, COATED ORAL at 19:20

## 2025-02-04 RX ADMIN — CEFEPIME 2000 MG: 2 INJECTION, POWDER, FOR SOLUTION INTRAVENOUS at 13:20

## 2025-02-04 RX ADMIN — LOSARTAN POTASSIUM 50 MG: 50 TABLET, FILM COATED ORAL at 08:55

## 2025-02-04 RX ADMIN — EMPAGLIFLOZIN 10 MG: 10 TABLET, FILM COATED ORAL at 08:54

## 2025-02-04 RX ADMIN — POTASSIUM CHLORIDE 20 MEQ: 1500 TABLET, EXTENDED RELEASE ORAL at 08:55

## 2025-02-04 RX ADMIN — METRONIDAZOLE 500 MG: 500 INJECTION, SOLUTION INTRAVENOUS at 00:25

## 2025-02-04 RX ADMIN — POTASSIUM CHLORIDE 20 MEQ: 1500 TABLET, EXTENDED RELEASE ORAL at 17:25

## 2025-02-04 RX ADMIN — SODIUM CHLORIDE, PRESERVATIVE FREE 10 ML: 5 INJECTION INTRAVENOUS at 19:21

## 2025-02-04 RX ADMIN — PANTOPRAZOLE SODIUM 40 MG: 40 TABLET, DELAYED RELEASE ORAL at 05:48

## 2025-02-04 RX ADMIN — MAGNESIUM GLUCONATE 500 MG ORAL TABLET 400 MG: 500 TABLET ORAL at 08:55

## 2025-02-04 RX ADMIN — FAMOTIDINE 20 MG: 20 TABLET, FILM COATED ORAL at 08:54

## 2025-02-04 RX ADMIN — CHOLESTYRAMINE 4 G: 4 POWDER, FOR SUSPENSION ORAL at 21:43

## 2025-02-04 RX ADMIN — ONDANSETRON 4 MG: 2 INJECTION, SOLUTION INTRAMUSCULAR; INTRAVENOUS at 19:06

## 2025-02-04 RX ADMIN — FUROSEMIDE 20 MG: 10 INJECTION, SOLUTION INTRAMUSCULAR; INTRAVENOUS at 08:54

## 2025-02-04 RX ADMIN — CHOLESTYRAMINE 4 G: 4 POWDER, FOR SUSPENSION ORAL at 12:04

## 2025-02-04 RX ADMIN — METRONIDAZOLE 500 MG: 500 INJECTION, SOLUTION INTRAVENOUS at 12:08

## 2025-02-04 RX ADMIN — SODIUM CHLORIDE, PRESERVATIVE FREE 10 ML: 5 INJECTION INTRAVENOUS at 08:55

## 2025-02-04 ASSESSMENT — PAIN DESCRIPTION - ORIENTATION: ORIENTATION: MID

## 2025-02-04 ASSESSMENT — PAIN DESCRIPTION - LOCATION: LOCATION: ABDOMEN

## 2025-02-04 ASSESSMENT — PAIN SCALES - GENERAL: PAINLEVEL_OUTOF10: 5

## 2025-02-04 ASSESSMENT — PAIN DESCRIPTION - DESCRIPTORS: DESCRIPTORS: CRAMPING;NAGGING

## 2025-02-04 NOTE — PLAN OF CARE
Problem: Discharge Planning  Goal: Discharge to home or other facility with appropriate resources  Outcome: Progressing     Problem: Pain  Goal: Verbalizes/displays adequate comfort level or baseline comfort level  Outcome: Progressing     Problem: Safety - Adult  Goal: Free from fall injury  Outcome: Progressing  Flowsheets (Taken 2/3/2025 1120 by Sarai Yeager, RN)  Free From Fall Injury: Instruct family/caregiver on patient safety     Problem: Skin/Tissue Integrity  Goal: Skin integrity remains intact  Description: 1.  Monitor for areas of redness and/or skin breakdown  2.  Assess vascular access sites hourly  3.  Every 4-6 hours minimum:  Change oxygen saturation probe site  4.  Every 4-6 hours:  If on nasal continuous positive airway pressure, respiratory therapy assess nares and determine need for appliance change or resting period  Outcome: Progressing  Flowsheets (Taken 2/3/2025 1120 by Sarai Yeager, RN)  Skin Integrity Remains Intact: Monitor for areas of redness and/or skin breakdown     Problem: ABCDS Injury Assessment  Goal: Absence of physical injury  Outcome: Progressing     Problem: Respiratory - Adult  Goal: Achieves optimal ventilation and oxygenation  Outcome: Progressing     Problem: Cardiovascular - Adult  Goal: Maintains optimal cardiac output and hemodynamic stability  Outcome: Progressing     Problem: Gastrointestinal - Adult  Goal: Minimal or absence of nausea and vomiting  Outcome: Progressing  Goal: Maintains or returns to baseline bowel function  Outcome: Progressing  Goal: Maintains adequate nutritional intake  Outcome: Progressing     Problem: Genitourinary - Adult  Goal: Absence of urinary retention  Outcome: Progressing

## 2025-02-04 NOTE — CARE COORDINATION
Chart reviewed by RNCM and discussed in IDR.    Family requests STR at discharge. Skilled choice list provided. Referrals made to [facility]. Awaiting 3rd selection. CM following.    13:35 - Patient accepted to [facility].

## 2025-02-04 NOTE — PLAN OF CARE
Problem: Discharge Planning  Goal: Discharge to home or other facility with appropriate resources  Outcome: Progressing     Problem: Pain  Goal: Verbalizes/displays adequate comfort level or baseline comfort level  Outcome: Progressing     Problem: Safety - Adult  Goal: Free from fall injury  Outcome: Progressing     Problem: Skin/Tissue Integrity  Goal: Skin integrity remains intact  Description: 1.  Monitor for areas of redness and/or skin breakdown  2.  Assess vascular access sites hourly  3.  Every 4-6 hours minimum:  Change oxygen saturation probe site  4.  Every 4-6 hours:  If on nasal continuous positive airway pressure, respiratory therapy assess nares and determine need for appliance change or resting period  Outcome: Progressing     Problem: ABCDS Injury Assessment  Goal: Absence of physical injury  Outcome: Progressing     Problem: Respiratory - Adult  Goal: Achieves optimal ventilation and oxygenation  Outcome: Progressing     Problem: Cardiovascular - Adult  Goal: Maintains optimal cardiac output and hemodynamic stability  Outcome: Progressing     Problem: Gastrointestinal - Adult  Goal: Minimal or absence of nausea and vomiting  Outcome: Progressing  Goal: Maintains or returns to baseline bowel function  Outcome: Progressing  Goal: Maintains adequate nutritional intake  Outcome: Progressing     Problem: Genitourinary - Adult  Goal: Absence of urinary retention  Outcome: Progressing

## 2025-02-04 NOTE — CONSULTS
Consult Note            Date:2/4/2025        Patient Name:Jacklyn Castillo     YOB: 1934     Age:89 y.o.    Inpatient consult to GI  Consult performed by: Cindy Choudhary APRN - CNP  Consult ordered by: Ryne Birmingham DO      IP Consult to Ped Gastroenterology  Consult performed by: Cindy Choudhary APRN - CNP  Consult ordered by: Ryne Birmingham DO          Chief Complaint     Chief Complaint   Patient presents with    Shortness of Breath        History Obtained From   patient, family member - son    History of Present Illness   Jacklyn Castillo is a 89 yo female who originally presented to the ED with nausea, abdominal pain and diarrhea. PMH includes colon cancer, HTN. GI is reconsulted for ongoing uncontrollable diarrhea. We were originally consulted on 1/29 for prominent CBD. On 1/31 patient underwent EUS where she was found to have cholecystitis s/p drainage of the gallbladder. Since that procedure the patient began having uncontrollable diarrhea. She does have a history of diarrhea due to colon cancer, however this is significantly worse. The son states she was having to be cleaned up about every hour and she would not feel when she would go. She denies any nausea or vomiting. She denies any bloody or black stool. The diarrhea has slowed down with the imodium but the son is worried about bringing her back home with how much diarrhea she is having. Stool cultures and c. Diff were negative on 1/29.     Labs: C. 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

## 2025-02-05 ENCOUNTER — APPOINTMENT (OUTPATIENT)
Dept: CT IMAGING | Age: 89
DRG: 444 | End: 2025-02-05
Payer: MEDICARE

## 2025-02-05 PROBLEM — B35.6 TINEA INGUINALIS: Status: ACTIVE | Noted: 2025-02-05

## 2025-02-05 PROBLEM — D72.829 LEUKOCYTOSIS: Status: ACTIVE | Noted: 2025-02-05

## 2025-02-05 LAB
ALBUMIN SERPL-MCNC: 3.2 G/DL (ref 3.2–4.6)
ALBUMIN/GLOB SERPL: 1.1 (ref 1–1.9)
ALP SERPL-CCNC: 80 U/L (ref 35–104)
ALT SERPL-CCNC: 9 U/L (ref 8–45)
ANION GAP SERPL CALC-SCNC: 10 MMOL/L (ref 7–16)
AST SERPL-CCNC: 33 U/L (ref 15–37)
BASOPHILS # BLD: 0.09 K/UL (ref 0–0.2)
BASOPHILS NFR BLD: 0.5 % (ref 0–2)
BILIRUB SERPL-MCNC: 0.5 MG/DL (ref 0–1.2)
BUN SERPL-MCNC: 19 MG/DL (ref 8–23)
CALCIUM SERPL-MCNC: 11.6 MG/DL (ref 8.8–10.2)
CHLORIDE SERPL-SCNC: 95 MMOL/L (ref 98–107)
CO2 SERPL-SCNC: 25 MMOL/L (ref 20–29)
CREAT SERPL-MCNC: 0.86 MG/DL (ref 0.6–1.1)
DIFFERENTIAL METHOD BLD: ABNORMAL
EOSINOPHIL # BLD: 0.18 K/UL (ref 0–0.8)
EOSINOPHIL NFR BLD: 1 % (ref 0.5–7.8)
ERYTHROCYTE [DISTWIDTH] IN BLOOD BY AUTOMATED COUNT: 17.1 % (ref 11.9–14.6)
GLOBULIN SER CALC-MCNC: 2.9 G/DL (ref 2.3–3.5)
GLUCOSE BLD STRIP.AUTO-MCNC: 107 MG/DL (ref 65–100)
GLUCOSE SERPL-MCNC: 109 MG/DL (ref 70–99)
HCT VFR BLD AUTO: 34.7 % (ref 35.8–46.3)
HGB BLD-MCNC: 11.8 G/DL (ref 11.7–15.4)
IMM GRANULOCYTES # BLD AUTO: 0.21 K/UL (ref 0–0.5)
IMM GRANULOCYTES NFR BLD AUTO: 1.2 % (ref 0–5)
LYMPHOCYTES # BLD: 5.91 K/UL (ref 0.5–4.6)
LYMPHOCYTES NFR BLD: 33 % (ref 13–44)
MCH RBC QN AUTO: 33.3 PG (ref 26.1–32.9)
MCHC RBC AUTO-ENTMCNC: 34 G/DL (ref 31.4–35)
MCV RBC AUTO: 98 FL (ref 82–102)
MONOCYTES # BLD: 1.65 K/UL (ref 0.1–1.3)
MONOCYTES NFR BLD: 9.2 % (ref 4–12)
NEUTS SEG # BLD: 9.86 K/UL (ref 1.7–8.2)
NEUTS SEG NFR BLD: 55.1 % (ref 43–78)
NRBC # BLD: 0.41 K/UL (ref 0–0.2)
PATH REV BLD -IMP: NORMAL
PLATELET # BLD AUTO: 121 K/UL (ref 150–450)
PLATELET COMMENT: SLIGHT
PMV BLD AUTO: 13.3 FL (ref 9.4–12.3)
POTASSIUM SERPL-SCNC: 4.1 MMOL/L (ref 3.5–5.1)
PROCALCITONIN SERPL-MCNC: 0.23 NG/ML (ref 0–0.1)
PROT SERPL-MCNC: 6.1 G/DL (ref 6.3–8.2)
RBC # BLD AUTO: 3.54 M/UL (ref 4.05–5.2)
RBC MORPH BLD: ABNORMAL
RBC MORPH BLD: ABNORMAL
SERVICE CMNT-IMP: ABNORMAL
SODIUM SERPL-SCNC: 130 MMOL/L (ref 136–145)
WBC # BLD AUTO: 17.9 K/UL (ref 4.3–11.1)
WBC MORPH BLD: ABNORMAL

## 2025-02-05 PROCEDURE — 36415 COLL VENOUS BLD VENIPUNCTURE: CPT

## 2025-02-05 PROCEDURE — 82962 GLUCOSE BLOOD TEST: CPT

## 2025-02-05 PROCEDURE — 6360000002 HC RX W HCPCS: Performed by: INTERNAL MEDICINE

## 2025-02-05 PROCEDURE — 85025 COMPLETE CBC W/AUTO DIFF WBC: CPT

## 2025-02-05 PROCEDURE — 2700000000 HC OXYGEN THERAPY PER DAY

## 2025-02-05 PROCEDURE — 6360000002 HC RX W HCPCS: Performed by: STUDENT IN AN ORGANIZED HEALTH CARE EDUCATION/TRAINING PROGRAM

## 2025-02-05 PROCEDURE — 2500000003 HC RX 250 WO HCPCS: Performed by: INTERNAL MEDICINE

## 2025-02-05 PROCEDURE — 87040 BLOOD CULTURE FOR BACTERIA: CPT

## 2025-02-05 PROCEDURE — 84145 PROCALCITONIN (PCT): CPT

## 2025-02-05 PROCEDURE — 6360000002 HC RX W HCPCS: Performed by: NURSE PRACTITIONER

## 2025-02-05 PROCEDURE — 6370000000 HC RX 637 (ALT 250 FOR IP): Performed by: INTERNAL MEDICINE

## 2025-02-05 PROCEDURE — 1100000003 HC PRIVATE W/ TELEMETRY

## 2025-02-05 PROCEDURE — 6370000000 HC RX 637 (ALT 250 FOR IP): Performed by: PHYSICIAN ASSISTANT

## 2025-02-05 PROCEDURE — 6370000000 HC RX 637 (ALT 250 FOR IP): Performed by: STUDENT IN AN ORGANIZED HEALTH CARE EDUCATION/TRAINING PROGRAM

## 2025-02-05 PROCEDURE — 6360000004 HC RX CONTRAST MEDICATION: Performed by: STUDENT IN AN ORGANIZED HEALTH CARE EDUCATION/TRAINING PROGRAM

## 2025-02-05 PROCEDURE — 99222 1ST HOSP IP/OBS MODERATE 55: CPT | Performed by: STUDENT IN AN ORGANIZED HEALTH CARE EDUCATION/TRAINING PROGRAM

## 2025-02-05 PROCEDURE — 97530 THERAPEUTIC ACTIVITIES: CPT

## 2025-02-05 PROCEDURE — 6360000002 HC RX W HCPCS: Performed by: PHYSICIAN ASSISTANT

## 2025-02-05 PROCEDURE — 80053 COMPREHEN METABOLIC PANEL: CPT

## 2025-02-05 PROCEDURE — 70498 CT ANGIOGRAPHY NECK: CPT

## 2025-02-05 PROCEDURE — 2580000003 HC RX 258: Performed by: INTERNAL MEDICINE

## 2025-02-05 PROCEDURE — 2500000003 HC RX 250 WO HCPCS: Performed by: STUDENT IN AN ORGANIZED HEALTH CARE EDUCATION/TRAINING PROGRAM

## 2025-02-05 PROCEDURE — 70450 CT HEAD/BRAIN W/O DYE: CPT

## 2025-02-05 RX ORDER — FUROSEMIDE 10 MG/ML
80 INJECTION INTRAMUSCULAR; INTRAVENOUS ONCE
Status: COMPLETED | OUTPATIENT
Start: 2025-02-05 | End: 2025-02-05

## 2025-02-05 RX ORDER — SODIUM CHLORIDE 9 MG/ML
INJECTION, SOLUTION INTRAVENOUS CONTINUOUS
Status: DISCONTINUED | OUTPATIENT
Start: 2025-02-05 | End: 2025-02-05

## 2025-02-05 RX ORDER — ASPIRIN 81 MG/1
81 TABLET, CHEWABLE ORAL DAILY
Status: DISCONTINUED | OUTPATIENT
Start: 2025-02-06 | End: 2025-02-05

## 2025-02-05 RX ORDER — ONDANSETRON 4 MG/1
4 TABLET, ORALLY DISINTEGRATING ORAL EVERY 8 HOURS PRN
Status: DISCONTINUED | OUTPATIENT
Start: 2025-02-05 | End: 2025-02-09 | Stop reason: HOSPADM

## 2025-02-05 RX ORDER — ASPIRIN 300 MG/1
300 SUPPOSITORY RECTAL DAILY
Status: DISCONTINUED | OUTPATIENT
Start: 2025-02-06 | End: 2025-02-09 | Stop reason: HOSPADM

## 2025-02-05 RX ORDER — POTASSIUM CHLORIDE 1500 MG/1
20 TABLET, EXTENDED RELEASE ORAL
Status: DISCONTINUED | OUTPATIENT
Start: 2025-02-06 | End: 2025-02-07

## 2025-02-05 RX ORDER — ASPIRIN 81 MG/1
81 TABLET, CHEWABLE ORAL DAILY
Status: DISCONTINUED | OUTPATIENT
Start: 2025-02-06 | End: 2025-02-09 | Stop reason: HOSPADM

## 2025-02-05 RX ORDER — FLUCONAZOLE 2 MG/ML
200 INJECTION, SOLUTION INTRAVENOUS EVERY 24 HOURS
Status: DISCONTINUED | OUTPATIENT
Start: 2025-02-06 | End: 2025-02-06

## 2025-02-05 RX ORDER — LABETALOL HYDROCHLORIDE 5 MG/ML
10 INJECTION, SOLUTION INTRAVENOUS
Status: DISCONTINUED | OUTPATIENT
Start: 2025-02-05 | End: 2025-02-09 | Stop reason: HOSPADM

## 2025-02-05 RX ORDER — FUROSEMIDE 10 MG/ML
40 INJECTION INTRAMUSCULAR; INTRAVENOUS ONCE
Status: DISCONTINUED | OUTPATIENT
Start: 2025-02-05 | End: 2025-02-05

## 2025-02-05 RX ORDER — FLUCONAZOLE 100 MG/1
200 TABLET ORAL DAILY
Status: DISCONTINUED | OUTPATIENT
Start: 2025-02-05 | End: 2025-02-05

## 2025-02-05 RX ORDER — ONDANSETRON 2 MG/ML
4 INJECTION INTRAMUSCULAR; INTRAVENOUS EVERY 6 HOURS PRN
Status: DISCONTINUED | OUTPATIENT
Start: 2025-02-05 | End: 2025-02-09 | Stop reason: HOSPADM

## 2025-02-05 RX ORDER — FUROSEMIDE 20 MG/1
20 TABLET ORAL DAILY
Status: DISCONTINUED | OUTPATIENT
Start: 2025-02-06 | End: 2025-02-07

## 2025-02-05 RX ORDER — FLUCONAZOLE 100 MG/1
200 TABLET ORAL DAILY
Status: DISCONTINUED | OUTPATIENT
Start: 2025-02-06 | End: 2025-02-05

## 2025-02-05 RX ORDER — IOPAMIDOL 755 MG/ML
50 INJECTION, SOLUTION INTRAVASCULAR
Status: COMPLETED | OUTPATIENT
Start: 2025-02-05 | End: 2025-02-05

## 2025-02-05 RX ORDER — ATORVASTATIN CALCIUM 40 MG/1
40 TABLET, FILM COATED ORAL NIGHTLY
Status: DISCONTINUED | OUTPATIENT
Start: 2025-02-05 | End: 2025-02-09 | Stop reason: HOSPADM

## 2025-02-05 RX ORDER — MECLIZINE HCL 12.5 MG 12.5 MG/1
12.5 TABLET ORAL 3 TIMES DAILY PRN
Status: DISCONTINUED | OUTPATIENT
Start: 2025-02-05 | End: 2025-02-05

## 2025-02-05 RX ORDER — TRAMADOL HYDROCHLORIDE 50 MG/1
50 TABLET ORAL EVERY 6 HOURS PRN
Status: DISCONTINUED | OUTPATIENT
Start: 2025-02-05 | End: 2025-02-06

## 2025-02-05 RX ADMIN — WATER 2000 MG: 1 INJECTION INTRAMUSCULAR; INTRAVENOUS; SUBCUTANEOUS at 10:12

## 2025-02-05 RX ADMIN — MAGNESIUM GLUCONATE 500 MG ORAL TABLET 400 MG: 500 TABLET ORAL at 08:28

## 2025-02-05 RX ADMIN — CHOLESTYRAMINE 4 G: 4 POWDER, FOR SUSPENSION ORAL at 08:27

## 2025-02-05 RX ADMIN — FAMOTIDINE 20 MG: 20 TABLET, FILM COATED ORAL at 08:27

## 2025-02-05 RX ADMIN — METRONIDAZOLE 500 MG: 500 INJECTION, SOLUTION INTRAVENOUS at 13:26

## 2025-02-05 RX ADMIN — SODIUM CHLORIDE, PRESERVATIVE FREE 10 ML: 5 INJECTION INTRAVENOUS at 08:28

## 2025-02-05 RX ADMIN — POTASSIUM CHLORIDE 20 MEQ: 1500 TABLET, EXTENDED RELEASE ORAL at 08:28

## 2025-02-05 RX ADMIN — IOPAMIDOL 50 ML: 755 INJECTION, SOLUTION INTRAVENOUS at 17:14

## 2025-02-05 RX ADMIN — METRONIDAZOLE 500 MG: 500 INJECTION, SOLUTION INTRAVENOUS at 01:04

## 2025-02-05 RX ADMIN — WATER 2000 MG: 1 INJECTION INTRAMUSCULAR; INTRAVENOUS; SUBCUTANEOUS at 16:44

## 2025-02-05 RX ADMIN — FUROSEMIDE 80 MG: 10 INJECTION, SOLUTION INTRAMUSCULAR; INTRAVENOUS at 18:27

## 2025-02-05 RX ADMIN — ALLOPURINOL 100 MG: 100 TABLET ORAL at 08:27

## 2025-02-05 RX ADMIN — ONDANSETRON 4 MG: 2 INJECTION, SOLUTION INTRAMUSCULAR; INTRAVENOUS at 08:13

## 2025-02-05 RX ADMIN — EMPAGLIFLOZIN 10 MG: 10 TABLET, FILM COATED ORAL at 08:27

## 2025-02-05 RX ADMIN — SODIUM CHLORIDE: 9 INJECTION, SOLUTION INTRAVENOUS at 17:05

## 2025-02-05 RX ADMIN — MECLIZINE 12.5 MG: 12.5 TABLET ORAL at 10:12

## 2025-02-05 RX ADMIN — ATORVASTATIN CALCIUM 40 MG: 40 TABLET, FILM COATED ORAL at 20:56

## 2025-02-05 RX ADMIN — LOSARTAN POTASSIUM 50 MG: 50 TABLET, FILM COATED ORAL at 08:28

## 2025-02-05 RX ADMIN — PANTOPRAZOLE SODIUM 40 MG: 40 TABLET, DELAYED RELEASE ORAL at 04:52

## 2025-02-05 RX ADMIN — FUROSEMIDE 20 MG: 20 TABLET ORAL at 08:27

## 2025-02-05 RX ADMIN — TRAMADOL HYDROCHLORIDE 50 MG: 50 TABLET, COATED ORAL at 12:08

## 2025-02-05 RX ADMIN — FLUCONAZOLE 200 MG: 100 TABLET ORAL at 10:12

## 2025-02-05 ASSESSMENT — PAIN SCALES - GENERAL: PAINLEVEL_OUTOF10: 6

## 2025-02-05 ASSESSMENT — PAIN DESCRIPTION - DESCRIPTORS: DESCRIPTORS: SHARP;STABBING;THROBBING

## 2025-02-05 ASSESSMENT — PAIN DESCRIPTION - LOCATION: LOCATION: BACK;LEG

## 2025-02-05 ASSESSMENT — PAIN DESCRIPTION - ORIENTATION: ORIENTATION: LEFT

## 2025-02-05 NOTE — ACP (ADVANCE CARE PLANNING)
Advance Care Planning Note   Admit Date:  2025  7:04 PM   Name:  Jacklyn Castillo   Age:  89 y.o.  Sex:  female  :  10/13/1934   MRN:  [id number]   Room:  [facility]/01    Jacklyn Castillo has capacity to make her own decisions:   No- acute change mental status suspect acute cva    If pt unable to make decisions, POA/surrogate decision maker:  Son-- rocio LUND    Other people present:   Oldest son    Patient / surrogate decision-maker directed code status:  DNR/DNI    Other ACP topics discussed, if applicable:   None    Patient or surrogate consented to discussion of the current conditions, workup, management plans, prognosis, and the risk for further deterioration.  Time spent: 17 minutes in direct discussion.      Signed:  Ryne Birmingham DO

## 2025-02-05 NOTE — CONSULTS
Neurology Consult Note       History:   89-year-old female admitted initially with NVD, found to have urinary tract infection, possible cholecystitis, diverticulitis.  Course complicated by hyponatremia in the setting of frequent diuresis. She is being seen for code S for reportedly abrupt onset confusion, possible left facial droop and left-sided weakness. A code S was called at 4:49 PM. Neurology arrived to the bedside at 4:52 PM. Initial NIHSS was 17.       Exam: Pertinent positives and negatives include:  NIHSS Score: 17  1a-Level of Consciousness 2  1b-What is Month/Age 2  1c-Open/Close Eyes&Hand 2  2 -Best Gaze 0  3 -Visual Fields 0  4 -Facial Palsy 1  5a-Motor-Left Arm 2  5b-Motor-Right Arm 2  6a-Motor-Left Leg 2  6b-Motor-Right Leg 1  7 -Limb Ataxia 0  8 -Sensory 0  9 -Best Language 3  10-Dysarthria 0  11-Extinction/Inattention 0    Imaging and review of data:  CT head without acute pathology.  CT angiogram without large vessel stenoses, pending formal read.      Assessment:     89-year-old female seen as a code S with abrupt onset confusional state. Initial NIHSS was 17. The patient was not a candidate for tenecteplase because there are no discrete neurologic deficits detected . The patient was not a candidate for mechanical thrombectomy because of no large vessel occlusion on CTA, pending formal read.       Plan:     While acute confusional state is not a typical stroke presentation, it is possible in rare circumstances.  Proceed with MRI brain to rule out CVA.  If negative no further stroke workup would be necessary.    In the meantime, permissive hypertension <220/110 for the next 24 hours, then gradually lower to goal -180, DBP <100, MAPs >65.      Lavon Bolivar, DO  Neurology      I spent 35 minutes today with the patient, which included chart review, obtaining history from the patient/family/other providers, physical exam, documentation, and reviewing pertinent testing.

## 2025-02-05 NOTE — PLAN OF CARE
Problem: Discharge Planning  Goal: Discharge to home or other facility with appropriate resources  2/5/2025 0215 by Rhonda Mcqueen RN  Outcome: Progressing  2/4/2025 1827 by Estephanie Wilson RN  Outcome: Progressing     Problem: Pain  Goal: Verbalizes/displays adequate comfort level or baseline comfort level  2/5/2025 0215 by Rhonda Mcqueen RN  Outcome: Progressing  2/4/2025 1827 by Estephanie Wilson RN  Outcome: Progressing     Problem: Safety - Adult  Goal: Free from fall injury  2/5/2025 0215 by Rhonda Mcqueen RN  Outcome: Progressing  2/4/2025 1827 by Estephanie Wilson RN  Outcome: Progressing     Problem: Skin/Tissue Integrity  Goal: Skin integrity remains intact  Description: 1.  Monitor for areas of redness and/or skin breakdown  2.  Assess vascular access sites hourly  3.  Every 4-6 hours minimum:  Change oxygen saturation probe site  4.  Every 4-6 hours:  If on nasal continuous positive airway pressure, respiratory therapy assess nares and determine need for appliance change or resting period  2/5/2025 0215 by Rhonda Mcqueen RN  Outcome: Progressing  2/4/2025 1827 by Estephanie Wilson RN  Outcome: Progressing     Problem: ABCDS Injury Assessment  Goal: Absence of physical injury  2/5/2025 0215 by Rhonda Mcqueen RN  Outcome: Progressing  2/4/2025 1827 by Estephanie Wilson RN  Outcome: Progressing     Problem: Respiratory - Adult  Goal: Achieves optimal ventilation and oxygenation  2/5/2025 0215 by Rhonda Mcqueen RN  Outcome: Progressing  2/4/2025 1827 by Estephanie Wilson RN  Outcome: Progressing     Problem: Cardiovascular - Adult  Goal: Maintains optimal cardiac output and hemodynamic stability  2/5/2025 0215 by Rhonda Mcqueen RN  Outcome: Progressing  2/4/2025 1827 by Estephanie Wilson RN  Outcome: Progressing     Problem: Gastrointestinal - Adult  Goal: Minimal or absence of nausea and vomiting  2/5/2025 0215 by Rhonda Mcqueen RN  Outcome: Progressing  2/4/2025 1827 by Estephanie Wilson RN  Outcome: Progressing  Goal: Maintains or

## 2025-02-06 ENCOUNTER — APPOINTMENT (OUTPATIENT)
Dept: MRI IMAGING | Age: 89
DRG: 444 | End: 2025-02-06
Payer: MEDICARE

## 2025-02-06 LAB
ANION GAP SERPL CALC-SCNC: 10 MMOL/L (ref 7–16)
BASOPHILS # BLD: 0.07 K/UL (ref 0–0.2)
BASOPHILS NFR BLD: 0.4 % (ref 0–2)
BUN SERPL-MCNC: 16 MG/DL (ref 8–23)
CALCIUM SERPL-MCNC: 11.2 MG/DL (ref 8.8–10.2)
CHLORIDE SERPL-SCNC: 96 MMOL/L (ref 98–107)
CHOLEST SERPL-MCNC: 124 MG/DL (ref 0–200)
CO2 SERPL-SCNC: 25 MMOL/L (ref 20–29)
CREAT SERPL-MCNC: 0.92 MG/DL (ref 0.6–1.1)
DIFFERENTIAL METHOD BLD: ABNORMAL
EKG ATRIAL RATE: 60 BPM
EKG DIAGNOSIS: NORMAL
EKG P AXIS: -19 DEGREES
EKG P-R INTERVAL: 210 MS
EKG Q-T INTERVAL: 392 MS
EKG QRS DURATION: 82 MS
EKG QTC CALCULATION (BAZETT): 392 MS
EKG R AXIS: -43 DEGREES
EKG T AXIS: 41 DEGREES
EKG VENTRICULAR RATE: 60 BPM
EOSINOPHIL # BLD: 0.08 K/UL (ref 0–0.8)
EOSINOPHIL NFR BLD: 0.5 % (ref 0.5–7.8)
ERYTHROCYTE [DISTWIDTH] IN BLOOD BY AUTOMATED COUNT: 16.8 % (ref 11.9–14.6)
EST. AVERAGE GLUCOSE BLD GHB EST-MCNC: 113 MG/DL
GASTROINTESTINAL PATHOGEN PANEL: NORMAL
GLUCOSE SERPL-MCNC: 101 MG/DL (ref 70–99)
HBA1C MFR BLD: 5.6 % (ref 0–5.6)
HCT VFR BLD AUTO: 34.4 % (ref 35.8–46.3)
HDLC SERPL-MCNC: 50 MG/DL (ref 40–60)
HDLC SERPL: 2.5 (ref 0–5)
HGB BLD-MCNC: 11.1 G/DL (ref 11.7–15.4)
IMM GRANULOCYTES # BLD AUTO: 0.17 K/UL (ref 0–0.5)
IMM GRANULOCYTES NFR BLD AUTO: 1 % (ref 0–5)
LDLC SERPL CALC-MCNC: 47 MG/DL (ref 0–100)
LYMPHOCYTES # BLD: 4.74 K/UL (ref 0.5–4.6)
LYMPHOCYTES NFR BLD: 28.2 % (ref 13–44)
MCH RBC QN AUTO: 33.1 PG (ref 26.1–32.9)
MCHC RBC AUTO-ENTMCNC: 32.3 G/DL (ref 31.4–35)
MCV RBC AUTO: 102.7 FL (ref 82–102)
MONOCYTES # BLD: 1.81 K/UL (ref 0.1–1.3)
MONOCYTES NFR BLD: 10.8 % (ref 4–12)
NEUTS SEG # BLD: 9.93 K/UL (ref 1.7–8.2)
NEUTS SEG NFR BLD: 59.1 % (ref 43–78)
NRBC # BLD: 0.43 K/UL (ref 0–0.2)
PLATELET # BLD AUTO: 115 K/UL (ref 150–450)
PLATELET COMMENT: SLIGHT
PMV BLD AUTO: 12.4 FL (ref 9.4–12.3)
POTASSIUM SERPL-SCNC: 4.3 MMOL/L (ref 3.5–5.1)
RBC # BLD AUTO: 3.35 M/UL (ref 4.05–5.2)
RBC MORPH BLD: ABNORMAL
RBC MORPH BLD: ABNORMAL
SODIUM SERPL-SCNC: 131 MMOL/L (ref 136–145)
TRIGL SERPL-MCNC: 133 MG/DL (ref 0–150)
VLDLC SERPL CALC-MCNC: 27 MG/DL (ref 6–23)
WBC # BLD AUTO: 16.8 K/UL (ref 4.3–11.1)
WBC MORPH BLD: ABNORMAL

## 2025-02-06 PROCEDURE — 97168 OT RE-EVAL EST PLAN CARE: CPT

## 2025-02-06 PROCEDURE — 2500000003 HC RX 250 WO HCPCS: Performed by: INTERNAL MEDICINE

## 2025-02-06 PROCEDURE — 70551 MRI BRAIN STEM W/O DYE: CPT

## 2025-02-06 PROCEDURE — 80061 LIPID PANEL: CPT

## 2025-02-06 PROCEDURE — 2580000003 HC RX 258: Performed by: INTERNAL MEDICINE

## 2025-02-06 PROCEDURE — 92523 SPEECH SOUND LANG COMPREHEN: CPT

## 2025-02-06 PROCEDURE — 97112 NEUROMUSCULAR REEDUCATION: CPT

## 2025-02-06 PROCEDURE — 36415 COLL VENOUS BLD VENIPUNCTURE: CPT

## 2025-02-06 PROCEDURE — 93005 ELECTROCARDIOGRAM TRACING: CPT | Performed by: INTERNAL MEDICINE

## 2025-02-06 PROCEDURE — 93010 ELECTROCARDIOGRAM REPORT: CPT | Performed by: INTERNAL MEDICINE

## 2025-02-06 PROCEDURE — 6360000002 HC RX W HCPCS: Performed by: INTERNAL MEDICINE

## 2025-02-06 PROCEDURE — 92610 EVALUATE SWALLOWING FUNCTION: CPT

## 2025-02-06 PROCEDURE — 85025 COMPLETE CBC W/AUTO DIFF WBC: CPT

## 2025-02-06 PROCEDURE — 1100000003 HC PRIVATE W/ TELEMETRY

## 2025-02-06 PROCEDURE — 2500000003 HC RX 250 WO HCPCS: Performed by: STUDENT IN AN ORGANIZED HEALTH CARE EDUCATION/TRAINING PROGRAM

## 2025-02-06 PROCEDURE — 99232 SBSQ HOSP IP/OBS MODERATE 35: CPT | Performed by: NURSE PRACTITIONER

## 2025-02-06 PROCEDURE — 83036 HEMOGLOBIN GLYCOSYLATED A1C: CPT

## 2025-02-06 PROCEDURE — 97530 THERAPEUTIC ACTIVITIES: CPT

## 2025-02-06 PROCEDURE — 97535 SELF CARE MNGMENT TRAINING: CPT

## 2025-02-06 PROCEDURE — 80048 BASIC METABOLIC PNL TOTAL CA: CPT

## 2025-02-06 PROCEDURE — 97164 PT RE-EVAL EST PLAN CARE: CPT

## 2025-02-06 PROCEDURE — 6370000000 HC RX 637 (ALT 250 FOR IP): Performed by: INTERNAL MEDICINE

## 2025-02-06 PROCEDURE — 6370000000 HC RX 637 (ALT 250 FOR IP): Performed by: STUDENT IN AN ORGANIZED HEALTH CARE EDUCATION/TRAINING PROGRAM

## 2025-02-06 RX ORDER — FLUCONAZOLE 100 MG/1
200 TABLET ORAL DAILY
Status: COMPLETED | OUTPATIENT
Start: 2025-02-07 | End: 2025-02-09

## 2025-02-06 RX ORDER — PANTOPRAZOLE SODIUM 40 MG/1
40 TABLET, DELAYED RELEASE ORAL
Status: DISCONTINUED | OUTPATIENT
Start: 2025-02-07 | End: 2025-02-09 | Stop reason: HOSPADM

## 2025-02-06 RX ORDER — METOPROLOL TARTRATE 25 MG/1
25 TABLET, FILM COATED ORAL 2 TIMES DAILY
Status: DISCONTINUED | OUTPATIENT
Start: 2025-02-06 | End: 2025-02-09 | Stop reason: HOSPADM

## 2025-02-06 RX ADMIN — METRONIDAZOLE 500 MG: 500 INJECTION, SOLUTION INTRAVENOUS at 15:26

## 2025-02-06 RX ADMIN — SODIUM CHLORIDE, PRESERVATIVE FREE 10 ML: 5 INJECTION INTRAVENOUS at 19:54

## 2025-02-06 RX ADMIN — SODIUM CHLORIDE, PRESERVATIVE FREE 10 ML: 5 INJECTION INTRAVENOUS at 09:55

## 2025-02-06 RX ADMIN — SODIUM CHLORIDE 40 MG: 9 INJECTION INTRAMUSCULAR; INTRAVENOUS; SUBCUTANEOUS at 09:54

## 2025-02-06 RX ADMIN — ASPIRIN 81 MG: 81 TABLET, CHEWABLE ORAL at 09:55

## 2025-02-06 RX ADMIN — FLUCONAZOLE 200 MG: 200 INJECTION, SOLUTION INTRAVENOUS at 10:17

## 2025-02-06 RX ADMIN — ATORVASTATIN CALCIUM 40 MG: 40 TABLET, FILM COATED ORAL at 19:51

## 2025-02-06 RX ADMIN — ACETAMINOPHEN 650 MG: 325 TABLET ORAL at 18:08

## 2025-02-06 RX ADMIN — METOPROLOL TARTRATE 25 MG: 25 TABLET, FILM COATED ORAL at 15:18

## 2025-02-06 RX ADMIN — WATER 2000 MG: 1 INJECTION INTRAMUSCULAR; INTRAVENOUS; SUBCUTANEOUS at 17:45

## 2025-02-06 RX ADMIN — METRONIDAZOLE 500 MG: 500 INJECTION, SOLUTION INTRAVENOUS at 01:23

## 2025-02-06 RX ADMIN — WATER 2000 MG: 1 INJECTION INTRAMUSCULAR; INTRAVENOUS; SUBCUTANEOUS at 01:15

## 2025-02-06 RX ADMIN — METOPROLOL TARTRATE 25 MG: 25 TABLET, FILM COATED ORAL at 19:51

## 2025-02-06 RX ADMIN — WATER 2000 MG: 1 INJECTION INTRAMUSCULAR; INTRAVENOUS; SUBCUTANEOUS at 09:55

## 2025-02-06 ASSESSMENT — PAIN DESCRIPTION - LOCATION
LOCATION: LEG
LOCATION: FOOT;LEG
LOCATION: LEG

## 2025-02-06 ASSESSMENT — PAIN DESCRIPTION - ORIENTATION
ORIENTATION: LEFT

## 2025-02-06 ASSESSMENT — PAIN SCALES - GENERAL
PAINLEVEL_OUTOF10: 0
PAINLEVEL_OUTOF10: 9
PAINLEVEL_OUTOF10: 4

## 2025-02-06 ASSESSMENT — PAIN - FUNCTIONAL ASSESSMENT
PAIN_FUNCTIONAL_ASSESSMENT: ACTIVITIES ARE NOT PREVENTED
PAIN_FUNCTIONAL_ASSESSMENT: ACTIVITIES ARE NOT PREVENTED

## 2025-02-06 ASSESSMENT — PAIN DESCRIPTION - FREQUENCY: FREQUENCY: INTERMITTENT

## 2025-02-06 ASSESSMENT — PAIN DESCRIPTION - DESCRIPTORS
DESCRIPTORS: DISCOMFORT
DESCRIPTORS: ACHING

## 2025-02-06 ASSESSMENT — PAIN DESCRIPTION - PAIN TYPE: TYPE: ACUTE PAIN

## 2025-02-06 NOTE — PLAN OF CARE
Problem: Discharge Planning  Goal: Discharge to home or other facility with appropriate resources  2/6/2025 0745 by Nahomi Mason, RN  Outcome: Progressing  2/6/2025 0725 by Nahomi Mason RN  Outcome: Progressing     Problem: Pain  Goal: Verbalizes/displays adequate comfort level or baseline comfort level  2/6/2025 0745 by Nahomi Mason, RN  Outcome: Progressing  2/6/2025 0725 by Nahomi Mason, RN  Outcome: Progressing

## 2025-02-06 NOTE — ICUWATCH
RRT Clinical Rounding Nurse Update    Vitals:    02/05/25 1922 02/05/25 2320 02/06/25 0340 02/06/25 0804   BP: (!) 160/67 120/63 126/62 (!) 142/67   Pulse: 66 69 69 70   Resp: 19 19 16 17   Temp: 97.9 °F (36.6 °C) 98.1 °F (36.7 °C) 97.5 °F (36.4 °C) 97.9 °F (36.6 °C)   TempSrc: Oral Oral Oral Axillary   SpO2: 99% 98% 95% 96%   Weight:       Height:            DETERIORATION INDEX SCORE: 39    ASSESSMENT:  Previous outreach assessment was reviewed. There have been no significant changes since previous assessment. Patient awake, alert able to ambulate to the recliner with PT/OT. NIH 0. On 3L NC. Complaining of Left leg pain.    PLAN:  Will discharge from RRT Clinical Rounding Program per protocol. Please call if needed. Let rover know if you need any further assistance.    Curry Urbina RN  [facility]: [phone]  [facility]: [phone]

## 2025-02-06 NOTE — FLOWSHEET NOTE
02/05/25 1914   NIH Stroke Scale   NIH Stroke Scale Assessed Yes   Interval Hand-off/Transfer   Level of Consciousness (1a) 0   LOC Questions (1b) 0   LOC Commands (1c) 0   Best Gaze (2) 0   Visual (3) 0   Facial Palsy (4) 0   Motor Arm, Left (5a) 0   Motor Arm, Right (5b) 0   Motor Leg, Left (6a) 0   Motor Leg, Right (6b) 0   Limb Ataxia (7) 0   Sensory (8) 0   Best Language (9) 0   Dysarthria (10) 0   Extinction and Inattention (11) 0   Total 0

## 2025-02-06 NOTE — ICUWATCH
RRT Clinical Rounding Nurse Progress Report      SUBJECTIVE: Patient assessed secondary to recent rapid response.     Vitals:    02/05/25 1704 02/05/25 1744 02/05/25 1922 02/05/25 2320   BP: (!) 180/78 (!) 158/71 (!) 160/67 120/63   Pulse: 60 64 66 69   Resp:   19 19   Temp:   97.9 °F (36.6 °C) 98.1 °F (36.7 °C)   TempSrc:   Oral Oral   SpO2:  99% 99% 98%   Weight:       Height:            DETERIORATION INDEX SCORE: 41    ASSESSMENT:  Pt resting in bed at this time. Primary RN at bedside at this time. NIH remains a 0. Respirations are even and unlabored on 3L NC. Pt denies any pain or SOB. No acute distress noted, VSS..     PLAN:  Will follow per RRT Clinical Rounding Program protocol.    Adelaida Moscoso RN  [facility]: [phone]  [facility]: [phone]

## 2025-02-06 NOTE — THERAPY EVALUATION
ACUTE OCCUPATIONAL THERAPY GOALS:   (Developed with and agreed upon by patient and/or caregiver.)  1) Patient will complete lower body bathing and dressing with INDEPENDENCE and adaptive equipment as needed.   2) Patient will complete toileting with INDEPENDENCE.   3) Patient will complete functional transfers with INDEPENDENCE and adaptive equipment as needed.   4) Patient will tolerate at least 25 minutes of OT activity with 1-2 rest breaks while maintaining O2 sats >90%.   5) Patient will verbalize at least 3 energy conservation technique to utilize during ADL/IADL.   Timeframe: 7 visits      OCCUPATIONAL THERAPY Initial Assessment and Daily Note       OT Visit Days: 1  Acknowledge Orders  Time  OT Charge Capture  Rehab Caseload Tracker      Jacklyn Castillo is a 89 y.o. female   PRIMARY DIAGNOSIS: Acute cholecystitis  Diverticulitis [K57.92]  Procedure(s) (LRB):  ENDOSCOPIC ULTRASOUND (N/A)  ENDOSCOPIC RETROGRADE CHOLANGIOPANCREATOGRAPHY (N/A)     Reason for Referral: Generalized Muscle Weakness (M62.81)  Inpatient: Payor: MEDICARE / Plan: MEDICARE PART A AND B / Product Type: *No Product type* /     ASSESSMENT:     REHAB RECOMMENDATIONS:   Recommendation to date pending progress:  Setting:  Home Health Therapy    Equipment:    To Be Determined     ASSESSMENT:  Ms. Castillo is a 89 y/o female who presents with c/o n/v/d; admitted with diverticulitis. 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 Pt also has supportive children who live close by.    This date, pt presented supine in ER and agreeable to session. Pt overall CGA for functional transfers and mobility of household distance with HHA. Pt also completed toileting and grooming tasks with assist. Hospitalist arrived during session to go over some things with the patient, pt sat edge of bed throughout this while therapist charted (see in and out times below). 
[] [] [] [] [] [] [] [] [] []    Functional Mobility [] [] [] [] [] [x] [] [] [] [] Bed > toilet > edge of sink > chair via RW   I=Independent, Mod I=Modified Independent, S=Supervision/Setup, SBA=Standby Assistance, CGA=Contact Guard Assistance, Min=Minimal Assistance, Mod=Moderate Assistance, Max=Maximal Assistance, Total=Total Assistance, NT=Not Tested    PLAN:   FREQUENCY/DURATION     for duration of hospital stay or until stated goals are met, whichever comes first.    PROBLEM LIST:   (Skilled intervention is medically necessary to address:)  Decreased ADL/Functional Activities  Decreased Activity Tolerance  Decreased AROM/PROM  Decreased Balance  Decreased Gait Ability  Decreased Strength  Decreased Transfer Abilities  Increased Pain   INTERVENTIONS PLANNED:  (Benefits and precautions of occupational therapy have been discussed with the patient.)  Self Care Training  Therapeutic Activity  Therapeutic Exercise/HEP  Neuromuscular Re-education  Manual Therapy  Education         TREATMENT:     EVALUATION: RE EVALUATION: (Untimed Charge)  The initial evaluation charge encompasses clinical chart review, objective assessment, interpretation of assessment, and skilled monitoring of the patient's response to treatment in order to develop a plan of care.     TREATMENT:   Neuromuscular Re-education (15 Minutes): Patient participated in neuromuscular re-education including functional reaching, weight shifting, postural training, midline training, standing tolerance activity , and sitting balance activity   with minimal assistance, verbal cues, tactile cues, and education to improve sitting balance, standing balance, static balance, and dynamic balance in order to prepare for functional task, prepare for seated ADLs, prepare for standing ADLs, prepare for functional transfer, increase safety awareness, and prepare for self care..   Self Care (15 minutes): Patient participated in toileting, grooming, functional mobility,

## 2025-02-07 DIAGNOSIS — C91.10 CHRONIC LYMPHOCYTIC LEUKEMIA (HCC): Primary | ICD-10-CM

## 2025-02-07 PROBLEM — C95.10 CHRONIC LEUKEMIA (HCC): Status: ACTIVE | Noted: 2025-02-07

## 2025-02-07 LAB
ANION GAP SERPL CALC-SCNC: 12 MMOL/L (ref 7–16)
BASOPHILS # BLD: 0.06 K/UL (ref 0–0.2)
BASOPHILS NFR BLD: 0.3 % (ref 0–2)
BUN SERPL-MCNC: 21 MG/DL (ref 8–23)
CALCIUM SERPL-MCNC: 11.5 MG/DL (ref 8.8–10.2)
CHLORIDE SERPL-SCNC: 94 MMOL/L (ref 98–107)
CO2 SERPL-SCNC: 25 MMOL/L (ref 20–29)
CREAT SERPL-MCNC: 0.75 MG/DL (ref 0.6–1.1)
DIFFERENTIAL METHOD BLD: ABNORMAL
EOSINOPHIL # BLD: 0.18 K/UL (ref 0–0.8)
EOSINOPHIL NFR BLD: 1 % (ref 0.5–7.8)
ERYTHROCYTE [DISTWIDTH] IN BLOOD BY AUTOMATED COUNT: 16.4 % (ref 11.9–14.6)
GLUCOSE SERPL-MCNC: 119 MG/DL (ref 70–99)
HCT VFR BLD AUTO: 35.2 % (ref 35.8–46.3)
HGB BLD-MCNC: 11.7 G/DL (ref 11.7–15.4)
IMM GRANULOCYTES # BLD AUTO: 0.13 K/UL (ref 0–0.5)
IMM GRANULOCYTES NFR BLD AUTO: 0.7 % (ref 0–5)
LYMPHOCYTES # BLD: 5.82 K/UL (ref 0.5–4.6)
LYMPHOCYTES NFR BLD: 31.8 % (ref 13–44)
MCH RBC QN AUTO: 32.5 PG (ref 26.1–32.9)
MCHC RBC AUTO-ENTMCNC: 33.2 G/DL (ref 31.4–35)
MCV RBC AUTO: 97.8 FL (ref 82–102)
MONOCYTES # BLD: 1.85 K/UL (ref 0.1–1.3)
MONOCYTES NFR BLD: 10.1 % (ref 4–12)
NEUTS SEG # BLD: 10.27 K/UL (ref 1.7–8.2)
NEUTS SEG NFR BLD: 56.1 % (ref 43–78)
NRBC # BLD: 0.37 K/UL (ref 0–0.2)
PLATELET # BLD AUTO: 110 K/UL (ref 150–450)
PLATELET COMMENT: ABNORMAL
PMV BLD AUTO: 12.8 FL (ref 9.4–12.3)
POTASSIUM SERPL-SCNC: 4.1 MMOL/L (ref 3.5–5.1)
RBC # BLD AUTO: 3.6 M/UL (ref 4.05–5.2)
RBC MORPH BLD: ABNORMAL
SODIUM SERPL-SCNC: 131 MMOL/L (ref 136–145)
WBC # BLD AUTO: 18.3 K/UL (ref 4.3–11.1)
WBC MORPH BLD: ABNORMAL

## 2025-02-07 PROCEDURE — 36415 COLL VENOUS BLD VENIPUNCTURE: CPT

## 2025-02-07 PROCEDURE — 97116 GAIT TRAINING THERAPY: CPT

## 2025-02-07 PROCEDURE — 6360000002 HC RX W HCPCS: Performed by: INTERNAL MEDICINE

## 2025-02-07 PROCEDURE — 99222 1ST HOSP IP/OBS MODERATE 55: CPT | Performed by: STUDENT IN AN ORGANIZED HEALTH CARE EDUCATION/TRAINING PROGRAM

## 2025-02-07 PROCEDURE — 6370000000 HC RX 637 (ALT 250 FOR IP): Performed by: INTERNAL MEDICINE

## 2025-02-07 PROCEDURE — 2500000003 HC RX 250 WO HCPCS: Performed by: INTERNAL MEDICINE

## 2025-02-07 PROCEDURE — 2500000003 HC RX 250 WO HCPCS: Performed by: STUDENT IN AN ORGANIZED HEALTH CARE EDUCATION/TRAINING PROGRAM

## 2025-02-07 PROCEDURE — 85025 COMPLETE CBC W/AUTO DIFF WBC: CPT

## 2025-02-07 PROCEDURE — 80048 BASIC METABOLIC PNL TOTAL CA: CPT

## 2025-02-07 PROCEDURE — 97530 THERAPEUTIC ACTIVITIES: CPT

## 2025-02-07 PROCEDURE — 1100000000 HC RM PRIVATE

## 2025-02-07 RX ORDER — FUROSEMIDE 20 MG/1
20 TABLET ORAL 2 TIMES DAILY
Status: DISCONTINUED | OUTPATIENT
Start: 2025-02-07 | End: 2025-02-08

## 2025-02-07 RX ORDER — POTASSIUM CHLORIDE 1500 MG/1
20 TABLET, EXTENDED RELEASE ORAL 2 TIMES DAILY WITH MEALS
Status: DISCONTINUED | OUTPATIENT
Start: 2025-02-07 | End: 2025-02-08

## 2025-02-07 RX ADMIN — FLUCONAZOLE 200 MG: 100 TABLET ORAL at 09:07

## 2025-02-07 RX ADMIN — ALLOPURINOL 100 MG: 100 TABLET ORAL at 09:07

## 2025-02-07 RX ADMIN — SODIUM CHLORIDE, PRESERVATIVE FREE 10 ML: 5 INJECTION INTRAVENOUS at 20:56

## 2025-02-07 RX ADMIN — METOPROLOL TARTRATE 25 MG: 25 TABLET, FILM COATED ORAL at 20:55

## 2025-02-07 RX ADMIN — MAGNESIUM GLUCONATE 500 MG ORAL TABLET 400 MG: 500 TABLET ORAL at 09:07

## 2025-02-07 RX ADMIN — FUROSEMIDE 20 MG: 20 TABLET ORAL at 17:21

## 2025-02-07 RX ADMIN — METRONIDAZOLE 500 MG: 500 INJECTION, SOLUTION INTRAVENOUS at 13:08

## 2025-02-07 RX ADMIN — POTASSIUM CHLORIDE 20 MEQ: 1500 TABLET, EXTENDED RELEASE ORAL at 17:21

## 2025-02-07 RX ADMIN — FUROSEMIDE 20 MG: 20 TABLET ORAL at 09:07

## 2025-02-07 RX ADMIN — SODIUM CHLORIDE, PRESERVATIVE FREE 10 ML: 5 INJECTION INTRAVENOUS at 09:09

## 2025-02-07 RX ADMIN — WATER 2000 MG: 1 INJECTION INTRAMUSCULAR; INTRAVENOUS; SUBCUTANEOUS at 09:09

## 2025-02-07 RX ADMIN — WATER 2000 MG: 1 INJECTION INTRAMUSCULAR; INTRAVENOUS; SUBCUTANEOUS at 17:06

## 2025-02-07 RX ADMIN — METRONIDAZOLE 500 MG: 500 INJECTION, SOLUTION INTRAVENOUS at 00:40

## 2025-02-07 RX ADMIN — FAMOTIDINE 20 MG: 20 TABLET, FILM COATED ORAL at 09:07

## 2025-02-07 RX ADMIN — WATER 2000 MG: 1 INJECTION INTRAMUSCULAR; INTRAVENOUS; SUBCUTANEOUS at 00:41

## 2025-02-07 RX ADMIN — METOPROLOL TARTRATE 25 MG: 25 TABLET, FILM COATED ORAL at 09:06

## 2025-02-07 RX ADMIN — POTASSIUM CHLORIDE 20 MEQ: 1500 TABLET, EXTENDED RELEASE ORAL at 09:06

## 2025-02-07 RX ADMIN — PANTOPRAZOLE SODIUM 40 MG: 40 TABLET, DELAYED RELEASE ORAL at 05:29

## 2025-02-07 RX ADMIN — ATORVASTATIN CALCIUM 40 MG: 40 TABLET, FILM COATED ORAL at 20:56

## 2025-02-07 RX ADMIN — ASPIRIN 81 MG: 81 TABLET, CHEWABLE ORAL at 08:58

## 2025-02-07 NOTE — CARE COORDINATION
RNCM: Bed available at [facility] for STR on Sunday 2/9. Patient will go to room 320. Report line: [id number]. CM following.

## 2025-02-07 NOTE — CONSULTS
[facility] Hematology Oncology      Inpatient Hematology / Oncology Consult Note    Reason for Consult:  Cholecystitis [K81.9]  Diverticulitis [K57.92]  Diverticulitis of colon [K57.32]  Common bile duct dilatation [K83.8]  Acute on chronic congestive heart failure, unspecified heart failure type (HCC) [I50.9]  Dilation of common bile duct [K83.8]  Referring Physician:  Ryne Birmingham DO    History of Present Illness:  Ms. Castillo is a 89 y.o. female admitted on 1/28/2025 with a primary diagnosis of The primary encounter diagnosis was Diverticulitis of colon. Diagnoses of Cholecystitis, Dilation of common bile duct, Acute on chronic congestive heart failure, unspecified heart failure type (HCC), Common bile duct dilatation, and Bilateral pleural effusion were also pertinent to this visit..      Jacklyn Castillo is a pleasant 89 y.o. female with medical history of colon cancer, hypertension who presented /28 with nausea, vomiting and diarrhea. UA was positive for nitrites, trace leukocyte esterase with 4+ bacteria. CT abdomen showed gallbladder distention and mild intrahepatic biliary ductal dilatation, moderate bilateral pleural effusions, possible mild diverticulitis. Ultrasound showed nonspecific moderate intrahepatic biliary ductal dilation with the CBD at 8mm as well as gallbladder sludge with distention and wall thickening. TB/LFT's normal. HIDA showed GB dyskinesia without duct obstruction. Patient underwent ERCP and EUS which showed a gallbladder with sludge. The CBD was 9mm, no stone noted. The gallbladder was drained. CT chest with pleural effusions, responding to lasix. Labs revealed ongoing leukocytosis and thrombocytopenia. She has been followed at [facility] for chronic lymphocytic leukemia but her oncologist retired and he sent her back to her PCP for monitoring - not seen there since August of 2022. Hgb 11.7, platelets 110,000, WBC 18.3 - more neutrophil dominant than lymphocyte.

## 2025-02-08 LAB
ANION GAP SERPL CALC-SCNC: 9 MMOL/L (ref 7–16)
BASOPHILS # BLD: 0.07 K/UL (ref 0–0.2)
BASOPHILS NFR BLD: 0.4 % (ref 0–2)
BUN SERPL-MCNC: 21 MG/DL (ref 8–23)
CALCIUM SERPL-MCNC: 11.5 MG/DL (ref 8.8–10.2)
CHLORIDE SERPL-SCNC: 94 MMOL/L (ref 98–107)
CO2 SERPL-SCNC: 25 MMOL/L (ref 20–29)
CREAT SERPL-MCNC: 0.75 MG/DL (ref 0.6–1.1)
DIFFERENTIAL METHOD BLD: ABNORMAL
EOSINOPHIL # BLD: 0.12 K/UL (ref 0–0.8)
EOSINOPHIL NFR BLD: 0.7 % (ref 0.5–7.8)
ERYTHROCYTE [DISTWIDTH] IN BLOOD BY AUTOMATED COUNT: 16.4 % (ref 11.9–14.6)
GLUCOSE SERPL-MCNC: 125 MG/DL (ref 70–99)
HCT VFR BLD AUTO: 36.7 % (ref 35.8–46.3)
HGB BLD-MCNC: 11.7 G/DL (ref 11.7–15.4)
IMM GRANULOCYTES # BLD AUTO: 0.17 K/UL (ref 0–0.5)
IMM GRANULOCYTES NFR BLD AUTO: 1 % (ref 0–5)
LYMPHOCYTES # BLD: 6.18 K/UL (ref 0.5–4.6)
LYMPHOCYTES NFR BLD: 34.7 % (ref 13–44)
MCH RBC QN AUTO: 32.2 PG (ref 26.1–32.9)
MCHC RBC AUTO-ENTMCNC: 31.9 G/DL (ref 31.4–35)
MCV RBC AUTO: 101.1 FL (ref 82–102)
MONOCYTES # BLD: 1.65 K/UL (ref 0.1–1.3)
MONOCYTES NFR BLD: 9.3 % (ref 4–12)
NEUTS SEG # BLD: 9.62 K/UL (ref 1.7–8.2)
NEUTS SEG NFR BLD: 53.9 % (ref 43–78)
NRBC # BLD: 0.17 K/UL (ref 0–0.2)
PLATELET # BLD AUTO: 103 K/UL (ref 150–450)
PMV BLD AUTO: 13.3 FL (ref 9.4–12.3)
POTASSIUM SERPL-SCNC: 4.8 MMOL/L (ref 3.5–5.1)
RBC # BLD AUTO: 3.63 M/UL (ref 4.05–5.2)
SODIUM SERPL-SCNC: 128 MMOL/L (ref 136–145)
WBC # BLD AUTO: 17.8 K/UL (ref 4.3–11.1)

## 2025-02-08 PROCEDURE — 6370000000 HC RX 637 (ALT 250 FOR IP): Performed by: INTERNAL MEDICINE

## 2025-02-08 PROCEDURE — 2500000003 HC RX 250 WO HCPCS: Performed by: STUDENT IN AN ORGANIZED HEALTH CARE EDUCATION/TRAINING PROGRAM

## 2025-02-08 PROCEDURE — 85025 COMPLETE CBC W/AUTO DIFF WBC: CPT

## 2025-02-08 PROCEDURE — 1100000000 HC RM PRIVATE

## 2025-02-08 PROCEDURE — 2700000000 HC OXYGEN THERAPY PER DAY

## 2025-02-08 PROCEDURE — 80048 BASIC METABOLIC PNL TOTAL CA: CPT

## 2025-02-08 PROCEDURE — 36415 COLL VENOUS BLD VENIPUNCTURE: CPT

## 2025-02-08 PROCEDURE — 94760 N-INVAS EAR/PLS OXIMETRY 1: CPT

## 2025-02-08 RX ORDER — FUROSEMIDE 20 MG/1
20 TABLET ORAL DAILY
Status: DISCONTINUED | OUTPATIENT
Start: 2025-02-09 | End: 2025-02-09 | Stop reason: HOSPADM

## 2025-02-08 RX ORDER — POTASSIUM CHLORIDE 1500 MG/1
20 TABLET, EXTENDED RELEASE ORAL
Status: DISCONTINUED | OUTPATIENT
Start: 2025-02-09 | End: 2025-02-09 | Stop reason: HOSPADM

## 2025-02-08 RX ADMIN — POTASSIUM CHLORIDE 20 MEQ: 1500 TABLET, EXTENDED RELEASE ORAL at 09:00

## 2025-02-08 RX ADMIN — MAGNESIUM GLUCONATE 500 MG ORAL TABLET 400 MG: 500 TABLET ORAL at 08:58

## 2025-02-08 RX ADMIN — ASPIRIN 81 MG: 81 TABLET, CHEWABLE ORAL at 08:59

## 2025-02-08 RX ADMIN — SODIUM CHLORIDE, PRESERVATIVE FREE 10 ML: 5 INJECTION INTRAVENOUS at 21:08

## 2025-02-08 RX ADMIN — METOPROLOL TARTRATE 25 MG: 25 TABLET, FILM COATED ORAL at 08:57

## 2025-02-08 RX ADMIN — ATORVASTATIN CALCIUM 40 MG: 40 TABLET, FILM COATED ORAL at 21:07

## 2025-02-08 RX ADMIN — FAMOTIDINE 20 MG: 20 TABLET, FILM COATED ORAL at 09:00

## 2025-02-08 RX ADMIN — ALLOPURINOL 100 MG: 100 TABLET ORAL at 08:56

## 2025-02-08 RX ADMIN — FUROSEMIDE 20 MG: 20 TABLET ORAL at 08:59

## 2025-02-08 RX ADMIN — FLUCONAZOLE 200 MG: 100 TABLET ORAL at 08:58

## 2025-02-08 RX ADMIN — METOPROLOL TARTRATE 25 MG: 25 TABLET, FILM COATED ORAL at 21:07

## 2025-02-08 RX ADMIN — PANTOPRAZOLE SODIUM 40 MG: 40 TABLET, DELAYED RELEASE ORAL at 04:54

## 2025-02-08 NOTE — PLAN OF CARE
Problem: Discharge Planning  Goal: Discharge to home or other facility with appropriate resources  2/8/2025 0038 by Rhonda Mcqueen RN  Outcome: Progressing  2/7/2025 1952 by Estephanie Wilson RN  Outcome: Progressing     Problem: Pain  Goal: Verbalizes/displays adequate comfort level or baseline comfort level  2/8/2025 0038 by Rhonda Mcqueen RN  Outcome: Progressing  2/7/2025 1952 by Estephanie Wilson RN  Outcome: Progressing     Problem: Safety - Adult  Goal: Free from fall injury  2/8/2025 0038 by Rhonda Mcqueen RN  Outcome: Progressing  2/7/2025 1952 by Estephanie Wilson RN  Outcome: Progressing     Problem: Skin/Tissue Integrity  Goal: Skin integrity remains intact  Description: 1.  Monitor for areas of redness and/or skin breakdown  2.  Assess vascular access sites hourly  3.  Every 4-6 hours minimum:  Change oxygen saturation probe site  4.  Every 4-6 hours:  If on nasal continuous positive airway pressure, respiratory therapy assess nares and determine need for appliance change or resting period  2/8/2025 0038 by Rhonda Mcqueen RN  Outcome: Progressing  2/7/2025 1952 by Estephanie Wilson RN  Outcome: Progressing     Problem: ABCDS Injury Assessment  Goal: Absence of physical injury  2/8/2025 0038 by Rhonda Mcuqeen RN  Outcome: Progressing  2/7/2025 1952 by Estephanie Wilson RN  Outcome: Progressing     Problem: Respiratory - Adult  Goal: Achieves optimal ventilation and oxygenation  2/8/2025 0038 by Rhonda Mcqueen RN  Outcome: Progressing  2/7/2025 1952 by Estephanie Wilson RN  Outcome: Progressing     Problem: Cardiovascular - Adult  Goal: Maintains optimal cardiac output and hemodynamic stability  2/8/2025 0038 by Rhonda Mcqueen RN  Outcome: Progressing  2/7/2025 1952 by Estephanie Wilson RN  Outcome: Progressing     Problem: Gastrointestinal - Adult  Goal: Minimal or absence of nausea and vomiting  2/8/2025 0038 by Rhonda Mcqueen RN  Outcome: Progressing  2/7/2025 1952 by Estephanie Wilson RN  Outcome: Progressing  Goal: Maintains or

## 2025-02-09 VITALS
WEIGHT: 152 LBS | RESPIRATION RATE: 18 BRPM | TEMPERATURE: 97.9 F | HEIGHT: 61 IN | DIASTOLIC BLOOD PRESSURE: 61 MMHG | SYSTOLIC BLOOD PRESSURE: 136 MMHG | BODY MASS INDEX: 28.7 KG/M2 | OXYGEN SATURATION: 94 % | HEART RATE: 70 BPM

## 2025-02-09 PROCEDURE — 2700000000 HC OXYGEN THERAPY PER DAY

## 2025-02-09 PROCEDURE — 2500000003 HC RX 250 WO HCPCS: Performed by: STUDENT IN AN ORGANIZED HEALTH CARE EDUCATION/TRAINING PROGRAM

## 2025-02-09 PROCEDURE — 6370000000 HC RX 637 (ALT 250 FOR IP): Performed by: INTERNAL MEDICINE

## 2025-02-09 PROCEDURE — 6370000000 HC RX 637 (ALT 250 FOR IP): Performed by: PHYSICIAN ASSISTANT

## 2025-02-09 RX ORDER — POTASSIUM CHLORIDE 1500 MG/1
20 TABLET, EXTENDED RELEASE ORAL
Qty: 60 TABLET | Refills: 0 | Status: SHIPPED | OUTPATIENT
Start: 2025-02-10

## 2025-02-09 RX ORDER — PANTOPRAZOLE SODIUM 40 MG/1
40 TABLET, DELAYED RELEASE ORAL
Qty: 30 TABLET | Refills: 1 | Status: SHIPPED | OUTPATIENT
Start: 2025-02-10

## 2025-02-09 RX ORDER — METOPROLOL TARTRATE 25 MG/1
25 TABLET, FILM COATED ORAL 2 TIMES DAILY
Qty: 60 TABLET | Refills: 1 | Status: SHIPPED | OUTPATIENT
Start: 2025-02-09

## 2025-02-09 RX ORDER — LOPERAMIDE HYDROCHLORIDE 2 MG/1
2 CAPSULE ORAL 3 TIMES DAILY PRN
Qty: 90 CAPSULE | Refills: 0 | Status: SHIPPED | OUTPATIENT
Start: 2025-02-09 | End: 2025-03-11

## 2025-02-09 RX ORDER — FAMOTIDINE 20 MG/1
20 TABLET, FILM COATED ORAL DAILY
Qty: 60 TABLET | Refills: 1 | Status: SHIPPED | OUTPATIENT
Start: 2025-02-10

## 2025-02-09 RX ORDER — FUROSEMIDE 20 MG/1
20 TABLET ORAL DAILY
Qty: 60 TABLET | Refills: 1 | Status: SHIPPED | OUTPATIENT
Start: 2025-02-10

## 2025-02-09 RX ORDER — ATORVASTATIN CALCIUM 40 MG/1
40 TABLET, FILM COATED ORAL NIGHTLY
Qty: 30 TABLET | Refills: 1 | Status: SHIPPED | OUTPATIENT
Start: 2025-02-09

## 2025-02-09 RX ORDER — ASPIRIN 81 MG/1
81 TABLET, CHEWABLE ORAL DAILY
Qty: 30 TABLET | Refills: 1 | Status: SHIPPED | OUTPATIENT
Start: 2025-02-10

## 2025-02-09 RX ORDER — MAGNESIUM HYDROXIDE/ALUMINUM HYDROXICE/SIMETHICONE 120; 1200; 1200 MG/30ML; MG/30ML; MG/30ML
30 SUSPENSION ORAL EVERY 6 HOURS PRN
Qty: 355 ML | Refills: 0 | Status: SHIPPED | OUTPATIENT
Start: 2025-02-09

## 2025-02-09 RX ADMIN — PANTOPRAZOLE SODIUM 40 MG: 40 TABLET, DELAYED RELEASE ORAL at 05:54

## 2025-02-09 RX ADMIN — LOPERAMIDE HYDROCHLORIDE 2 MG: 2 CAPSULE ORAL at 05:54

## 2025-02-09 RX ADMIN — MAGNESIUM GLUCONATE 500 MG ORAL TABLET 400 MG: 500 TABLET ORAL at 08:58

## 2025-02-09 RX ADMIN — ALLOPURINOL 100 MG: 100 TABLET ORAL at 08:57

## 2025-02-09 RX ADMIN — FAMOTIDINE 20 MG: 20 TABLET, FILM COATED ORAL at 08:58

## 2025-02-09 RX ADMIN — FUROSEMIDE 20 MG: 20 TABLET ORAL at 08:58

## 2025-02-09 RX ADMIN — METOPROLOL TARTRATE 25 MG: 25 TABLET, FILM COATED ORAL at 08:58

## 2025-02-09 RX ADMIN — ASPIRIN 81 MG: 81 TABLET, CHEWABLE ORAL at 08:57

## 2025-02-09 RX ADMIN — POTASSIUM CHLORIDE 20 MEQ: 1500 TABLET, EXTENDED RELEASE ORAL at 08:58

## 2025-02-09 RX ADMIN — FLUCONAZOLE 200 MG: 100 TABLET ORAL at 08:58

## 2025-02-09 RX ADMIN — SODIUM CHLORIDE, PRESERVATIVE FREE 10 ML: 5 INJECTION INTRAVENOUS at 09:03

## 2025-02-09 NOTE — PROGRESS NOTES
Hospitalist Progress Note   Admit Date:  2025  7:04 PM   Name:  Jacklyn Castillo   Age:  89 y.o.  Sex:  female  :  10/13/1934   MRN:  [id number]   Room:  [facility]/    Presenting/Chief Complaint: Shortness of Breath     Reason(s) for Admission: Cholecystitis [K81.9]  Diverticulitis [K57.92]  Diverticulitis of colon [K57.32]  Common bile duct dilatation [K83.8]  Acute on chronic congestive heart failure, unspecified heart failure type (HCC) [I50.9]  Dilation of common bile duct [K83.8]     Hospital Course:   Jacklyn Castillo is a pleasant 89 y.o. female with medical history of colon cancer, hypertension who presented /28 with nausea, vomiting and diarrhea. UA was positive for nitrites, trace leukocyte esterase with 4+ bacteria. CT abdomen showed gallbladder distention and mild intrahepatic biliary ductal dilatation, moderate bilateral pleural effusions, possible mild diverticulitis. Ultrasound showed nonspecific moderate intrahepatic biliary ductal dilation with the CBD at 8mm as well as gallbladder sludge with distention and wall thickening. TB/LFT's normal. HIDA showed GB dyskinesia without duct obstruction. Patient underwent ERCP and EUS which showed a gallbladder with sludge. The CBD was 9mm, no stone noted. The gallbladder was drained.     Subjective & 24hr Events:   Feels unwell  Nauseated   Pain in abdomen somewhat improved from prior but now \"different\"  Still with diarrhea  \"it is straight water\"  Prior cdiff was negative  Her lips are so dry   She is still swollen to BL LE and her CT chest shows BL pleural effusions  Denies hx of HF     Assessment & Plan:       Suspected acute cholecystitis    Common bile duct dilatation  - s/p ERCP with EUS and gallbladder drainage    - appreciate general surgery and GI  - no cholecystectomy planned per surgery  - advance diet to GI bland as tolerated       Diverticulitis  - s/p 2 days zosyn, s/p 1 day rocephin and flagyl.  - broaden abx  to 
       Hospitalist Progress Note   Admit Date:  2025  7:04 PM   Name:  Jacklyn Castillo   Age:  89 y.o.  Sex:  female  :  10/13/1934   MRN:  [id number]   Room:  [facility]/    Presenting/Chief Complaint: Shortness of Breath     Reason(s) for Admission: Cholecystitis [K81.9]  Diverticulitis [K57.92]  Diverticulitis of colon [K57.32]  Common bile duct dilatation [K83.8]  Acute on chronic congestive heart failure, unspecified heart failure type (HCC) [I50.9]  Dilation of common bile duct [K83.8]     Hospital Course:     Copied from prior provider HPI/summary:  Jacklyn Castillo is a pleasant 89 y.o. female with medical history of colon cancer, hypertension who presented /28 with nausea, vomiting and diarrhea. UA was positive for nitrites, trace leukocyte esterase with 4+ bacteria. CT abdomen showed gallbladder distention and mild intrahepatic biliary ductal dilatation, moderate bilateral pleural effusions, possible mild diverticulitis. Ultrasound showed nonspecific moderate intrahepatic biliary ductal dilation with the CBD at 8mm as well as gallbladder sludge with distention and wall thickening. TB/LFT's normal. HIDA showed GB dyskinesia without duct obstruction. Patient underwent ERCP and EUS which showed a gallbladder with sludge. The CBD was 9mm, no stone noted. The gallbladder was drained. CT chest with pleural effusions, responding to lasix.      Subjective & 24hr Events:   2/3/25  Last evening and today difficulty with dyspnea/orthopnea.  She has known diastolic dysfunction recent echo and moderate aortic stenosis.  Lasix held secondary to hyponatremia-chest x-ray obtained and pulmonary edema bilateral.  IV twice daily Lasix ordered and she feels better.  Leukocytosis noted without left shift white blood count 15,700 and diarrhea persist.  She has 1 more day of cefepime and metronidazole antibiotic regarding cholecystitis status post bile duct stent placement GI.  Broadly sensitive E. coli UTI should be 
       Hospitalist Progress Note   Admit Date:  2025  7:04 PM   Name:  Jacklyn Castillo   Age:  89 y.o.  Sex:  female  :  10/13/1934   MRN:  [id number]   Room:  [facility]/    Presenting/Chief Complaint: Shortness of Breath     Reason(s) for Admission: Cholecystitis [K81.9]  Diverticulitis [K57.92]  Diverticulitis of colon [K57.32]  Common bile duct dilatation [K83.8]  Acute on chronic congestive heart failure, unspecified heart failure type (HCC) [I50.9]  Dilation of common bile duct [K83.8]     Hospital Course:     Copied from prior provider HPI/summary:  Jacklyn Castillo is a pleasant 89 y.o. female with medical history of colon cancer, hypertension who presented /28 with nausea, vomiting and diarrhea. UA was positive for nitrites, trace leukocyte esterase with 4+ bacteria. CT abdomen showed gallbladder distention and mild intrahepatic biliary ductal dilatation, moderate bilateral pleural effusions, possible mild diverticulitis. Ultrasound showed nonspecific moderate intrahepatic biliary ductal dilation with the CBD at 8mm as well as gallbladder sludge with distention and wall thickening. TB/LFT's normal. HIDA showed GB dyskinesia without duct obstruction. Patient underwent ERCP and EUS which showed a gallbladder with sludge. The CBD was 9mm, no stone noted. The gallbladder was drained. CT chest with pleural effusions, responding to lasix.      Subjective & 24hr Events:     Last evening and today difficulty with dyspnea/orthopnea.  She has known diastolic dysfunction recent echo and moderate aortic stenosis.  Lasix held secondary to hyponatremia-chest x-ray obtained and pulmonary edema bilateral.  IV twice daily Lasix ordered and she feels better.  Leukocytosis noted without left shift white blood count 15,700 and diarrhea persist.  She has 1 more day of cefepime and metronidazole antibiotic regarding cholecystitis status post bile duct stent placement GI.  Broadly sensitive E. coli UTI should be 
       Hospitalist Progress Note   Admit Date:  2025  7:04 PM   Name:  Jacklyn Castillo   Age:  89 y.o.  Sex:  female  :  10/13/1934   MRN:  [id number]   Room:  [facility] [place]/    Presenting/Chief Complaint: Shortness of Breath     Reason(s) for Admission: Cholecystitis [K81.9]  Diverticulitis [K57.92]  Diverticulitis of colon [K57.32]  Common bile duct dilatation [K83.8]  Acute on chronic congestive heart failure, unspecified heart failure type (HCC) [I50.9]  Dilation of common bile duct [K83.8]     Hospital Course:     Copied from prior provider HPI/summary:  Jacklyn Castillo is a pleasant 89 y.o. female with medical history of colon cancer, hypertension who presented /28 with nausea, vomiting and diarrhea. UA was positive for nitrites, trace leukocyte esterase with 4+ bacteria. CT abdomen showed gallbladder distention and mild intrahepatic biliary ductal dilatation, moderate bilateral pleural effusions, possible mild diverticulitis. Ultrasound showed nonspecific moderate intrahepatic biliary ductal dilation with the CBD at 8mm as well as gallbladder sludge with distention and wall thickening. TB/LFT's normal. HIDA showed GB dyskinesia without duct obstruction. Patient underwent ERCP and EUS which showed a gallbladder with sludge. The CBD was 9mm, no stone noted. The gallbladder was drained. CT chest with pleural effusions, responding to lasix.      Subjective & 24hr Events:   2/3/25  Last evening and today difficulty with dyspnea/orthopnea.  She has known diastolic dysfunction recent echo and moderate aortic stenosis.  Lasix held secondary to hyponatremia-chest x-ray obtained and pulmonary edema bilateral.  IV twice daily Lasix ordered and she feels better.  Leukocytosis noted without left shift white blood count 15,700 and diarrhea persist.  She has 1 more day of cefepime and metronidazole antibiotic regarding cholecystitis status post bile duct stent placement GI.  Broadly sensitive E. coli UTI should be 
       Hospitalist Progress Note   Admit Date:  2025  7:04 PM   Name:  Jacklyn Castillo   Age:  89 y.o.  Sex:  female  :  10/13/1934   MRN:  [id number]   Room:  [facility]/    Presenting/Chief Complaint: Shortness of Breath     Reason(s) for Admission: Cholecystitis [K81.9]  Diverticulitis [K57.92]  Diverticulitis of colon [K57.32]  Common bile duct dilatation [K83.8]  Acute on chronic congestive heart failure, unspecified heart failure type (HCC) [I50.9]  Dilation of common bile duct [K83.8]     Hospital Course:     Copied from prior provider HPI/summary:  Jacklyn Castillo is a pleasant 89 y.o. female with medical history of colon cancer, hypertension who presented /28 with nausea, vomiting and diarrhea. UA was positive for nitrites, trace leukocyte esterase with 4+ bacteria. CT abdomen showed gallbladder distention and mild intrahepatic biliary ductal dilatation, moderate bilateral pleural effusions, possible mild diverticulitis. Ultrasound showed nonspecific moderate intrahepatic biliary ductal dilation with the CBD at 8mm as well as gallbladder sludge with distention and wall thickening. TB/LFT's normal. HIDA showed GB dyskinesia without duct obstruction. Patient underwent ERCP and EUS which showed a gallbladder with sludge. The CBD was 9mm, no stone noted. The gallbladder was drained. CT chest with pleural effusions, responding to lasix.      Subjective & 24hr Events:   --patient with episode last evening of transient altered mental status with possible left facial droop-code stroke alerted.  Several hours later patient improved spontaneously.  Reviewed MAR and patient had received tramadol and is age 89 years of age.  This medicine will be discontinued.  Workup negative including CT head, CTA and MRI brain showing microangiopathic changes.  Pleural effusions noted CTA but patient is receiving loop diuretic for known diastolic heart failure acute on chronic and clinically improved.  White blood 
       Hospitalist Progress Note   Admit Date:  2025  7:04 PM   Name:  Jacklyn Castillo   Age:  89 y.o.  Sex:  female  :  10/13/1934   MRN:  [id number]   Room:  [facility]/    Presenting/Chief Complaint: Shortness of Breath     Reason(s) for Admission: Cholecystitis [K81.9]  Diverticulitis [K57.92]  Diverticulitis of colon [K57.32]  Common bile duct dilatation [K83.8]  Acute on chronic congestive heart failure, unspecified heart failure type (HCC) [I50.9]  Dilation of common bile duct [K83.8]     Hospital Course:   Jacklyn Castillo is a 89 y.o. female with medical history of colon cancer, hypertension who presented /28 with nausea, vomiting and diarrhea. UA was positive for nitrites, trace leukocyte esterase with 4+ bacteria. CT abdomen showed gallbladder distention and mild intrahepatic biliary ductal dilatation, moderate bilateral pleural effusions, possible mild diverticulitis. Ultrasound showed nonspecific moderate intrahepatic biliary ductal dilation with the CBD at 8mm as well as gallbladder sludge with distention and wall thickening. TB/LFT's normal. HIDA showed GB dyskinesia without duct obstruction. Plans to undergo EUS and possible ERCP.    Subjective & 24hr Events:   Feels much better   Still some RUQ and RLQ abdominal pain  No overt s/sx of hypercalcemia. Tells me that her supplements have calcium in them.   She tells me that her dyspnea and LE swelling has improved with lasix.   She is stable now on room air    Assessment & Plan:       Suspected acute cholecystitis    Common bile duct dilatation  - appreciate GI for EUS +/- ERCP  - appreciate general surgery for following  - OK to continue IV PPI for now but likely transition to oral post endoscopy   - post endoscopy likely can have a clear liquid diet      Diverticulitis  - s/p 2 days zosyn, continue rocephin and flagyl. Day 3 abx       Nausea vomiting and diarrhea  - resolved. Thought form combo of above  - PRN antiemetics       Pacemaker  - 
    SPEECH LANGUAGE PATHOLOGY: COMBINED Dysphagia and Cognitive Communicaiton Initial Assessment and Discharge    Acknowledge Order  I  Therapy Time  I   Charges     I  Rehab Caseload Tracker    NAME: Jacklyn Castillo  : 10/13/1934  MRN: [id number]    ADMISSION DATE: 2025  PRIMARY DIAGNOSIS: Acute cholecystitis    ICD-10: Treatment Diagnosis:   R13.11 Dysphagia, Oral Phase  R41.841 Cognitive-Communication Deficit    RECOMMENDATIONS   Diet:    Easy to Chew + bread  Thin Liquids    Medication: as tolerated, larger pills whole in applesauce   Compensatory Swallowing Strategies:   Upright as possible for all oral intake   Therapeutic Intervention:   Patient/family education  Dysphagia treatment  Cognitive-linguistic treatment  No additional speech therapy intervention indicated at this time.    Patient continues to require skilled intervention:  No. Please re-consult if new concerns arise.      Anticipated Discharge Needs: No additional speech therapy is indicated.      ASSESSMENT    Dysphagia: Patient presents with functional swallow as able to be determined at bedside characterized by efficient oral prep and transfer, timely swallow initiation, and no overt indications of airway compromise. Patient reports previous diet of regular solids was too dry, therefore RN placed patient on soft and bite sized diet. Patient is able to tolerate easy to chew solids at this time.     Cognitive Communication: Patient is alert and oriented x4, fluent communication with ability to make needs and wants known. Patient exhibits no stuttering, slurring or anomia. Facial movement is symmetrical at rest and with movement. No further speech therapy indicated at this time.     Recommend easy to chew solids + bread, thin liquids, medications as tolerated.     GENERAL    Subjective: Resting in bed, daughter at bedside.     Reason for Consult: CVA work up     History of Present Injury/Illness: Ms. Castillo  has a past medical history 
   02/04/25 1308   Resting (Room Air)   SpO2 94   HR 63   Resting (On O2)   Comments on RA   During Walk (Room Air)   SpO2 90   HR 85   Walk/Assistance Device Walker   Rate of Dyspnea 0   During Walk (On O2)   Comments Patient never required supplemental O2   After Walk   SpO2 92   HR 81   Rate of Dyspnea 0   Does the Patient Qualify for Home O2 No   Does the Patient Need Portable Oxygen Tanks No     Patient completed full oximetry with exercise. Walker required but no supplemental oxygen needed during entire ambulation.  
   Signed         ACUTE OCCUPATIONAL THERAPY GOALS:   (Developed with and agreed upon by patient and/or caregiver.)  1) Patient will complete lower body bathing and dressing with INDEPENDENCE and adaptive equipment as needed.   2) Patient will complete toileting with INDEPENDENCE.   3) Patient will complete functional transfers with INDEPENDENCE and adaptive equipment as needed.   4) Patient will tolerate at least 25 minutes of OT activity with 1-2 rest breaks while maintaining O2 sats >90%.   5) Patient will verbalize at least 3 energy conservation technique to utilize during ADL/IADL.   Timeframe: 7 visits                OCCUPATIONAL THERAPY: Daily Note PM   OT Visit Days: 2   Time In/Out  OT Charge Capture  Rehab Caseload Tracker  OT Orders    Jacklyn Castillo is a 89 y.o. female   PRIMARY DIAGNOSIS: Acute cholecystitis  Cholecystitis [K81.9]  Diverticulitis [K57.92]  Diverticulitis of colon [K57.32]  Common bile duct dilatation [K83.8]  Acute on chronic congestive heart failure, unspecified heart failure type (HCC) [I50.9]  Dilation of common bile duct [K83.8]  Procedure(s) (LRB):  ENDOSCOPIC ULTRASOUND, axios stent placement (N/A)  4 Days Post-Op  Inpatient: Payor: MEDICARE / Plan: MEDICARE PART A AND B / Product Type: *No Product type* /     ASSESSMENT:     REHAB RECOMMENDATIONS:   Recommendation to date pending progress:  Setting:  Home Health Therapy    Equipment:    To Be Determined- possibly oxygen     ASSESSMENT:  Ms. Castillo was received sitting up in chair on RA. Pt required assistance for functional mobility and ADLs today due to oxygen desaturation dropping to 85% on room air (recovered first time after walking in room to 92% with seated rest break and pursed lip breathing, second time to 88% [RN aware]), unsteadiness, fatigue with exertion. Pt is slowly but surely progressing with functional mobility and ADLs. Continue POC.         SUBJECTIVE:     Ms. Castillo states, \"I wasn't too happy with 
   Signed         ACUTE OCCUPATIONAL THERAPY GOALS:   (Developed with and agreed upon by patient and/or caregiver.)  1) Patient will complete lower body bathing and dressing with INDEPENDENCE and adaptive equipment as needed.   2) Patient will complete toileting with INDEPENDENCE.   3) Patient will complete functional transfers with INDEPENDENCE and adaptive equipment as needed.   4) Patient will tolerate at least 25 minutes of OT activity with 1-2 rest breaks while maintaining O2 sats >90%.   5) Patient will verbalize at least 3 energy conservation technique to utilize during ADL/IADL.   Timeframe: 7 visits                OCCUPATIONAL THERAPY: Daily Note PM   OT Visit Days: 3   Time In/Out  OT Charge Capture  Rehab Caseload Tracker  OT Orders    Jacklyn Castillo is a 89 y.o. female   PRIMARY DIAGNOSIS: Acute cholecystitis  Cholecystitis [K81.9]  Diverticulitis [K57.92]  Diverticulitis of colon [K57.32]  Common bile duct dilatation [K83.8]  Acute on chronic congestive heart failure, unspecified heart failure type (HCC) [I50.9]  Dilation of common bile duct [K83.8]  Procedure(s) (LRB):  ENDOSCOPIC ULTRASOUND, axios stent placement (N/A)  5 Days Post-Op  Inpatient: Payor: MEDICARE / Plan: MEDICARE PART A AND B / Product Type: *No Product type* /     ASSESSMENT:     REHAB RECOMMENDATIONS:   Recommendation to date pending progress:  Setting:  Home Health Therapy    Equipment:    To Be Determined- possibly oxygen     ASSESSMENT:  Ms. Castillo presents with decreased activity tolerance, decreased independence with LB dressing, and decreased independence with mobility. Pt completed multiple sit to stand's for LB dressing with min a. Pt walked to the sink with a rolling walker and CGA and participated in grooming and hygiene activities. Pt sat down to rest and RT came into the room to complete a walk test, so OT assisted RT with this. Pt completed HH distances with a rolling walker, additional time, and standing rest 
  Neurology Daily Progress Note     Assessment:     89 year old with abrupt onset of confusion, with possible left facial droop, now resolved. While acute confusional state is not a typical stroke presentation, it is possible in rare circumstances.  Proceed with MRI brain to rule out CVA. Otherwise, the episode is more likely related to encephalopathy in the setting of multiple metabolic disturbances and UTI.     Plan:     MRI brain. If this is negative for infarct, then no further stroke workup is necessary    Correction of underlying metabolic and infectious derangements per primary team.     Bilateral pleural effusions incidentally noted on CTA. Defer to primary team.     Subjective:        Interval history:    Patient is back to neurologic baseline. Family member reports episode lasted about 1-2 hours, then resolved.     History:    Jacklyn Castillo is a 89 y.o. female who is being seen for stroke symptoms.    89-year-old female admitted initially with NVD, found to have urinary tract infection, possible cholecystitis, diverticulitis.  Course complicated by hyponatremia in the setting of frequent diuresis. She is being seen for code S for reportedly abrupt onset confusion, possible left facial droop and left-sided weakness. A code S was called at 4:49 PM. Neurology arrived to the bedside at 4:52 PM. Initial NIHSS was 17.     Review of systems negative with exception of pertinent positives and negatives noted above.       Objective:     Physical Exam:  General - Well developed, well nourished, in no apparent distress. Pleasant and conversant.   HEENT - Normocephalic, atraumatic. Conjunctiva are clear.   Neck - Supple without masses  Extremities - Peripheral pulses intact. No edema and no rashes.     Neurological examination - Comprehension, attention, memory and reasoning are intact. Language and speech are normal.   On cranial nerve examination, (II, III, IV, VI) pupils are equal, round, and reactive to 
  Physician Progress Note      PATIENT:               LINDA ALEMAN  CSN #:                  [id number]  :                       10/13/1934  ADMIT DATE:       2025 7:04 PM  DISCH DATE:  RESPONDING  PROVIDER #:        Marc Pearson MD          QUERY TEXT:    Patient admitted with acute cholecystitis.  ED provider documented acute on chronic congestive heart failure.   Per ED   provider bilateral lower leg edema; Per H/P shortness of breath, especially on   exertion.  Pro-BNP  2693 -> 2866.   Pt treated with IV Lasix x 2 doses ->   oral Lasix daily.   echo: EF 60-65%, abnormal diastolic function    If possible, please document in the progress notes and discharge summary if   CHF was:    The medical record reflects the following:  Risk Factors: advanced age 89 y/o, HTN, HLD, pacemaker  Clinical Indicators: per H/P \"She also endorses shortness of breath especially   on exertion\";  ED provider documented acute on chronic congestive heart   failure.   Per ED provider bilateral lower leg edema, Pro-BNP  2693 -> 2866;    10/19/23 echo EF 60-65%, abnormal diastolic function  Treatment: IV Lasix 20 mg x 2 doses -> oral Lasix 20 mg daily  Options provided:  -- Acute on chronic diastolic CHF present on admission  -- Acute diastolic CHF present on admission  -- Chronic diastolic CHF present on admission, acute CHF ruled out  -- CHF ruled out after study  -- Other - I will add my own diagnosis  -- Disagree - Not applicable / Not valid  -- Disagree - Clinically unable to determine / Unknown  -- Refer to Clinical Documentation Reviewer    PROVIDER RESPONSE TEXT:    Acute on chronic diastolic CHF present on admission    Query created by: Lore Marquez on 2025 9:15 AM      Electronically signed by:  Marc Pearson MD 2025 11:42 AM          
ACUTE PHYSICAL THERAPY GOALS:   (Developed with and agreed upon by patient and/or caregiver.)    Pt will be Mod(I) for all bed mobility with the use of external supports as needed within 7 treatment days.   Pt will be SBAx1 with static seated balance activities without loss of balance and use of external supports as needed within 7 treatment days.   Pt will be SBAx1 with dynamic seated balance activities without loss of balance reaching outside RONNI within 7 treatment days.   Pt will be SBAx1 with STS from all surfaces using LRAD/no device and no loss of balance within 7 treatment days.   Pt will be CGAx1 for all dynamic standing balance activities without loss of balance within 7 treatment days.   Pt will ambulate 150' CGAx1 with LRAD/no device without LOB or miss steps within 7 treatment days.   Pt will maintain SpO2 >95% on RA during prolonged functional mobility without complaints of shortness of breath to improve endurance within 7 treatment days.   Pt will tolerate performing multiple sets/reps of BLE exercises to improve strength, ROM, and endurance within 7 treatment days.   9.   Pt will negotiate 6 steps MinAx1 with external supports PRN without LOB or miss steps within 7 treatment days.    PHYSICAL THERAPY: Daily Note AM   (Link to Caseload Tracking: PT Visit Days : 1  Time In/Out PT Charge Capture  Rehab Caseload Tracker  Orders    Jacklyn Castillo is a 89 y.o. female   PRIMARY DIAGNOSIS: Acute cholecystitis  Cholecystitis [K81.9]  Diverticulitis [K57.92]  Diverticulitis of colon [K57.32]  Common bile duct dilatation [K83.8]  Acute on chronic congestive heart failure, unspecified heart failure type (HCC) [I50.9]  Dilation of common bile duct [K83.8]  Procedure(s) (LRB):  ENDOSCOPIC ULTRASOUND, axios stent placement (N/A)  8 Days Post-Op  Inpatient: Payor: MEDICARE / Plan: MEDICARE PART A AND B / Product Type: *No Product type* /     ASSESSMENT:     REHAB RECOMMENDATIONS:   Recommendation to date 
ACUTE PHYSICAL THERAPY GOALS:   (Developed with and agreed upon by patient and/or caregiver.)    Pt will be Mod(I) for all bed mobility with the use of external supports as needed within 7 treatment days.   Pt will be SBAx1 with static seated balance activities without loss of balance and use of external supports as needed within 7 treatment days.   Pt will be SBAx1 with dynamic seated balance activities without loss of balance reaching outside RONNI within 7 treatment days.   Pt will be SBAx1 with STS from all surfaces using LRAD/no device and no loss of balance within 7 treatment days.   Pt will be CGAx1 for all dynamic standing balance activities without loss of balance within 7 treatment days.   Pt will ambulate 150' CGAx1 with LRAD/no device without LOB or miss steps within 7 treatment days.   Pt will maintain SpO2 >95% on RA during prolonged functional mobility without complaints of shortness of breath to improve endurance within 7 treatment days.   Pt will tolerate performing multiple sets/reps of BLE exercises to improve strength, ROM, and endurance within 7 treatment days.   9.   Pt will negotiate 6 steps MinAx1 with external supports PRN without LOB or miss steps within 7 treatment days.    PHYSICAL THERAPY: Daily Note AM   (Link to Caseload Tracking: PT Visit Days : 2  Time In/Out PT Charge Capture  Rehab Caseload Tracker  Orders    Jacklyn Castillo is a 89 y.o. female   PRIMARY DIAGNOSIS: Acute cholecystitis  Cholecystitis [K81.9]  Diverticulitis [K57.92]  Diverticulitis of colon [K57.32]  Common bile duct dilatation [K83.8]  Acute on chronic congestive heart failure, unspecified heart failure type (HCC) [I50.9]  Dilation of common bile duct [K83.8]  Procedure(s) (LRB):  ENDOSCOPIC ULTRASOUND (N/A)  ENDOSCOPIC RETROGRADE CHOLANGIOPANCREATOGRAPHY (N/A)     Inpatient: Payor: MEDICARE / Plan: MEDICARE PART A AND B / Product Type: *No Product type* /     ASSESSMENT:     REHAB RECOMMENDATIONS: 
ACUTE PHYSICAL THERAPY GOALS:   (Developed with and agreed upon by patient and/or caregiver.)  Goals assessed and revised as needed 2/6/25:  LTG:  (1.)Ms. Castillo will move from supine to sit and sit to supine , scoot up and down, and roll side to side in bed with INDEPENDENT within 7 treatment day(s).    (2.)Ms. Castillo will transfer from bed to chair and chair to bed with MODIFIED INDEPENDENCE using the least restrictive device within 7 treatment day(s).    (3.)Ms. Castillo will ambulate with STAND BY ASSIST for 150+ feet with the least restrictive device within 7 treatment day(s) while maintaining normal vital signs.  (4.)Ms. Castillo will perform HEP B LE independently within 7 treatment days for increased AROM and strength.    (5.)Ms. Castillo will tolerate 23+ minutes of therapeutic activity within 7 treatment days for increased activity tolerance and overall functional mobility.   ________________________________________________________________________________________________       PHYSICAL THERAPY Re-evaluation and AM  (Link to Caseload Tracking: PT Visit Days : 1  Acknowledge Orders  Time In/Out  PT Charge Capture  Rehab Caseload Tracker    Jacklyn Castillo is a 89 y.o. female   PRIMARY DIAGNOSIS: Acute cholecystitis  Cholecystitis [K81.9]  Diverticulitis [K57.92]  Diverticulitis of colon [K57.32]  Common bile duct dilatation [K83.8]  Acute on chronic congestive heart failure, unspecified heart failure type (HCC) [I50.9]  Dilation of common bile duct [K83.8]  Procedure(s) (LRB):  ENDOSCOPIC ULTRASOUND, axios stent placement (N/A)  7 Days Post-Op  Reason for Referral: Generalized Muscle Weakness (M62.81)  Other lack of cordination (R27.8)  Difficulty in walking, Not elsewhere classified (R26.2)  Other abnormalities of gait and mobility (R26.89)  Dizziness and Giddiness (R42)  Inpatient: Payor: MEDICARE / Plan: MEDICARE PART A AND B / Product Type: *No Product type* /     ASSESSMENT:     REHAB 
ACUTE PHYSICAL THERAPY GOALS:   (Developed with and agreed upon by patient and/or caregiver.)  Pt will be Mod(I) for all bed mobility with the use of external supports as needed within 7 treatment days.   Pt will be SBAx1 with static seated balance activities without loss of balance and use of external supports as needed within 7 treatment days.   Pt will be SBAx1 with dynamic seated balance activities without loss of balance reaching outside RONNI within 7 treatment days.   Pt will be SBAx1 with STS from all surfaces using LRAD/no device and no loss of balance within 7 treatment days.   Pt will be CGAx1 for all dynamic standing balance activities without loss of balance within 7 treatment days.   Pt will ambulate 150' CGAx1 with LRAD/no device without LOB or miss steps within 7 treatment days.   Pt will maintain SpO2 >95% on RA during prolonged functional mobility without complaints of shortness of breath to improve endurance within 7 treatment days.   Pt will tolerate performing multiple sets/reps of BLE exercises to improve strength, ROM, and endurance within 7 treatment days.   9.   Pt will negotiate 6 steps MinAx1 with external supports PRN without LOB or miss steps within 7 treatment days.          PHYSICAL THERAPY Initial Assessment, Daily Note, and AM  (Link to Caseload Tracking: PT Visit Days : 1  Acknowledge Orders  Time In/Out  PT Charge Capture  Rehab Caseload Tracker    FALL RISK    Jacklyn Castillo is a 89 y.o. female   PRIMARY DIAGNOSIS: Acute cholecystitis  Diverticulitis [K57.92]  Procedure(s) (LRB):  ENDOSCOPIC ULTRASOUND (N/A)  ENDOSCOPIC RETROGRADE CHOLANGIOPANCREATOGRAPHY (N/A)     Reason for Referral: Generalized Muscle Weakness (M62.81)  Other lack of cordination (R27.8)  Difficulty in walking, Not elsewhere classified (R26.2)  Other abnormalities of gait and mobility (R26.89)  Dizziness and Giddiness (R42)  Inpatient: Payor: MEDICARE / Plan: MEDICARE PART A AND B / Product Type: *No 
Accessed chart for possible admission  
Brief GI Note  Plan for EUS today with gallbladder drainage and evaluation of CBD. If CBD Stone noted, then will proceed with ERCP as well.  
Currently still off unit at ERCP. Denied pain/distress earlier in shift. Assisted to BR by RN, BM and UOP recorded. Mag IV replacement infused prior to leaving for ERCP.   
END OF SHIFT NOTE:    INTAKE/OUTPUT  01/31 0701 - 02/01 0700  In: 342   Out: -   Voiding: yes  Catheter: no  Drain:              Flatus: Patient does have flatus present.    Stool:  4 occurrences.  Watery green soaking aria diaper; unable to send sample.   Characteristics:       Emesis: 0 occurrences.    Characteristics:        VITAL SIGNS  Patient Vitals for the past 12 hrs:   Temp Pulse Resp BP SpO2   02/01/25 0251 97.9 °F (36.6 °C) 68 16 (!) 126/58 97 %   01/31/25 2350 97.9 °F (36.6 °C) 66 19 135/61 98 %   01/31/25 2001 97.9 °F (36.6 °C) 76 17 120/71 96 %   01/31/25 1932 -- 66 16 -- 96 %       Pain Assessment                Ambulating  no    Shift report given to oncoming nurse at the bedside.    Sarah Faulkner RN     
END OF SHIFT NOTE:    INTAKE/OUTPUT  02/01 0701 - 02/02 0700  In: 356 [P.O.:356]  Out: -   Voiding: yes  Catheter: no  Drain:              Flatus: Patient does have flatus present.    Stool:  3 occurrences.  Imodium given for diarrhea, zofran given for nausea; no vomiting  Characteristics:       Emesis: 0 occurrences.    Characteristics:        VITAL SIGNS  Patient Vitals for the past 12 hrs:   Temp Pulse Resp BP SpO2   02/02/25 0313 98.4 °F (36.9 °C) 64 18 126/61 94 %   02/01/25 2351 98.1 °F (36.7 °C) 66 18 126/60 94 %   02/01/25 2115 -- 74 18 -- 95 %   02/01/25 2030 97.9 °F (36.6 °C) 72 18 (!) 151/70 94 %       Pain Assessment                Ambulating  No.    Shift report given to oncoming nurse at the bedside.    Sarah Faulkner RN     
END OF SHIFT NOTE:    INTAKE/OUTPUT  02/02 0701 - 02/03 0700  In: 234 [P.O.:234]  Out: -   Voiding: Yes, incontinent briefs  Catheter: No  Drain:              Flatus: Patient does have flatus present.    Stool: 2 occurrences.    Characteristics:  Stool Appearance: Loose, Watery  Stool Color: Brown  Stool Amount: Large  Stool Assessment  Incontinence: Yes  Stool Appearance: Loose, Watery  Stool Color: Brown  Stool Amount: Large  Stool Source: Rectum  Last BM (including prior to admit): 02/03/25    Emesis:  0 occurrences.    Characteristics:        VITAL SIGNS  Patient Vitals for the past 12 hrs:   Temp Pulse Resp BP SpO2   02/03/25 1923 98.4 °F (36.9 °C) 71 19 (!) 154/67 91 %   02/03/25 1130 97.9 °F (36.6 °C) 71 16 (!) 148/73 93 %       Pain Assessment  Pain Level: 0 (02/03/25 1330)  Pain Location: Abdomen  Patient's Stated Pain Goal: 0 - No pain    Ambulating  Yes    Shift report given to oncoming nurse at the bedside.    Sarai Yeager, RN     
END OF SHIFT NOTE:    INTAKE/OUTPUT  02/02 0701 - 02/03 0700  In: 234 [P.O.:234]  Out: -   Voiding: yes  Catheter: no  Drain:              Flatus: Patient does have flatus present.    Stool:  0 occurrences.    Characteristics:       Emesis: 0 occurrences.    Characteristics:        VITAL SIGNS  Patient Vitals for the past 12 hrs:   Temp Pulse Resp BP SpO2   02/03/25 0425 98.1 °F (36.7 °C) 68 16 (!) 140/64 90 %   02/02/25 2222 98.8 °F (37.1 °C) 68 16 133/68 90 %   02/02/25 1940 98.2 °F (36.8 °C) 66 19 127/61 90 %       Pain Assessment                Ambulating  Not oob this shift. Slept intermittently. Breathing better per pt report. Incontinent large amounts of urine.     Shift report given to oncoming nurse at the bedside.    Sarah Faulkner RN     
END OF SHIFT NOTE:    INTAKE/OUTPUT  02/03 0701 - 02/04 0700  In: 2079 [P.O.:358; I.V.:948]  Out: 700 [Urine:700]  Voiding: Yes  Catheter: No  Drain:              Flatus: Patient does have flatus present.    Stool: 0 occurrences.    Characteristics:  Stool Appearance: Loose, Watery  Stool Color: Brown  Stool Amount: Large  Stool Assessment  Incontinence: Yes  Stool Appearance: Loose, Watery  Stool Color: Brown  Stool Amount: Large  Stool Source: Rectum  Last BM (including prior to admit): 02/03/25    Emesis: 0 occurrences.    Characteristics:        VITAL SIGNS  Patient Vitals for the past 12 hrs:   Temp Pulse Resp BP SpO2   02/04/25 0305 98.8 °F (37.1 °C) 70 19 (!) 143/61 92 %   02/03/25 2306 98.2 °F (36.8 °C) 69 19 (!) 151/68 90 %   02/03/25 1923 98.4 °F (36.9 °C) 71 19 (!) 154/67 91 %       Pain Assessment  Pain Level: 0 (02/03/25 1330)  Pain Location: Abdomen  Patient's Stated Pain Goal: 0 - No pain    Ambulating  Yes    Shift report given to oncoming nurse at the bedside.    Rhonda Mcqueen RN    
END OF SHIFT NOTE:    INTAKE/OUTPUT  02/04 0701 - 02/05 0700  In: 986.3 [P.O.:598]  Out: 1750 [Urine:1750]  Voiding: Yes  Catheter: No  Drain:   External Urinary Catheter (Active)   Site Assessment Clean,dry & intact 02/04/25 1920   Placement Replaced 02/05/25 0327   Securement Method Securing device (Describe) 02/04/25 1920   Suction 40 mmgHg continuous 02/04/25 1920   Urine Color Yellow 02/04/25 1920   Urine Appearance Clear 02/04/25 1920   Output (mL) 300 mL 02/05/25 0129               Flatus: Patient does have flatus present.    Stool: 0 occurrences.    Characteristics:  Stool Appearance: Loose, Watery  Stool Color: Brown  Stool Amount: Large  Stool Assessment  Incontinence: Yes  Stool Appearance: Loose, Watery  Stool Color: Brown  Stool Amount: Large  Stool Source: Rectum  Last BM (including prior to admit): 02/03/25    Emesis:0  occurrences.    Characteristics:        VITAL SIGNS  Patient Vitals for the past 12 hrs:   Temp Pulse Resp BP SpO2   02/05/25 0451 -- -- -- (!) 136/56 --   02/05/25 0327 98.1 °F (36.7 °C) 66 16 (!) 170/66 91 %   02/04/25 2304 98.1 °F (36.7 °C) 64 16 (!) 163/76 92 %   02/04/25 1950 -- -- 16 -- --   02/04/25 1929 98.1 °F (36.7 °C) 68 17 (!) 151/64 92 %   02/04/25 1900 -- -- -- (!) 167/66 --       Pain Assessment  Pain Level: 5 (02/04/25 1920)  Pain Location: Abdomen  Patient's Stated Pain Goal: 0 - No pain    Ambulating  Yes    Shift report given to oncoming nurse at the bedside.    Rhonda Mcqueen RN    
END OF SHIFT NOTE:    INTAKE/OUTPUT  02/07 0701 - 02/08 0700  In: 420.8   Out: 700 [Urine:700]  Voiding: Yes  Catheter: No  Drain:   External Urinary Catheter (Active)   Site Assessment Clean,dry & intact 02/08/25 0318   Placement Replaced 02/08/25 0318   Securement Method Securing device (Describe) 02/08/25 0318   Catheter Care Catheter/Wick replaced 02/08/25 0318   Perineal Care Yes 02/08/25 0318   Suction 40 mmgHg continuous 02/07/25 2053   Urine Color Yellow 02/08/25 0455   Urine Appearance Clear 02/08/25 0455   Output (mL) 100 mL 02/08/25 0455               Flatus: Patient does have flatus present.    Stool: 4 occurrences.    Characteristics:  Stool Appearance: Loose  Stool Color: Brown  Stool Amount: Medium  Stool Assessment  Incontinence: Yes  Stool Appearance: Loose  Stool Color: Brown  Stool Amount: Medium  Stool Source: Rectum  Last BM (including prior to admit): 02/07/25    Emesis: 0 occurrences.    Characteristics:        VITAL SIGNS  Patient Vitals for the past 12 hrs:   Temp Pulse Resp BP SpO2   02/08/25 0318 98.1 °F (36.7 °C) 70 16 (!) 124/54 95 %   02/07/25 2245 98.4 °F (36.9 °C) 65 15 (!) 128/54 96 %   02/07/25 2055 -- 70 -- 126/66 --   02/07/25 1936 97.9 °F (36.6 °C) 70 18 126/66 98 %       Pain Assessment  Pain Level: 0 (02/06/25 1930)  Pain Location: Leg  Patient's Stated Pain Goal: 0 - No pain    Ambulating  Yes    Shift report given to oncoming nurse at the bedside.    Rhonda Mcqueen, RN    
END OF SHIFT NOTE:    INTAKE/OUTPUT  02/07 0701 - 02/08 0700  In: 420.8   Out: 700 [Urine:700]  Voiding: Yes  Catheter: No  Drain:   External Urinary Catheter (Active)   Site Assessment Clean,dry & intact;Red 02/08/25 1101   Placement Replaced 02/08/25 1101   Securement Method Securing device (Describe) 02/08/25 1101   Catheter Care Catheter/Wick replaced 02/08/25 1101   Perineal Care Yes 02/08/25 1101   Suction 40 mmgHg continuous 02/08/25 1101   Urine Color Yellow 02/08/25 1101   Urine Appearance Clear 02/08/25 1101   Output (mL) 100 mL 02/08/25 1400               Flatus: Patient does have flatus present.    Stool: 0 occurrences.    Characteristics:  Stool Appearance: Loose  Stool Color: Brown  Stool Amount: Medium  Stool Assessment  Incontinence: Yes  Stool Appearance: Loose  Stool Color: Brown  Stool Amount: Medium  Stool Source: Rectum  Last BM (including prior to admit): 02/07/25    Emesis: 0 occurrences.    Characteristics:        VITAL SIGNS  Patient Vitals for the past 12 hrs:   Temp Pulse Resp BP SpO2   02/08/25 1501 98.2 °F (36.8 °C) 69 18 135/66 98 %   02/08/25 1500 -- 72 15 -- 95 %   02/08/25 1101 98.1 °F (36.7 °C) 67 14 134/65 96 %   02/08/25 0800 98.1 °F (36.7 °C) 72 18 134/70 97 %       Pain Assessment  Pain Level: 0 (02/06/25 1930)  Pain Location: Leg  Patient's Stated Pain Goal: 0 - No pain    Ambulating  Yes    Shift report given to oncoming nurse at the bedside.    Derik Mohan RN       
END OF SHIFT NOTE:    INTAKE/OUTPUT  02/08 0701 - 02/09 0700  In: 250 [P.O.:250]  Out: 350 [Urine:350]  Voiding: yes  Catheter: no  Drain:   External Urinary Catheter (Active)   Site Assessment Clean,dry & intact;Red 02/08/25 1101   Placement Replaced 02/08/25 1101   Securement Method Securing device (Describe) 02/08/25 1101   Catheter Care Catheter/Wick replaced 02/08/25 1101   Perineal Care Yes 02/08/25 1101   Suction 40 mmgHg continuous 02/08/25 1101   Urine Color Yellow 02/08/25 1101   Urine Appearance Clear 02/08/25 1101   Output (mL) 100 mL 02/08/25 1400               Flatus: Patient does have flatus present.    Stool:  multiple occurrences.    Characteristics: Given imodium thuis am for multiple stool tonight.        Emesis: 0 occurrences.    Characteristics:        VITAL SIGNS  Patient Vitals for the past 12 hrs:   Temp Pulse Resp BP SpO2   02/09/25 0318 98.4 °F (36.9 °C) 66 16 123/69 95 %   02/08/25 2310 98.1 °F (36.7 °C) 66 16 (!) 123/58 95 %   02/08/25 1926 98.2 °F (36.8 °C) 71 16 122/61 95 %       Pain Assessment                Ambulating  Not oob this shift.     Shift report given to oncoming nurse at the bedside.    Sarah Faulkner RN     
Family at bedside all day. Neurovascular checks remained negative. Ate from meal trays, denies n/v. Reported pain 'in my left leg' son reports she has had sciatica pain there 'for awhile'  
H&P/Consult Note/Progress Note/Office Note:   Jacklyn Castillo  MRN: [id number]  :10/13/1934  Age:89 y.o.    HPI: Jacklyn Castillo is a 89 y.o. female who we are asked by IM to see for abdominal pain with CT evidence of GB distension, ductal dilation and diverticulitis. The patient has a PMHx of Hx of colorectal  cancer 2007 s/p resection with ostomy then reversal & chemotherapy, HTN, HLD, bradycardia s/p PPM, thromboembolus RUE years ago. She presented with a reported month long history of nausea, vomiting and watery diarrhea--all symptoms that have worsened over the past week. She was admitted on 2025 for shortness of breath with elevated BNP 2866.  Additionally, she has a UTI.      CT abd/pelvis and US gallbladder reviewed as below.     The patient describes ill- defined abdominal symptoms.  No RUQ pain described.  Nausea mostly with lower abdominal pain.  Never or chills. No sick family members at home.      Abdominal Surgery Hx: Colorectal malignancy s/p total colectomy  with ostomy/reversal, Hysterectomy.   Anticoagulation: none prior to admission    Admission labs: WBC 13.3-->14.0 today  Hgb 12.4, plts 118 (111 today)  Na 133, LFTs unremarkable    25: alert, NAD. Abd: soft, NT. VSS/AF. LFTs remain wnl. For EGD possible EUS today.     HIDA: Abnormally low gallbladder ejection fraction of 1% indicating dyskinesia. No evidence of cystic or common bile duct obstruction.    25: alert, C/o diarrhea. NAD. Abd: soft, NT. VSS/AF. LFTs remain wnl.     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     
IP Consult to Vascular Access Team  Consult performed by: Van García RN  Consult ordered by: Marc Pearson MD       US Guided PIV access-    Ultrasound was used to find the vein which was compressible and without any ultrasound features of an artery or nerve bundle. Skin was cleaned and disinfected prior to IV puncture.  Under real-time ultrasound guidance peripheral access was obtained in the right forearm using 20 G 1.75\" Peripheral IV catheter after 1 attempt(s). Blood return was present and IV flushed without difficulty with no clinical signs of infiltration. IV dressing applied and no immediate complications noted. Patient tolerated the procedure well.       
Jacklyn Castillo is 89 y.o. y/o female     Jacklyn Castillo is a 89 yo female who presented to the ED with nausea, abdominal pain and diarrhea. PMH includes colon cancer, HTN. GI was consulted for prominent CBD. EUS yesterday with gallbladder drainage. No stone's seen in CBD.     Today patient sitting up in bed. Stated she was feeling terrible with nausea and having diarrhea every few minutes and is incontinent. She denies any blood. No vomiting but has no appetite due to nausea.     Labs: Bili 0.5, ALT 10, AST 36, Alk Phos 76    PE:   Vitals:    01/31/25 0742   BP: (!) 158/79   Pulse: 67   Resp: 20   Temp: 98.2 °F (36.8 °C)   SpO2: 99%      General:  The patient appears well-nourished, and is in no acute distress.    HEENT:  Normocephalic, atraumatic. No sclerae icterus.   Abdomen:  Soft, non tender to palpation. No distention. Normoactive bowel sounds present.    Neurologic:  Alert and oriented x3.  Psychiatric: Appropriate mood and affect.    Assessment and Plan:   #Post-EUS: Patient feeling terrible with constant diarrhea and nausea. Had EUS yesterday with gallbladder drainage. Will check C. Diff.    BERT Acosta - CNP      Physician Attestation:   I have personally discussed the patient's management with the GI PA.  I have fully participated in the care of this patient and have provided the substantive portion including the entire medical decision making (MDM) for this split/shared patient encounter. I have personally reviewed all pertinent clinical information, including history, physical exam, and plan. I have personally reviewed and approved all orders. I agree with the note as written unless otherwise specified below.     Review of Systems: A complete 10-point ROS was conducted - all pertinent positives are per the HPI/Subjective portion of this note, all others are negative.     Patient is s/p EUS for gallbladder drainage, no stones noted in CBD, no ERCP indicated. Patient tolerated procedure, 
Nutrition Assessment  Assessment Type: Initial, LOS  Reason for visit:  Length of Stay  Malnutrition Screening Tool Score: 1    Nutrition Intervention:   Food and/or Nutrient Delivery:   Meals and Snacks:  Diet: Continue current order  Medical Food Supplements:   Medical food supplement therapy:  Initiate Ensure Plus High Protein (high calorie high protein) 350 calories, 20 grams protein per 8 ounce serving   Continue Banatrol with meals per GI     Malnutrition Assessment:  [facility] Clinical Malnutrition Criteria    Nutrition Focused Physical Exam: Unremarkable     Nutrition Assessment:  Food/Nutrition Related History: Pt reports decreased intake since Christmas 2024. She reports decline in appetite over this time. Pt stated her intake was worse for ~2 weeks pta. She denies ONS use pta.      Do You Have Any Cultural, Samaritan, or Ethnic Food Preferences?: No   Weight History: Pt is unsure of any recent wt changes. She stated were loose pta. Per EMR wt hx review 4/4 156lb (IM), 7/30 152lb (cardiology), 11/11 152lb (IM), 1/28 153lb (IM).   Nutrition Background:       PMH significant for HTN and colon cancer. Pt admitted with acute cholecystitis. S/p EUS with axios stent placement 1/30. Now with bile acid diarrhea after procedure.   Nutrition Monitoring/Evaluation:  Pt seen sitting in bed with son present. She reports hx as above. She stated she is consuming her Banatrol however she is not able to eat much if her meals because the Banatrol is filling her up. She reports she ate bites of zuchini and creamed potatoes at lunch today. We discussed trying to sip on Ensure in between meals. She is agreeable to trial Ensure.     Current Nutrition Therapies:  ADULT DIET; Dysphagia - Soft and Bite Sized; 3 carb choices (45 gm/meal); Low Fat/Low Chol/High Fiber/[place]  ADULT ORAL NUTRITION SUPPLEMENT; Breakfast, Lunch, Dinner; Other Oral Supplement; Banana Flakes    Current Intake:   Average Meal Intake: 1-25%    
Pacemaker is set for MRI mode and documents scanned into pt .  
Patient is critically ill.  Without the interventions noted, there is a high probability of imminent acute organ impairment or life-threatening deterioration.  Total critical care time spent: 35 minutes.    Critical care time includes time spent at bedside performing history and exam, performing chart review, discussing findings and treatment plan with patient and/or family, discussing patient with consultants and colleagues, ordering and reviewing pertinent laboratory and radiographic evaluations, and discussing patient with nursing staff. Time excludes procedures.        Patient's son at bedside conversing with patient and at 4:45 PM complained left foot pain.  Patient then drifted off by his report and nursing entered room and found patient poorly responsive with possible left facial droop.  She is weak diffusely which is an acute change.  See progress note earlier today.  She did start to receive tramadol as needed for pain today and received her home 12.5 mg Antivert requested for nausea at 1030 this morning.  Code stroke called.  Stat CT head, CT angiogram and MRI brain to be ordered.  Patient has DO NOT RESUSCITATE status per son and he would not desire any aggressive intervention.  Will need to clarify CODE STATUS with healthcare power of  son.    Impression/plan:    Acute change in mental status with possible left facial droop.  Stroke workup, swallowing eval and if stable soluble baby aspirin and high intensity statin pending workup to include CT head CT angiogram and MRI brain.  Neurochecks, permissive hypertension-see stroke order set ordered.  Hold all other meds for now resume pending clinical course.  
Patient with new onset c/o abd pain rated 10/10 and nausea. Notified Hospitalist patient does not have any pain medication ordered.     Zofran 4 mg administered slow IVP and order received for Tramadol prn.  
Perfect serve to provider r/t AM labs  
Physical Therapy Note:    Attempted to see patient this PM for physical therapy treatment  session. Patient c/o her leg hurting during weight bearing   RN made aware. Will follow and re-attempt as schedule permits/patient available. Thank you,    Virginia Weinberg, [facility]     Rehab Caseload Tracker    
Physical Therapy Note:    Attempted to see patient this PM for physical therapy treatment  session. Patient was receiving another procedure at time of arrival. . Will follow and re-attempt as schedule permits/patient available. Thank you,    Latrice Haider, [facility]     Rehab Caseload Tracker   
Physical Therapy Note:    Attempted to see patient this PM for physical therapy treatment  session. Treatment deferred as the patient states that she just finished working with OT. Will follow and re-attempt as schedule permits/patient available. Thank you,    Virginia Weinberg, [facility]     Rehab Caseload Tracker    
Please complete MRI screening form with signatures, thank you.  
Pt c/o worsening feeling of SOB that has \"been there all day but has gotten worse tonight. \" Bibasilar fine crackles, Trace bilateral ankle edema . Sats 92 on room air. Messaged hospitalists; lasix ordered one time and given. 0100-Incontinent large amount of urine. Feeling better.   
Rapid response code stroke called. Patient lethargic. States can not breath. BP= 164/80, pulse=64, 02=92 RA. Applied 02 @ 2 L NC. Patients left eye closed. Will not follow commands to open eye. Flaccid to all extremities.    Dr. Birmingham and YOGI meyers RN in room.    NS@ 100 ml/hr ordered.  
SBAR in from AUNG Galan.  
SBAR out to Pippa HORAN.  
Spiritual Health History and Assessment/Progress Note  [facility]    (P) Spiritual/Emotional Needs,  ,  ,      Name: Jacklyn Castillo MRN: [id number]    Age: 89 y.o.     Sex: female   Language: English   Roman Catholic: Anglican   Acute cholecystitis     Date: 1/30/2025            Total Time Calculated: (P) 20 min              Spiritual Assessment began in [facility] SURG[facility]        Referral/Consult From: (P) Rounding   Encounter Overview/Reason: (P) Spiritual/Emotional Needs  Service Provided For: (P) Patient and family together    Luli, Belief, Meaning:   Patient identifies as spiritual  Family/Friends identify as spiritual      Importance and Influence:  Patient has no beliefs influential to healthcare decision-making identified during this visit  Family/Friends have no beliefs influential to healthcare decision-making identified during this visit    Community:  Patient feels well-supported. Support system includes: Spouse/Partner and Children  Family/Friends feel well-supported. Support system includes: Spouse/Partner and Parent/s    Assessment and Plan of Care:     Patient Interventions include: Facilitated expression of thoughts and feelings, Affirmed coping skills/support systems, and Provided sacramental/Temple ritual  Family/Friends Interventions include: Facilitated expression of thoughts and feelings, Affirmed coping skills/support systems, and Provided sacramental/Temple ritual    Patient Plan of Care: Spiritual Care available upon further referral  Family/Friends Plan of Care: Spiritual Care available upon further referral    Electronically signed by ANAM GRIFFITH on 1/30/2025 at 10:46 AM    
Spiritual Health History and Assessment/Progress Note  [facility]    Crisis, Rapid Response,  ,      Name: Jacklyn Castillo MRN: [id number]    Age: 89 y.o.     Sex: female   Language: English   Latter day: Mormonism   Acute cholecystitis     Date: 2/5/2025            Total Time Calculated: 25 min              Spiritual Assessment began in [facility]        Referral/Consult From: Nurse   Encounter Overview/Reason: Crisis  Service Provided For: Patient    Luli, Belief, Meaning:   Patient identifies as spiritual  Family/Friends identify as spiritual      Importance and Influence:  Patient has spiritual/personal beliefs that influence decisions regarding their health  Family/Friends have spiritual/personal beliefs that influence decisions regarding the patient's health    Community:  Patient feels well-supported. Support system includes: Children  Family/Friends feel well-supported. Support system includes: Children    Assessment and Plan of Care:     Patient Interventions include: Provided sacramental/Spiritism ritual  Family/Friends Interventions include: Provided sacramental/Spiritism ritual and Facilitated life review and/or legacy    Patient Plan of Care: Spiritual Care available upon further referral  Family/Friends Plan of Care: Spiritual Care available upon further referral    Electronically signed by Chaplain Irving on 2/5/2025 at 6:19 PM   
TRANSFER - OUT REPORT:    Verbal report given to Bianca on Jacklyn Castillo  being transferred to [facility]   for routine progression of patient care       Report consisted of patient's Situation, Background, Assessment and   Recommendations(SBAR).     Information from the following report(s) Nurse Handoff Report, ED Encounter Summary, ED SBAR, Adult Overview, Intake/Output, MAR, and Recent Results was reviewed with the receiving nurse.    Melvin Fall Assessment:    Presents to emergency department  because of falls (Syncope, seizure, or loss of consciousness): No  Age > 70: Yes  Altered Mental Status, Intoxication with alcohol or substance confusion (Disorientation, impaired judgment, poor safety awaremess, or inability to follow instructions): No  Impaired Mobility: Ambulates or transfers with assistive devices or assistance; Unable to ambulate or transer.: No  Nursing Judgement: No          Lines:   Peripheral IV 01/28/25 Left Antecubital (Active)   Site Assessment Clean, dry & intact 01/28/25 1730   Line Status Blood return noted;Flushed 01/28/25 1730   Phlebitis Assessment No symptoms 01/28/25 1730   Infiltration Assessment 0 01/28/25 1730   Alcohol Cap Used No 01/28/25 1730   Dressing Status New dressing applied 01/28/25 1730   Dressing Type Transparent 01/28/25 1730   Dressing Intervention New 01/28/25 1730        Opportunity for questions and clarification was provided.      Patient transported with:  O2 @ 1lpm       
TRANSFER - OUT REPORT:    Verbal report given to Tete Castillo  being transferred to [facility] for routine progression of patient care       Report consisted of patient's Situation, Background, Assessment and   Recommendations(SBAR).     Information from the following report(s) Nurse Handoff Report was reviewed with the receiving nurse.           Lines:   Peripheral IV 02/05/25 Right (Active)   Site Assessment Clean, dry & intact 02/09/25 0857   Line Status Capped;Flushed 02/09/25 0857   Line Care Ports disinfected 02/09/25 0857   Phlebitis Assessment No symptoms 02/09/25 0857   Infiltration Assessment 0 02/09/25 0857   Alcohol Cap Used Yes 02/09/25 0857   Dressing Status Clean, dry & intact 02/09/25 0857   Dressing Type Transparent 02/09/25 0857        Opportunity for questions and clarification was provided.      Patient transported with:  Tech       
To ERCP.   
68.9 kg (152 lb) (1/30), Weight source: Stated  BMI: 28.7, Overweight (BMI 25.0-29.9)  Admission Body Weight: 68.9 kg (152 lb) (1/30- stated)  Ideal Body Weight (Kg) (Calculated): 48 kg (105 lbs),    BMI Category Overweight (BMI 25.0-29.9)  Comparative Standards:  Energy (kcal/day): 7252-1458 (18-22 kcal/kg) (Kcal/kg Weight used: 68.9 kg Admission  Protein (g/day): 69-83 (1-1.2 g/kg) Weight Used: (Admission) 68.9 kg  Fluid (ml/day):   (1 ml/kcal)    Nutrition Diagnosis:   Inadequate oral intake related to decreased appetite as evidenced by patient reported barriers    Nutrition Goal(s):   Previous Goal Met: Progressing toward Goal(s)  Active Goal:  (intake to meet >50% of needs by next RD assessment)  Type of Goal: Continue current goal    Discharge Planning:    Too soon to determine    SHELTON MARRERO, BARON    
Dressing [] [] [] [] [] [] [] [] [] []    Lower Body Dressing [] [] [] [] [] [] [] [] [] []    Feeding [] [] [] [] [] [] [] [] [] []    Grooming [] [] [] [] [] [] [] [] [] []    Personal Device Care [] [] [] [] [] [] [] [] [] []    Functional Mobility [] [] [] [] [] [x] [] [] [] [] Sit to stands   I=Independent, Mod I=Modified Independent, S=Supervision/Setup, SBA=Standby Assistance, CGA=Contact Guard Assistance, Min=Minimal Assistance, Mod=Moderate Assistance, Max=Maximal Assistance, Total=Total Assistance, NT=Not Tested    BALANCE: Good Fair+ Fair Fair- Poor NT Comments   Sitting Static [x] [] [] [] [] []    Sitting Dynamic [x] [] [] [] [] []              Standing Static [] [] [x] [] [] []    Standing Dynamic [] [] [] [] [] [x]        PLAN:     FREQUENCY/DURATION   OT Plan of Care: 3 times/week for duration of hospital stay or until stated goals are met, whichever comes first.    TREATMENT:     TREATMENT:   Therapeutic Activity (23 Minutes): Patient participated in therapeutic activities including bed mobility training, functional transfer training, sitting tolerance activity, and standing tolerance activity with minimal assistance and verbal cues in order to increase independence, increase safety awareness, increase activity tolerance, and prepare for functional activity.     TREATMENT GRID:  N/A    AFTER TREATMENT PRECAUTIONS: Alarm Activated, Bed, Bed/Chair Locked, Call light within reach, Needs within reach, RN notified, and Visitors at bedside    INTERDISCIPLINARY COLLABORATION:  RN/ PCT    EDUCATION:       TOTAL TREATMENT DURATION AND TIME:  Time In: 1137  Time Out: 1200  Minutes: 23    GARY Medina              
Mod=Moderate Assistance, Max=Maximal Assistance, Total=Total Assistance, NT=Not Tested    BALANCE: Good Fair+ Fair Fair- Poor NT Comments   Sitting Static [x] [] [] [] [] []    Sitting Dynamic [x] [] [] [] [] []              Standing Static [x] [] [] [] [] []    Standing Dynamic [] [x] [] [] [] []      GAIT: I Mod I S SBA CGA Min Mod Max Total  NT x2 Comments:   Level of Assistance [] [] [] [x] [] [] [] [] [] [] []    Distance 50  feet    DME Rolling Walker    Gait Quality Decreased janki     Weightbearing Status      Stairs      I=Independent, Mod I=Modified Independent, S=Supervision, SBA=Standby Assistance, CGA=Contact Guard Assistance,   Min=Minimal Assistance, Mod=Moderate Assistance, Max=Maximal Assistance, Total=Total Assistance, NT=Not Tested    PLAN:   FREQUENCY AND DURATION: 3 times/week for duration of hospital stay or until stated goals are met, whichever comes first.    TREATMENT:   TREATMENT:   Therapeutic Activity (23 Minutes): Therapeutic activity included Rolling, Supine to Sit, Sit to Supine, Scooting, Transfer Training, Ambulation on level ground, Sitting balance , and Standing balance to improve functional Activity tolerance, Balance, Coordination, Mobility, and Strength.    TREATMENT GRID:  N/A    AFTER TREATMENT PRECAUTIONS: Bed/Chair Locked, Call light within reach, Chair, Needs within reach, RN notified, and Visitors at bedside    INTERDISCIPLINARY COLLABORATION:  RN/ PCT and PT/ PTA    EDUCATION:  review POC and importance of mobility in the recovery process    TIME IN/OUT:  Time In: 1040  Time Out: 1103  Minutes: 23    Virginia Weinberg PTA    
related to malignant process-stable during hospital stay.  Not a candidate for bisphosphonate or Miacalcin by protocol            Primary hypertension  - continue losartan               Anticipated Discharge Arrangements:   Placement for rehab     PT/OT evals ordered?  YES home health  Diet:  ADULT DIET; Regular; GI [place] (GERD/Peptic Ulcer)  VTE prophylaxis: held  Code status: Full Code            Non-peripheral Lines and Tubes (if present):      External Urinary Catheter (Active)        Telemetry (if present):  Cardiac/Telemetry Monitor On: No        Hospital Problems:  Principal Problem:    Suspected acute cholecystitis  Active Problems:    Pacemaker    Hx of malignant neoplasm of colon    CLL (chronic lymphocytic leukemia) (HCC)    Primary hypertension    Hypomagnesemia    Acute heart failure with preserved ejection fraction (HCC)    Nausea vomiting and diarrhea    Diverticulitis    Hypercalcemia    Common bile duct dilatation    Diverticulitis of colon    Bilateral pleural effusion    Hyponatremia    Tinea inguinalis    Leukocytosis    Chronic leukemia (HCC)  Resolved Problems:    * No resolved hospital problems. *      Objective:   Patient Vitals for the past 24 hrs:   Temp Pulse Resp BP SpO2   02/08/25 1101 98.1 °F (36.7 °C) 67 14 134/65 96 %   02/08/25 0800 98.1 °F (36.7 °C) 72 18 134/70 97 %   02/08/25 0318 98.1 °F (36.7 °C) 70 16 (!) 124/54 95 %   02/07/25 2245 98.4 °F (36.9 °C) 65 15 (!) 128/54 96 %   02/07/25 2055 -- 70 -- 126/66 --   02/07/25 1936 97.9 °F (36.6 °C) 70 18 126/66 98 %   02/07/25 1529 -- -- -- -- 97 %   02/07/25 1523 98.4 °F (36.9 °C) 72 24 (!) 115/59 98 %       Oxygen Therapy  SpO2: 96 %  Pulse Oximetry Type: Intermittent  Pulse via Oximetry: 66 beats per minute  Pulse Oximeter Device Mode: Intermittent  Pulse Oximeter Device Location: Right  O2 Device: Nasal cannula  Skin Assessment: Clean, dry, & intact  Skin Protection for O2 Device: N/A  FiO2 : 28 %  O2 Flow Rate (L/min): 2 
(H) 4.3 - 11.1 K/uL    RBC 3.47 (L) 4.05 - 5.2 M/uL    Hemoglobin 11.3 (L) 11.7 - 15.4 g/dL    Hematocrit 35.6 (L) 35.8 - 46.3 %    .6 (H) 82 - 102 FL    MCH 32.6 26.1 - 32.9 PG    MCHC 31.7 31.4 - 35.0 g/dL    RDW 16.6 (H) 11.9 - 14.6 %    Platelets 111 (L) 150 - 450 K/uL    MPV 12.8 (H) 9.4 - 12.3 FL    nRBC 0.06 0.0 - 0.2 K/uL       Current Meds:  Current Facility-Administered Medications   Medication Dose Route Frequency    allopurinol (ZYLOPRIM) tablet 100 mg  100 mg Oral Daily    losartan (COZAAR) tablet 25 mg  25 mg Oral Daily    sodium chloride flush 0.9 % injection 5-40 mL  5-40 mL IntraVENous 2 times per day    sodium chloride flush 0.9 % injection 5-40 mL  5-40 mL IntraVENous PRN    0.9 % sodium chloride infusion   IntraVENous PRN    ondansetron (ZOFRAN-ODT) disintegrating tablet 4 mg  4 mg Oral Q8H PRN    Or    ondansetron (ZOFRAN) injection 4 mg  4 mg IntraVENous Q6H PRN    acetaminophen (TYLENOL) tablet 650 mg  650 mg Oral Q6H PRN    Or    acetaminophen (TYLENOL) suppository 650 mg  650 mg Rectal Q6H PRN    pantoprazole (PROTONIX) 40 mg in sodium chloride (PF) 0.9 % 10 mL injection  40 mg IntraVENous Q12H    piperacillin-tazobactam (ZOSYN) 3,375 mg in sodium chloride 0.9 % 50 mL IVPB (mini-bag)  3,375 mg IntraVENous Q8H    0.9 % sodium chloride infusion   IntraVENous Continuous     Current Outpatient Medications   Medication Sig    ondansetron (ZOFRAN) 4 MG tablet Take 1 tablet by mouth 3 times daily as needed for Nausea or Vomiting    allopurinol (ZYLOPRIM) 100 MG tablet Take 1 tablet by mouth daily    colchicine (COLCRYS) 0.6 MG tablet Take 1-2 tablets PRN orally at the first sign of a gout flare followed by 0.6 mg (1 tablet) one hour later; MAX 1.8 mg over 1 hour (DNE 1.8 mg in 24 hours)    losartan (COZAAR) 50 MG tablet Take 0.5 tablets by mouth daily I have decreased dose so that your blood pressure does not drop too low    diclofenac sodium (VOLTAREN) 1 % GEL Apply 2 g topically 4 times 
Granulocytes Absolute 0.07 0.0 - 0.5 K/UL   Comprehensive Metabolic Panel w/ Reflex to MG    Collection Time: 02/01/25  5:49 AM   Result Value Ref Range    Sodium 134 (L) 136 - 145 mmol/L    Potassium 3.3 (L) 3.5 - 5.1 mmol/L    Chloride 96 (L) 98 - 107 mmol/L    CO2 23 20 - 29 mmol/L    Anion Gap 14 7 - 16 mmol/L    Glucose 105 (H) 70 - 99 mg/dL    BUN 14 8 - 23 MG/DL    Creatinine 0.98 0.60 - 1.10 MG/DL    Est, Glom Filt Rate 55 (L) >60 ml/min/1.73m2    Calcium 11.4 (H) 8.8 - 10.2 MG/DL    Total Bilirubin 0.4 0.0 - 1.2 MG/DL    ALT 10 8 - 45 U/L    AST 32 15 - 37 U/L    Alk Phosphatase 73 35 - 104 U/L    Total Protein 5.9 (L) 6.3 - 8.2 g/dL    Albumin 3.1 (L) 3.2 - 4.6 g/dL    Globulin 2.8 2.3 - 3.5 g/dL    Albumin/Globulin Ratio 1.1 1.0 - 1.9         No results for input(s): \"COVID19\" in the last 72 hours.    Current Meds:  Current Facility-Administered Medications   Medication Dose Route Frequency    medicated lip ointment (BLISTEX)   Topical BID PRN    loperamide (IMODIUM) capsule 2 mg  2 mg Oral TID PRN    albuterol (PROVENTIL) (2.5 MG/3ML) 0.083% nebulizer solution 2.5 mg  2.5 mg Nebulization 4x Daily RT    ceFEPIme (MAXIPIME) 2,000 mg in sterile water 20 mL IV syringe  2,000 mg IntraVENous Q24H    pantoprazole (PROTONIX) tablet 40 mg  40 mg Oral QAM AC    famotidine (PEPCID) tablet 20 mg  20 mg Oral Daily    metroNIDAZOLE (FLAGYL) 500 mg in 0.9% NaCl 100 mL IVPB premix  500 mg IntraVENous Q12H    furosemide (LASIX) tablet 20 mg  20 mg Oral Daily    allopurinol (ZYLOPRIM) tablet 100 mg  100 mg Oral Daily    losartan (COZAAR) tablet 25 mg  25 mg Oral Daily    sodium chloride flush 0.9 % injection 5-40 mL  5-40 mL IntraVENous 2 times per day    sodium chloride flush 0.9 % injection 5-40 mL  5-40 mL IntraVENous PRN    0.9 % sodium chloride infusion   IntraVENous PRN    ondansetron (ZOFRAN-ODT) disintegrating tablet 4 mg  4 mg Oral Q8H PRN    Or    ondansetron (ZOFRAN) injection 4 mg  4 mg IntraVENous Q6H PRN    
advised.    Moderate right-sided hydronephrosis is identified. Clinical and imaging  surveillance is advised.    Aorta and IVC are unremarkable.        Electronically signed by Pedro Mike        Admission date (for inpatients): 1/28/2025   * No surgery date entered *  Procedure(s):  ENDOSCOPIC ULTRASOUND  ENDOSCOPIC RETROGRADE CHOLANGIOPANCREATOGRAPHY    ASSESSMENT/PLAN:  89 yr old female admitted with month long history of nausea, vomiting and diarrhea--worse over past week.   CT evidence of distended GB with intrahepatic biliary ductal dilation/US GB with sludge, no stones and CBD dilation and intrahepatic ductal dilation  UTI    Principal Problem:    Suspected acute cholecystitis  Active Problems:    Pacemaker    Hx of malignant neoplasm of colon    Primary hypertension    Hypomagnesemia    Nausea vomiting and diarrhea    Possible diverticulitis    Hypercalcemia    Common bile duct dilatation  Resolved Problems:    * No resolved hospital problems. *       Agree with current care per IM  GI planning  - EUS/possible ERCP today w GI- will follow up results  HIDA GB dyskinesia without duct obstruction      Signed:  HEIDY MCCLURE, BERT - CNP   
 30 mL Oral Q6H PRN    medicated lip ointment (BLISTEX)   Topical BID PRN    loperamide (IMODIUM) capsule 2 mg  2 mg Oral TID PRN    pantoprazole (PROTONIX) tablet 40 mg  40 mg Oral QAM AC    famotidine (PEPCID) tablet 20 mg  20 mg Oral Daily    metroNIDAZOLE (FLAGYL) 500 mg in 0.9% NaCl 100 mL IVPB premix  500 mg IntraVENous Q12H    [Held by provider] furosemide (LASIX) tablet 20 mg  20 mg Oral Daily    allopurinol (ZYLOPRIM) tablet 100 mg  100 mg Oral Daily    losartan (COZAAR) tablet 25 mg  25 mg Oral Daily    sodium chloride flush 0.9 % injection 5-40 mL  5-40 mL IntraVENous 2 times per day    sodium chloride flush 0.9 % injection 5-40 mL  5-40 mL IntraVENous PRN    0.9 % sodium chloride infusion   IntraVENous PRN    ondansetron (ZOFRAN-ODT) disintegrating tablet 4 mg  4 mg Oral Q8H PRN    Or    ondansetron (ZOFRAN) injection 4 mg  4 mg IntraVENous Q6H PRN    acetaminophen (TYLENOL) tablet 650 mg  650 mg Oral Q6H PRN    Or    acetaminophen (TYLENOL) suppository 650 mg  650 mg Rectal Q6H PRN    Technetium Tc 99m Mebrofenin (CHOLETEC) injectin 5.5 millicurie  5.5 millicurie IntraVENous PRN       Signed:  Ryne Birmingham DO    Part of this note may have been written by using a voice dictation software.  The note has been proof read but may still contain some grammatical/other typographical errors.   
0.9 % injection 5-40 mL  5-40 mL IntraVENous 2 times per day    sodium chloride flush 0.9 % injection 5-40 mL  5-40 mL IntraVENous PRN    0.9 % sodium chloride infusion   IntraVENous PRN    ondansetron (ZOFRAN-ODT) disintegrating tablet 4 mg  4 mg Oral Q8H PRN    Or    ondansetron (ZOFRAN) injection 4 mg  4 mg IntraVENous Q6H PRN    acetaminophen (TYLENOL) tablet 650 mg  650 mg Oral Q6H PRN    Or    acetaminophen (TYLENOL) suppository 650 mg  650 mg Rectal Q6H PRN    Technetium Tc 99m Mebrofenin (CHOLETEC) injectin 5.5 millicurie  5.5 millicurie IntraVENous PRN       Signed:  Ryne Birmingham DO    Part of this note may have been written by using a voice dictation software.  The note has been proof read but may still contain some grammatical/other typographical errors.

## 2025-02-09 NOTE — DISCHARGE SUMMARY
Hospitalist Discharge Summary   Admit Date:  2025  7:04 PM   DC Note date: 2025  Name:  Jacklyn Castillo   Age:  89 y.o.  Sex:  female  :  10/13/1934   MRN:  [id number]   Room:  [facility]  PCP:  Belkys Cagle APRN - NP    Presenting Complaint: Shortness of Breath     Initial Admission Diagnosis: Cholecystitis [K81.9]  Diverticulitis [K57.92]  Diverticulitis of colon [K57.32]  Common bile duct dilatation [K83.8]  Acute on chronic congestive heart failure, unspecified heart failure type (HCC) [I50.9]  Dilation of common bile duct [K83.8]     Problem List for this Hospitalization (present on admission):    Principal Problem:    Suspected acute cholecystitis  Active Problems:    Pacemaker    Hx of malignant neoplasm of colon    CLL (chronic lymphocytic leukemia) (HCC)    Primary hypertension    Hypomagnesemia    Acute heart failure with preserved ejection fraction (HCC)    Nausea vomiting and diarrhea    Diverticulitis    Hypercalcemia    Common bile duct dilatation    Diverticulitis of colon    Bilateral pleural effusion    Hyponatremia    Tinea inguinalis    Leukocytosis    Chronic leukemia (HCC)  Resolved Problems:    * No resolved hospital problems. *      Hospital Course:  Please refer to the admission H&P for details of presentation. In summary, Jacklyn Castillo is a 89 y.o. female with past medical history significant for colon cancer, hypertension who presented with nausea, vomiting and diarrhea.  Urinalysis positive for nitrates, trace leukocyte esterase and 4+ bacteria.  CT abdomen pelvis with gallbladder distention and mild intrahepatic biliary ductal dilation, moderate bilateral pleural effusion and possible diverticulitis.  Ultrasound with nonspecific moderate intrahepatic biliary ductal dilation with CBD at 8 mm, gallbladder/with distention and wall thickening.  HIDA scan with gallbladder dyskinesia without duct obstruction.  Patient underwent ERCP/EUS with gallbladder sludge, CBD 9 mm

## 2025-02-09 NOTE — CARE COORDINATION
Pt is for discharge today to [facility], Room# 320 as planned. Report is [phone]. Transport via [facility] around 3:30 pm.  Packet prepared to go with pt to facility.  MSW spoke with patient at bedside with update on anticipated transport time. IMM2 given.

## 2025-02-10 ENCOUNTER — CARE COORDINATION (OUTPATIENT)
Dept: CARE COORDINATION | Age: 89
End: 2025-02-10

## 2025-02-10 LAB
BACTERIA SPEC CULT: NORMAL
BACTERIA SPEC CULT: NORMAL
SERVICE CMNT-IMP: NORMAL
SERVICE CMNT-IMP: NORMAL

## 2025-02-10 NOTE — CARE COORDINATION
SECURE email notification sent to identified IDT members at   [facility]

## 2025-02-11 LAB
FLOW CYTOMETRY RESULTS: NORMAL
SPECIMEN SOURCE: NORMAL
TEST ORDERED: NORMAL

## 2025-02-20 ENCOUNTER — CARE COORDINATION (OUTPATIENT)
Dept: CARE COORDINATION | Facility: CLINIC | Age: 89
End: 2025-02-20

## 2025-02-20 NOTE — CARE COORDINATION
Post Acute Facility Update    Care Transitions Post Acute Facility Update    Care Transitions Interventions      Post Acute Facility Update   Post Acute Facility: [facility]          Nursing   Prescribed diet: soft chew        Rehab/Functional   Prior Level of Functioning: At baseline pt lives with her  (who she is the caregiver of), is independent with ADLs, and uses a SPC or rollator PRN for mobility. Pt also has supportive children who live close by.   Cognitive function assessment: WFL   ADLs: Minimal Assistance   Bed Mobility: Contact Guard Assist - Hands on patient for balance   Transfer Assistance: Contact Guard Assist - Hands on patient for balance   Ambulation Assistance: Contact Guard Assist - Hands on patient for balance   How far (in feet) is the patient ambulating?: 100   Does patient use an assistive device?:  (Comment: FWW)   Rehab Notes: Therapeutic activity included Rolling, Supine to Sit, Scooting, Transfer Training, Ambulation on level ground, Sitting balance , and Standing balance to improve functional Activity tolerance, Balance, Coordination, Mobility, and Strength.  ambulating x100' c FWW; continuing to desat with exertion; CGA with self care tasks and transfers          SW/Discharge Planning   Goals of Care: At baseline pt lives with her  (who she is the caregiver of), is independent with ADLs, and uses a SPC or rollator PRN for mobility. Pt also has supportive children who live close by.   Caregiver support: Returning home with  and HH TBD   Additional caregiver needs: TBD   Discharge Planning / Barriers: At baseline pt lives with her  (who she is the caregiver of), is independent with ADLs, and uses a SPC or rollator PRN for mobility. Pt also has supportive children who live close by.   Next IDT Planned Review: 2/27/25           Next IDT Planned Review: 2/27/25    Future Appointments   Date Time Provider Department Center   2/25/2025  8:30

## 2025-02-25 ENCOUNTER — TELEPHONE (OUTPATIENT)
Dept: ONCOLOGY | Age: 89
End: 2025-02-25

## 2025-02-25 NOTE — TELEPHONE ENCOUNTER
D/C DATE: 3/4/2025    HOSPITAL PT D/C'd FROM: [facility] PT D/C'd FROM: [facility]    D/C TO: Home    PHONE NUMBER TO REACH PATIENT: [phone]    F/U APPT DATE & TIME: 3/27 @ 2:40

## 2025-02-26 NOTE — TELEPHONE ENCOUNTER
Spoke with Mr. Castillo. He states patient is still admitted in [facility]. They will reevaluate her next week to see if she is strong enough to go home.

## 2025-02-27 ENCOUNTER — CARE COORDINATION (OUTPATIENT)
Dept: CARE COORDINATION | Facility: CLINIC | Age: 89
End: 2025-02-27

## 2025-02-27 NOTE — CARE COORDINATION
Post Acute Facility Update    Care Transitions Post Acute Facility Update    Care Transitions Interventions      Post Acute Facility Update   Post Acute Facility: [facility]   Prescribed diet: ADULT DIET; Easy to Chew ADULT ORAL NUTRITION SUPPLEMENT; Breakfast, Lunch, Dinner; Standard High Calorie/High Protein Oral Supplement Code Status: DNR    Nutrition intake assessment: adequate   Medical equipment being used: trying to wean from 02 NC 2L - Desatting with therapy   Reported Nursing Updates: Jacklyn Castillo admits to [facility] from hospital with diagnosis due to diverticulitis without perforation or abscess medical hx. hypokalemia;gout;cholecystitis;heart failure;GERD;HLD;HTN. Medication/supplements:Ensure+;omeprazole;furosemide;famotidine;B-12;ASA;Ondansetron HCL;Atorvastatin;allopurinol;colchicine;cholecaldiferol;meclizine;metoprolol;KCL ER;loperamide;magnesium;miconazole-zinc oinment;banatrol+. Diuretic may cause electrolyte imbalances and expected weight loss. Diet orders for Regular, mechanical soft, thin fluids. PO [id number]% Ht. 61\" Wt 145.5# on 2/11 BMI 27.5 overweight . Difficulty swallowing, no chewing problems, referred to SLP. Nutrition problems:Increased nutrient need r/t impaired skin aeb coccyx open area  Altered diet texture r/t difficulty swallowing,Coughing or choking during meals or when swallowing medications aeb slp evaluation  At risk of malnutrition r/t decreased intakes MNA score  Overweight r/t excess kcal intake aeb BMI 27.5.   Labs: hx. of hyponatremia, no new labs. Skin :Coccyx 0.5x0.5. Nutrition interventions: Ensure + qd;slp consult, education on current texture and nutrients needed to heal skin, son and patient present. Selective menu available to resident to make meal choices. Will monitor for wt trends, po intake, skin integrity, and nutrition related labs. Care plan updated.    Patient admitted for diverticulitis . Patient denies any pain and

## 2025-03-05 ENCOUNTER — CLINICAL DOCUMENTATION (OUTPATIENT)
Dept: INTERNAL MEDICINE CLINIC | Facility: CLINIC | Age: 89
End: 2025-03-05

## 2025-03-05 NOTE — PROGRESS NOTES
Patient son called stated the patient has just gotten home from rehab and not having any urine output. We did not have any immediate openings the son stated he would take the patient to the ER.

## 2025-03-06 ENCOUNTER — TELEPHONE (OUTPATIENT)
Dept: INTERNAL MEDICINE CLINIC | Facility: CLINIC | Age: 89
End: 2025-03-06

## 2025-03-06 ENCOUNTER — CARE COORDINATION (OUTPATIENT)
Dept: CARE COORDINATION | Facility: CLINIC | Age: 89
End: 2025-03-06

## 2025-03-06 DIAGNOSIS — N30.00 ACUTE CYSTITIS WITHOUT HEMATURIA: Primary | ICD-10-CM

## 2025-03-06 DIAGNOSIS — R19.7 DIARRHEA OF PRESUMED INFECTIOUS ORIGIN: ICD-10-CM

## 2025-03-06 RX ORDER — CIPROFLOXACIN 500 MG/1
500 TABLET, FILM COATED ORAL 2 TIMES DAILY
Qty: 14 TABLET | Refills: 0 | Status: SHIPPED | OUTPATIENT
Start: 2025-03-06 | End: 2025-03-13

## 2025-03-06 NOTE — CARE COORDINATION
Care Transitions Note    Initial Call - Call within 2 business days of discharge: Yes    Attempted to reach patient for transitions of care follow up. Unable to reach patient.    Outreach Attempts:   HIPAA compliant voicemail left for patient.     Patient: Jacklyn Castillo    Patient : 10/13/1934   MRN: [id number]    Reason for Admission: Diverticulitis     Last Discharge Facility       Date Complaint Diagnosis Description Type Department Provider    25 Shortness of Breath Diverticulitis of colon ... ED to Hosp-Admission (Discharged) (ADMITTED) [id number] Erwin MD; Melquiades Kinney ...            Was this an external facility discharge? Yes. Discharge Date: 3/4/25. Facility Name: [facility]    Follow Up Appointment:     Future Appointments         Provider Specialty Dept Phone    3/27/2025 2:40 PM Belkys Cagle APRN - NP Internal Medicine [phone]    4/10/2025 4:15 PM Robert Bragg MD Cardiology [phone]    2025 8:30 AM Diane Corea MD Gastroenterology [phone]    2025 2:00 PM Belkys Cagle APRN - NP Internal Medicine [phone]            Plan for follow-up on next business day.      Katheryn Cruz RN

## 2025-03-06 NOTE — TELEPHONE ENCOUNTER
Walker (son),  has called and stated that he had Ms. Castillo in the ER yesterday and she was diagnosed with an UTI.  He states that they sent her home with a catheter and  with no medication. She is burning every time she uses the restroom.  He si going to call the pharmacy to see if anything was called in for her. If not, he wants to know if Belkys can call her in something for the UTI?  Please advise.

## 2025-03-06 NOTE — TELEPHONE ENCOUNTER
Son Walker called again stating a dx of UTI was not on the paperwork but pt was burning and very miserable. Stated HH was to come out this morning but they are coming out tomorrow instead to get her started with HH.    Son's Call Back #[phone]

## 2025-03-06 NOTE — TELEPHONE ENCOUNTER
Called and discussed with son. Pt continues to have liquid diarrhea with incontinence. Cipro sent. Need to collect stool for cdi

## 2025-03-07 ENCOUNTER — CARE COORDINATION (OUTPATIENT)
Dept: CARE COORDINATION | Facility: CLINIC | Age: 89
End: 2025-03-07

## 2025-03-07 NOTE — CARE COORDINATION
Care Transitions Note    Initial Call - Call within 2 business days of discharge: Yes    Patient Current Location:  Home: [address] [place] [id number]    Post Acute Care Manager contacted the  S on, SonWalker, by telephone to perform post hospital discharge assessment, verified name and  as identifiers. Provided introduction to self, and explanation of the Post Acute Care Manager role.     Patient: Jacklyn Castillo    Patient : 10/13/1934   MRN: [id number]    Reason for Admission: Suspected acute cholecystitis         Last Discharge Facility       Date Complaint Diagnosis Description Type Department Provider    25 Shortness of Breath Diverticulitis of colon ... ED to Hosp-Admission (Discharged) (ADMITTED) [id number] Up MD; Melquiades Kinney ...            Was this an external facility discharge? Yes. Discharge Date: 3/5/25. Facility Name: [facility]    Additional needs identified to be addressed with provider   Salty BAZAN needs an order for the stool specimen.                    Care Summary Note: IZABELLA completed with patient's son. Pt. Home resting now. Salty BAZAN SOC today 3/7/25. Cipro called in and patient's son picked up yesterday for Dx. Of UTI in ED at [facility]. Patient discharged from [facility] w/o antibiotic. Hebert placed in the ED as well. Patient's son called and obtained an appt. For Urology with [facility] for  F/U. Contacted Salty BAZAN today. I spoke with Sabi Clinical Manager to request that a RN visit over the weekend to provide additional education regarding the hebert cath as well as obtain a stool sample.        Advance Care Planning:   Does patient have an Advance Directive: reviewed and current.    Medication Reconciliation:  Medication reconciliation was not performed during this call, Walker reports Salty BAZAN RN reviewed all medications with .     Remote Patient Monitoring:  Offered patient enrollment in the Remote Patient Monitoring (RPM) program for in-home

## 2025-03-10 ENCOUNTER — TELEPHONE (OUTPATIENT)
Dept: INTERNAL MEDICINE CLINIC | Facility: CLINIC | Age: 89
End: 2025-03-10

## 2025-03-10 ENCOUNTER — CARE COORDINATION (OUTPATIENT)
Dept: CARE COORDINATION | Facility: CLINIC | Age: 89
End: 2025-03-10

## 2025-03-10 RX ORDER — BISACODYL 10 MG
10 SUPPOSITORY, RECTAL RECTAL DAILY
Qty: 30 SUPPOSITORY | Refills: 0 | Status: SHIPPED | OUTPATIENT
Start: 2025-03-10 | End: 2025-04-09

## 2025-03-10 NOTE — CARE COORDINATION
Care Transitions Note    Follow Up Call     Patient Current Location:  Home: [address] [address]    Post Acute Care Manager contacted the family, Son, Walker Castillo,  by telephone. Verified name and  as identifiers.    Additional needs identified to be addressed with provider   No needs identified                   Care Summary Note: Contacted Dr. Corea's office [facility] Today to obtain a later in the day appt. For patient as requested, May 15th at 1030. Provided this information and [facility]. Contact # to patient's son. I also contacted [facility] as requested by pt.'s son. I spoke with Jimena at [facility]. I provided contact information for Walker and discussed the families desire to speak with [facility] regarding home hospice for pt. And her . June Natarajan, Clinical Liaison, with [facility] will be contacting the family.I provided this information to Walker. He also stated the HH RN was coming to see his mother again today as she was not feeling well and now is constipated x 3 days. We discussed taking his mother to the ED pending the HH RN visit. He is in agreement with this plan.         Advance Care Planning:   Does patient have an Advance Directive: reviewed and current.    Medication Review:  No changes since last call.     Remote Patient Monitoring:  Offered patient enrollment in the Remote Patient Monitoring (RPM) program for in-home monitoring: n/a        Follow Up Appointment:     Future Appointments         Provider Specialty Dept Phone    3/27/2025 2:40 PM Belkys Cagle APRN - NP Internal Medicine [phone]    4/10/2025 4:15 PM Robert Bragg MD Cardiology [phone]    5/15/2025 10:30 AM Diane Corea MD Gastroenterology [phone]    2025 2:00 PM Belkys Cagle APRN - NP Internal Medicine [phone]            Post Acute Care Manager provided contact information.  Plan for follow-up on next business day.  based on severity of symptoms and

## 2025-03-10 NOTE — TELEPHONE ENCOUNTER
Sent in suppositories for constipation.  Reviewed imaging and has some fluid in her abdomen.  Needs to be seen sooner than 3/27/25.  Please have Belkys determine when she can work her in. Thanks.  [facility]

## 2025-03-10 NOTE — TELEPHONE ENCOUNTER
Name of the Nurse Calling and Facility: Lilo with [facility]    Best Contact Number to Reach the Nurse: [phone]    Reason For Call:  1.  Pt went to the hospital then rehab then home. Upon arriving home she began having trouble urinating so she was taken to the ER. She is now wearing a catheter bag and she is on lasix. She has had sediment in her bag. Today, her urine is still dark but no sediment. Also, she has not had a BM in four days. Is there something you would recommend for constipation?    2.  Pt's abdomen is distended, not hard, but bloated. She is having no pain. She does have heart failure. There are no fluid restrictions noted on any of the pt's paperwork. Is there anything you recommend for the swollen abdomen?    3.  Pt's son said the pt was slumped over in her chair today and they could not wake her. States she did this in the hospital also. States hospital personnel speculated that she may have had a stroke but they just don't know.

## 2025-03-11 ENCOUNTER — CARE COORDINATION (OUTPATIENT)
Dept: CARE COORDINATION | Facility: CLINIC | Age: 89
End: 2025-03-11

## 2025-03-11 NOTE — CARE COORDINATION
Care Transitions Note    Follow Up Call     Patient Current Location:  Home: [address] [address]    Post Acute Care Manager contacted the family, Son, Walker Castillo,  by telephone. Verified name and  as identifiers.    Additional needs identified to be addressed with provider   Family is working with [facility] to get their mother under the care of [facility].                 Method of communication with provider: chart routing.    Care Summary Note: IZABELLA call made to check on patient's status with [facility] RN visit yesterday as well as follow-up from my call to [facility] services yesterday regarding home hospice services for Mrs. Castillo. Patient's son, Walker reports that the HH RN arrived yesterday and assessed his mom. He reports that his mother is just overall not feeling well. He states she just is tired of going through all of this and feels she would just like to be comfortable at home. He states that he and his sister met with the clinical liaison from [facility] this morning and they feel confident that their mother will be accepted into their home hospice program. They should have an answer by tomorrow at the latest.             Remote Patient Monitoring:  Offered patient enrollment in the Remote Patient Monitoring (RPM) program for in-home monitoring: n/a        Follow Up Appointment:     Future Appointments         Provider Specialty Dept Phone    3/27/2025 2:40 PM Belkys Cagle APRN - NP Internal Medicine [phone]    4/10/2025 4:15 PM Robert Bragg MD Cardiology [phone]    5/15/2025 10:30 AM Diane Corea MD Gastroenterology [phone]    2025 2:00 PM Belkys Cagle APRN - NP Internal Medicine [phone]            Post Acute Care Manager provided contact information.  Plan for follow-up on next business day.  based on severity of symptoms and risk factors.  Plan for next call:  Follow-up to make sure the patient is accepted into

## 2025-03-11 NOTE — TELEPHONE ENCOUNTER
Notified  nurse Lilo of suppositories being sent in and that we can work the patient in sooner but that the son mentioned wanting to start Hospice. She states there was no mention of that when she spoke with them yesterday. She will speak with the son and ask them to call back if they want to scheduled appt sooner.

## 2025-03-12 ENCOUNTER — CARE COORDINATION (OUTPATIENT)
Dept: CARE COORDINATION | Facility: CLINIC | Age: 89
End: 2025-03-12

## 2025-03-12 NOTE — CARE COORDINATION
Care Transitions Note    Follow Up Call     Attempted to reach neda capps Chuck Bridges,  for transitions of care follow up.  Unable to reach neda capps Chuck .      Outreach Attempts:   HIPAA compliant voicemail left for family, son.         Follow Up Appointment:   Future Appointments         Provider Specialty Dept Phone    3/27/2025 2:40 PM Belkys Cagle APRN - NP Internal Medicine [phone]    4/10/2025 4:15 PM Robert Bragg MD Cardiology [phone]    5/15/2025 10:30 AM Diane Corea MD Gastroenterology [phone]    5/19/2025 2:00 PM Belkys Cagle APRN - NP Internal Medicine [phone]            Plan for follow-up on next business day.  based on severity of symptoms and risk factors. Plan for next call: referrals-Status of [facility] Referral    Katheryn Cruz RN

## 2025-03-14 ENCOUNTER — CARE COORDINATION (OUTPATIENT)
Dept: CARE COORDINATION | Facility: CLINIC | Age: 89
End: 2025-03-14

## 2025-03-14 NOTE — CARE COORDINATION
Care Transitions Note    Final Call     Patient Current Location:  Home: [address] [address]    Post Acute Care Manager contacted the family, Son, Walker Castillo,  by telephone. Verified name and  as identifiers.    Patient closed (IZABELLA) from the Care Transitions program on 3/14/25.  Patient/family  [facility] is now caring for patient .      Advance Care Planning:   Does patient have an Advance Directive: reviewed and current.    Handoff:   Patient was not referred to the ACM team due to  Patient under the care of [facility].      Care Summary Note: Final call completed with patient's son, Walker Castillo, primary decision maker for his parents. Walker reports that Génesis and Diane with [facility] have been to visit his mother and provide hospice care to her in her home. Walker reports she is not communicating and primarily sleeping peacefully. Minimal urine output in Carpio bag per check and breathing is more shallow. He expressed that he was grateful for connecting them with home hospice services. He reports his siblings are also at peace with this process. I provided Walker Castillo with my contact information. He was very appreciative of the follow-up and care of his mother since her discharge from [facility].       Upcoming Appointments:    Future Appointments         Provider Specialty Dept Phone    3/27/2025 2:40 PM Belkys Cagle APRN - NP Internal Medicine [phone]    4/10/2025 4:15 PM Robert Bragg MD Cardiology [phone]    5/15/2025 10:30 AM Diane Corea MD Gastroenterology [phone]    2025 2:00 PM Belkys Cagle APRN - LIA Internal Medicine [phone]            Patient has agreed to contact primary care provider and/or specialist for any further questions, concerns, or needs.    Katheryn Cruz RN

## (undated) DEVICE — HAND PACK: Brand: MEDLINE INDUSTRIES, INC.

## (undated) DEVICE — SOLUTION IRRIG 1000ML 09% SOD CHL USP PIC PLAS CONTAINER

## (undated) DEVICE — KENDALL RADIOLUCENT FOAM MONITORING ELECTRODE RECTANGULAR SHAPE: Brand: KENDALL

## (undated) DEVICE — STRIP,CLOSURE,WOUND,MEDI-STRIP,1/2X4: Brand: MEDLINE

## (undated) DEVICE — DISPOSABLE BIPOLAR CODE, 12' (3.66 M): Brand: CONMED

## (undated) DEVICE — PADDING CAST W3INXL4YD COT BLEND MIC PLEAT UNDERCAST SPEC

## (undated) DEVICE — GLOVE SURG SZ 65 THK91MIL LTX FREE SYN POLYISOPRENE

## (undated) DEVICE — LUBE JELLY FOIL PACK 1.4 OZ: Brand: MEDLINE INDUSTRIES, INC.

## (undated) DEVICE — SYRINGE MED 10ML LUERLOCK TIP W/O SFTY DISP

## (undated) DEVICE — DRAPE,U/SHT,SPLIT,FILM,60X84,STERILE: Brand: MEDLINE

## (undated) DEVICE — NDL PRT INJ NSAF BLNT 18GX1.5 --

## (undated) DEVICE — SINGLE PORT MANIFOLD: Brand: NEPTUNE 2

## (undated) DEVICE — RUBBERBAND FASTENING W0.25XL3.5IN 5 PER PK

## (undated) DEVICE — SUTURE ETHLN SZ 4-0 L18IN NONABSORBABLE BLK L19MM PS-2 3/8 1667H

## (undated) DEVICE — 1010 S-DRAPE TOWEL DRAPE 10/BX: Brand: STERI-DRAPE™

## (undated) DEVICE — ZIMMER® STERILE DISPOSABLE TOURNIQUET CUFF WITH PLC, DUAL PORT, SINGLE BLADDER, 18 IN. (46 CM)

## (undated) DEVICE — SYRINGE IRRIG 60ML SFT PLIABLE BLB EZ TO GRP 1 HND USE W/

## (undated) DEVICE — ENDOSCOPIC KIT 1.1+ OP4 CA DE 2 GWN AAMI LEVEL 3

## (undated) DEVICE — INTENDED FOR TISSUE SEPARATION, AND OTHER PROCEDURES THAT REQUIRE A SHARP SURGICAL BLADE TO PUNCTURE OR CUT.: Brand: BARD-PARKER ® STAINLESS STEEL BLADES

## (undated) DEVICE — BLOCK BITE AD 60FR W/ VELC STRP ADDRESSES MOST PT AND

## (undated) DEVICE — GAUZE,SPONGE,4"X4",12PLY,WOVEN,NS,LF: Brand: MEDLINE

## (undated) DEVICE — SYR 5ML 1/5 GRAD LL NSAF LF --

## (undated) DEVICE — BNDG,ELSTC,MATRIX,STRL,2"X5YD,LF,HOOK&LP: Brand: MEDLINE

## (undated) DEVICE — 1200 GUARD II KIT W/5MM TUBE W/O VAC TUBE: Brand: GUARDIAN

## (undated) DEVICE — CONNECTOR TBNG OD5-7MM O2 END DISP

## (undated) DEVICE — SYRINGE MEDICAL 3ML CLEAR PLASTIC STANDARD NON CONTROL LUERLOCK TIP DISPOSABLE

## (undated) DEVICE — SLING ARM AD ULT

## (undated) DEVICE — GLOVE SURG SZ 65 L12IN FNGR THK79MIL GRN LTX FREE

## (undated) DEVICE — FORCEPS BX L240CM JAW DIA2.8MM L CAP W/ NDL MIC MESH TOOTH

## (undated) DEVICE — GARMENT,MEDLINE,DVT,INT,CALF,MED, GEN2: Brand: MEDLINE

## (undated) DEVICE — DRESSING,GAUZE,XEROFORM,CURAD,1"X8",ST: Brand: CURAD

## (undated) DEVICE — MOUTHPIECE ENDOSCP L CTRL OPN AND SIDE PORTS DISP

## (undated) DEVICE — CANNULA NSL ORAL AD FOR CAPNOFLEX CO2 O2 AIRLFE

## (undated) DEVICE — YANKAUER,BULB TIP,W/O VENT,RIGID,STERILE: Brand: MEDLINE

## (undated) DEVICE — AIRLIFE™ OXYGEN TUBING 7 FEET (2.1 M) CRUSH RESISTANT OXYGEN TUBING, VINYL TIPPED: Brand: AIRLIFE™

## (undated) DEVICE — PADDING CAST W4INXL4YD ST COT COHESIVE HND TEARABLE SPEC

## (undated) DEVICE — ELECTRODE PT RET AD L9FT HI MOIST COND ADH HYDRGEL CORDED

## (undated) DEVICE — SUTURE VCRL SZ 3-0 L27IN ABSRB UD L26MM SH 1/2 CIR J416H

## (undated) DEVICE — SYR 3ML LL TIP 1/10ML GRAD --

## (undated) DEVICE — Device: Brand: BALLOON3

## (undated) DEVICE — INTENDED TO BE USED TO OCCLUDE, RETRACT AND IDENTIFY ARTERIES, VEINS, TENDONS AND NERVES IN SURGICAL PROCEDURES: Brand: STERION®  VESSEL LOOP

## (undated) DEVICE — BNDG,ELSTC,MATRIX,STRL,6"X5YD,LF,HOOK&LP: Brand: MEDLINE

## (undated) DEVICE — MASTISOL ADHESIVE LIQ 2/3ML

## (undated) DEVICE — NEEDLE SYRINGE 18GA L1.5IN RED PLAS HUB S STL BLNT FILL W/O

## (undated) DEVICE — SUTURE STRATAFIX SPRL MCRYL + SZ 4-0 L12IN ABSRB UD PS-2 SXMP1B117

## (undated) DEVICE — CONTAINER PREFIL FRMLN 40ML --